# Patient Record
Sex: MALE | Race: BLACK OR AFRICAN AMERICAN | NOT HISPANIC OR LATINO | Employment: UNEMPLOYED | URBAN - NONMETROPOLITAN AREA
[De-identification: names, ages, dates, MRNs, and addresses within clinical notes are randomized per-mention and may not be internally consistent; named-entity substitution may affect disease eponyms.]

---

## 2018-03-29 ENCOUNTER — HISTORICAL (OUTPATIENT)
Dept: ADMINISTRATIVE | Facility: HOSPITAL | Age: 23
End: 2018-03-29

## 2018-05-07 ENCOUNTER — HISTORICAL (OUTPATIENT)
Dept: ADMINISTRATIVE | Facility: HOSPITAL | Age: 23
End: 2018-05-07

## 2019-01-14 ENCOUNTER — HISTORICAL (OUTPATIENT)
Dept: ADMINISTRATIVE | Facility: HOSPITAL | Age: 24
End: 2019-01-14

## 2019-12-03 ENCOUNTER — HISTORICAL (OUTPATIENT)
Dept: ADMINISTRATIVE | Facility: HOSPITAL | Age: 24
End: 2019-12-03

## 2020-08-15 ENCOUNTER — HISTORICAL (OUTPATIENT)
Dept: ADMINISTRATIVE | Facility: HOSPITAL | Age: 25
End: 2020-08-15

## 2021-12-26 ENCOUNTER — HISTORICAL (OUTPATIENT)
Dept: ADMINISTRATIVE | Facility: HOSPITAL | Age: 26
End: 2021-12-26

## 2022-03-04 ENCOUNTER — HISTORICAL (OUTPATIENT)
Dept: ADMINISTRATIVE | Facility: HOSPITAL | Age: 27
End: 2022-03-04

## 2023-01-29 ENCOUNTER — HOSPITAL ENCOUNTER (EMERGENCY)
Facility: HOSPITAL | Age: 28
Discharge: HOME | End: 2023-01-29
Payer: MEDICARE

## 2023-01-29 VITALS
DIASTOLIC BLOOD PRESSURE: 92 MMHG | WEIGHT: 302 LBS | TEMPERATURE: 97 F | HEIGHT: 73 IN | HEART RATE: 71 BPM | RESPIRATION RATE: 18 BRPM | OXYGEN SATURATION: 99 % | BODY MASS INDEX: 40.02 KG/M2 | SYSTOLIC BLOOD PRESSURE: 127 MMHG

## 2023-01-29 DIAGNOSIS — L60.0 INGROWN RIGHT GREATER TOENAIL: Primary | ICD-10-CM

## 2023-01-29 PROCEDURE — 99284 EMERGENCY DEPT VISIT MOD MDM: CPT

## 2023-01-29 RX ORDER — MUPIROCIN 20 MG/G
OINTMENT TOPICAL 3 TIMES DAILY
Qty: 25 G | Refills: 0 | Status: SHIPPED | OUTPATIENT
Start: 2023-01-29 | End: 2023-02-03

## 2023-01-29 RX ORDER — ACETAMINOPHEN AND CODEINE PHOSPHATE 300; 30 MG/1; MG/1
1 TABLET ORAL EVERY 8 HOURS PRN
Qty: 6 TABLET | Refills: 0 | Status: SHIPPED | OUTPATIENT
Start: 2023-01-29 | End: 2024-01-24

## 2023-01-29 RX ORDER — ACETAMINOPHEN AND CODEINE PHOSPHATE 300; 30 MG/1; MG/1
1 TABLET ORAL EVERY 8 HOURS PRN
Qty: 6 TABLET | Refills: 0 | Status: SHIPPED | OUTPATIENT
Start: 2023-01-29 | End: 2023-01-29 | Stop reason: SDUPTHER

## 2023-01-29 RX ORDER — MUPIROCIN 20 MG/G
OINTMENT TOPICAL 3 TIMES DAILY
Qty: 25 G | Refills: 0 | Status: SHIPPED | OUTPATIENT
Start: 2023-01-29 | End: 2023-01-29 | Stop reason: SDUPTHER

## 2023-01-29 NOTE — ED NOTES
Pt presents with pain to right great toe d/t ingrown toenail. States mother has been trying to remove ingrown toe nail but has not been successful. Redness and swelling noted to toe.

## 2023-01-29 NOTE — ED PROVIDER NOTES
Encounter Date: 1/29/2023       History     Chief Complaint   Patient presents with    Toe Pain     AMB TO ED WITH C/O PAIN TO R GREAT TOE DUE TO INGROWN NAIL     Rt great toe pain and redness x 3 days, the pt states he tried to remove an ingrown toenail2 days ago    Review of patient's allergies indicates:   Allergen Reactions    Codeine      Past Medical History:   Diagnosis Date    Diabetes mellitus      Past Surgical History:   Procedure Laterality Date    WRIST SURGERY       History reviewed. No pertinent family history.  Social History     Tobacco Use    Smoking status: Never    Smokeless tobacco: Never   Substance Use Topics    Alcohol use: Never    Drug use: Never     Review of Systems   Skin:         Rt great toenail redness and mild swelling   All other systems reviewed and are negative.    Physical Exam     Initial Vitals [01/29/23 1443]   BP Pulse Resp Temp SpO2   111/65 90 18 97.2 °F (36.2 °C) 98 %      MAP       --         Physical Exam    Nursing note and vitals reviewed.  Constitutional: He appears well-developed and well-nourished.   HENT:   Head: Normocephalic and atraumatic.   Mouth/Throat: Mucous membranes are normal.   Eyes: EOM are normal. Pupils are equal, round, and reactive to light.   Neck: Neck supple.   Normal range of motion.  Cardiovascular:  Normal rate, regular rhythm, normal heart sounds and intact distal pulses.           Pulmonary/Chest: Breath sounds normal.   Abdominal: Abdomen is soft. Bowel sounds are normal.   Musculoskeletal:         General: Normal range of motion.      Cervical back: Normal range of motion and neck supple.     Neurological: He is alert and oriented to person, place, and time. He has normal strength.   Skin: Skin is warm and dry. Capillary refill takes less than 2 seconds.   Rt great toenail lateral side erythema ,and mild swelling, no drainage   Psychiatric: He has a normal mood and affect. His behavior is normal. Judgment and thought content normal.        ED Course   Procedures  Labs Reviewed - No data to display       Imaging Results    None          Medications - No data to display                           Clinical Impression:   Final diagnoses:  [L60.0] Ingrown right greater toenail (Primary)        ED Disposition Condition    Discharge Stable          ED Prescriptions       Medication Sig Dispense Start Date End Date Auth. Provider    mupirocin (BACTROBAN) 2 % ointment  (Status: Discontinued) Apply topically 3 (three) times daily. for 5 days 25 g 1/29/2023 1/29/2023 STACEY Barrios    acetaminophen-codeine 300-30mg (TYLENOL #3) 300-30 mg Tab  (Status: Discontinued) Take 1 tablet by mouth every 8 (eight) hours as needed. 6 tablet 1/29/2023 1/29/2023 STACEY Barrios    acetaminophen-codeine 300-30mg (TYLENOL #3) 300-30 mg Tab Take 1 tablet by mouth every 8 (eight) hours as needed. 6 tablet 1/29/2023 -- STACEY Barrios    mupirocin (BACTROBAN) 2 % ointment Apply topically 3 (three) times daily. for 5 days 25 g 1/29/2023 2/3/2023 STACEY Barrios          Follow-up Information       Follow up With Specialties Details Why Contact Info    Vannesa Cooley NP Family Medicine Schedule an appointment as soon as possible for a visit   [address] [place] [id number] [phone]               STACEY Barrios  01/29/23 4313

## 2023-01-31 ENCOUNTER — NURSE TRIAGE (OUTPATIENT)
Dept: ADMINISTRATIVE | Facility: CLINIC | Age: 28
End: 2023-01-31
Payer: MEDICARE

## 2023-01-31 NOTE — TELEPHONE ENCOUNTER
[facility] incoming call - Pt c/o ingrown toenail,  no fever, not infected. Info only protocol followed and pt advised to soak in epsom salt solution daily then put Bactroban ointment as rx on and wrap in clean gauze to keep wound clean  and to control his pain with tylenol 3 rx and if he has any problems to call [facility]oc at 1 [phone]. Pt has no further questions at this time after education given on wound care and rx. Encounter routed to PCP/staff.   Reason for Disposition   Information only question and nurse able to answer    Protocols used: Information Only Call - No Triage-A-OH

## 2023-06-06 ENCOUNTER — LAB VISIT (OUTPATIENT)
Dept: LAB | Facility: HOSPITAL | Age: 28
End: 2023-06-06
Attending: NURSE PRACTITIONER
Payer: MEDICAID

## 2023-06-06 DIAGNOSIS — R79.89 OTHER SPECIFIED ABNORMAL FINDINGS OF BLOOD CHEMISTRY: ICD-10-CM

## 2023-06-06 DIAGNOSIS — R73.03 PREDIABETES: ICD-10-CM

## 2023-06-06 DIAGNOSIS — Z00.00 ROUTINE GENERAL MEDICAL EXAMINATION AT A HEALTH CARE FACILITY: Primary | ICD-10-CM

## 2023-06-06 LAB
ALBUMIN SERPL-MCNC: 4.7 G/DL (ref 3.4–5)
ALBUMIN/GLOB SERPL: 1.7 RATIO
ALP SERPL-CCNC: 65 UNIT/L (ref 50–144)
ALT SERPL-CCNC: 39 UNIT/L (ref 1–45)
ANION GAP SERPL CALC-SCNC: 9 MEQ/L (ref 2–13)
AST SERPL-CCNC: 36 UNIT/L (ref 17–59)
BASOPHILS # BLD AUTO: 0.03 X10(3)/MCL (ref 0.01–0.08)
BASOPHILS NFR BLD AUTO: 0.3 % (ref 0.1–1.2)
BILIRUBIN DIRECT+TOT PNL SERPL-MCNC: 0.9 MG/DL (ref 0–1)
BUN SERPL-MCNC: 10 MG/DL (ref 7–20)
CALCIUM SERPL-MCNC: 9.5 MG/DL (ref 8.4–10.2)
CHLORIDE SERPL-SCNC: 100 MMOL/L (ref 98–110)
CHOLEST SERPL-MCNC: 83 MG/DL (ref 0–200)
CO2 SERPL-SCNC: 28 MMOL/L (ref 21–32)
CREAT SERPL-MCNC: 0.54 MG/DL (ref 0.66–1.25)
CREAT UR-MCNC: 23.3 MG/DL (ref 63–166)
CREAT/UREA NIT SERPL: 19 (ref 12–20)
EOSINOPHIL # BLD AUTO: 0.05 X10(3)/MCL (ref 0.04–0.54)
EOSINOPHIL NFR BLD AUTO: 0.4 % (ref 0.7–7)
ERYTHROCYTE [DISTWIDTH] IN BLOOD BY AUTOMATED COUNT: 13.3 %
EST. AVERAGE GLUCOSE BLD GHB EST-MCNC: 231.7 MG/DL (ref 70–115)
GFR SERPLBLD CREATININE-BSD FMLA CKD-EPI: >90 MLS/MIN/1.73/M2
GLOBULIN SER-MCNC: 2.8 GM/DL (ref 2–3.9)
GLUCOSE SERPL-MCNC: 54 MG/DL (ref 70–115)
HBA1C MFR BLD: 9.7 % (ref 4–6)
HCT VFR BLD AUTO: 42.5 % (ref 36–52)
HDLC SERPL-MCNC: 27 MG/DL (ref 40–60)
HGB BLD-MCNC: 14.5 G/DL (ref 13–18)
IMM GRANULOCYTES # BLD AUTO: 0.03 X10(3)/MCL (ref 0–0.03)
IMM GRANULOCYTES NFR BLD AUTO: 0.3 % (ref 0–0.5)
LDLC SERPL DIRECT ASSAY-SCNC: 35 MG/DL (ref 30–100)
LYMPHOCYTES # BLD AUTO: 3.31 X10(3)/MCL (ref 1.32–3.57)
LYMPHOCYTES NFR BLD AUTO: 28 % (ref 20–55)
MCH RBC QN AUTO: 29.4 PG (ref 27–34)
MCHC RBC AUTO-ENTMCNC: 34.1 G/DL (ref 31–37)
MCV RBC AUTO: 86 FL (ref 79–99)
MICROALBUMIN UR-MCNC: <5 UG/ML
MICROALBUMIN/CREAT RATIO PNL UR: <21.5 MG/GM CR (ref 0–30)
MONOCYTES # BLD AUTO: 0.92 X10(3)/MCL (ref 0.3–0.82)
MONOCYTES NFR BLD AUTO: 7.8 % (ref 4.7–12.5)
NEUTROPHILS # BLD AUTO: 7.5 X10(3)/MCL (ref 1.78–5.38)
NEUTROPHILS NFR BLD AUTO: 63.2 % (ref 37–73)
NRBC BLD AUTO-RTO: 0 %
PLATELET # BLD AUTO: 301 X10(3)/MCL (ref 140–371)
PMV BLD AUTO: 8.4 FL (ref 9.4–12.4)
POTASSIUM SERPL-SCNC: 3.7 MMOL/L (ref 3.5–5.1)
PROT SERPL-MCNC: 7.5 GM/DL (ref 6.3–8.2)
RBC # BLD AUTO: 4.94 X10(6)/MCL (ref 4–6)
SODIUM SERPL-SCNC: 137 MMOL/L (ref 135–145)
TRIGL SERPL-MCNC: 171 MG/DL (ref 30–200)
TSH SERPL-ACNC: 0.77 UIU/ML (ref 0.36–3.74)
WBC # SPEC AUTO: 11.84 X10(3)/MCL (ref 4–11.5)

## 2023-06-06 PROCEDURE — 83036 HEMOGLOBIN GLYCOSYLATED A1C: CPT

## 2023-06-06 PROCEDURE — 85025 COMPLETE CBC W/AUTO DIFF WBC: CPT

## 2023-06-06 PROCEDURE — 82043 UR ALBUMIN QUANTITATIVE: CPT

## 2023-06-06 PROCEDURE — 84443 ASSAY THYROID STIM HORMONE: CPT

## 2023-06-06 PROCEDURE — 36415 COLL VENOUS BLD VENIPUNCTURE: CPT

## 2023-06-06 PROCEDURE — 80053 COMPREHEN METABOLIC PANEL: CPT

## 2023-06-06 PROCEDURE — 80061 LIPID PANEL: CPT

## 2023-10-21 ENCOUNTER — LAB VISIT (OUTPATIENT)
Dept: LAB | Facility: HOSPITAL | Age: 28
End: 2023-10-21
Attending: NURSE PRACTITIONER
Payer: MEDICARE

## 2023-10-21 DIAGNOSIS — Z00.00 UNSPECIFIED GENERAL MEDICAL EXAMINATION: ICD-10-CM

## 2023-10-21 DIAGNOSIS — I10 ESSENTIAL HYPERTENSION, MALIGNANT: ICD-10-CM

## 2023-10-21 DIAGNOSIS — Z00.00 ROUTINE GENERAL MEDICAL EXAMINATION AT A HEALTH CARE FACILITY: Primary | ICD-10-CM

## 2023-10-21 DIAGNOSIS — E83.10 DISORDER OF IRON METABOLISM, UNSPECIFIED: ICD-10-CM

## 2023-10-21 LAB
ALBUMIN SERPL-MCNC: 4.6 G/DL (ref 3.4–5)
ALBUMIN/GLOB SERPL: 1.5 RATIO
ALP SERPL-CCNC: 59 UNIT/L (ref 50–144)
ALT SERPL-CCNC: 36 UNIT/L (ref 1–45)
ANION GAP SERPL CALC-SCNC: 9 MEQ/L (ref 2–13)
AST SERPL-CCNC: 36 UNIT/L (ref 17–59)
BILIRUB SERPL-MCNC: 0.8 MG/DL (ref 0–1)
BUN SERPL-MCNC: 12 MG/DL (ref 7–20)
CALCIUM SERPL-MCNC: 9.9 MG/DL (ref 8.4–10.2)
CHLORIDE SERPL-SCNC: 99 MMOL/L (ref 98–110)
CHOLEST SERPL-MCNC: 81 MG/DL (ref 0–200)
CO2 SERPL-SCNC: 28 MMOL/L (ref 21–32)
CREAT SERPL-MCNC: 0.61 MG/DL (ref 0.66–1.25)
CREAT/UREA NIT SERPL: 20 (ref 12–20)
EST. AVERAGE GLUCOSE BLD GHB EST-MCNC: 145.6 MG/DL (ref 70–115)
GFR SERPLBLD CREATININE-BSD FMLA CKD-EPI: >90 MLS/MIN/1.73/M2
GLOBULIN SER-MCNC: 3.1 GM/DL (ref 2–3.9)
GLUCOSE SERPL-MCNC: 43 MG/DL (ref 70–115)
HBA1C MFR BLD: 6.7 % (ref 4–6)
HDLC SERPL-MCNC: 32 MG/DL (ref 40–60)
LDLC SERPL DIRECT ASSAY-SCNC: 32.5 MG/DL (ref 30–100)
MICROALBUMIN UR-MCNC: 20 UG/ML
POTASSIUM SERPL-SCNC: 4.3 MMOL/L (ref 3.5–5.1)
PROT SERPL-MCNC: 7.7 GM/DL (ref 6.3–8.2)
SODIUM SERPL-SCNC: 136 MMOL/L (ref 135–145)
TRIGL SERPL-MCNC: 64 MG/DL (ref 30–200)

## 2023-10-21 PROCEDURE — 83036 HEMOGLOBIN GLYCOSYLATED A1C: CPT

## 2023-10-21 PROCEDURE — 36415 COLL VENOUS BLD VENIPUNCTURE: CPT

## 2023-10-21 PROCEDURE — 80061 LIPID PANEL: CPT

## 2023-10-21 PROCEDURE — 80053 COMPREHEN METABOLIC PANEL: CPT

## 2023-10-21 PROCEDURE — 82043 UR ALBUMIN QUANTITATIVE: CPT

## 2023-12-19 ENCOUNTER — HOSPITAL ENCOUNTER (EMERGENCY)
Facility: HOSPITAL | Age: 28
Discharge: HOME | End: 2023-12-19
Payer: MEDICARE

## 2023-12-19 VITALS
SYSTOLIC BLOOD PRESSURE: 118 MMHG | OXYGEN SATURATION: 100 % | RESPIRATION RATE: 20 BRPM | BODY MASS INDEX: 38.43 KG/M2 | TEMPERATURE: 98 F | WEIGHT: 290 LBS | HEART RATE: 112 BPM | DIASTOLIC BLOOD PRESSURE: 88 MMHG | HEIGHT: 73 IN

## 2023-12-19 DIAGNOSIS — B34.9 VIRAL SYNDROME: Primary | ICD-10-CM

## 2023-12-19 LAB
INFLUENZA A (OHS): NEGATIVE
INFLUENZA B (OHS): NEGATIVE
RAPID GROUP A STREP (OHS): NEGATIVE

## 2023-12-19 PROCEDURE — 87651 STREP A DNA AMP PROBE: CPT | Performed by: NURSE PRACTITIONER

## 2023-12-19 PROCEDURE — 87400 INFLUENZA A/B EACH AG IA: CPT | Performed by: NURSE PRACTITIONER

## 2023-12-19 PROCEDURE — 99283 EMERGENCY DEPT VISIT LOW MDM: CPT

## 2023-12-20 NOTE — ED PROVIDER NOTES
Encounter Date: 12/19/2023       History     Chief Complaint   Patient presents with    Influenza     Onset yesterday pt cough cold congestion nasal congestion headache throat pain. Pt was exposed to flu       Onset yesterday pt cough cold congestion nasal congestion headache throat pain. Pt was exposed to flu          Review of patient's allergies indicates:   Allergen Reactions    Codeine      Past Medical History:   Diagnosis Date    Diabetes mellitus      Past Surgical History:   Procedure Laterality Date    WRIST SURGERY       History reviewed. No pertinent family history.  Social History     Tobacco Use    Smoking status: Never    Smokeless tobacco: Never   Substance Use Topics    Alcohol use: Never    Drug use: Never     Review of Systems   HENT:  Positive for congestion and sore throat.    Respiratory:  Positive for cough.    Neurological:  Positive for headaches.   All other systems reviewed and are negative.      Physical Exam     Initial Vitals [12/19/23 2013]   BP Pulse Resp Temp SpO2   118/88 (!) 112 20 98.2 °F (36.8 °C) 100 %      MAP       --         Physical Exam    Nursing note and vitals reviewed.  Constitutional: He appears well-developed and well-nourished.   HENT:   Head: Normocephalic and atraumatic.   Mouth/Throat: Mucous membranes are normal.   Eyes: Pupils are equal, round, and reactive to light.   Neck:   Normal range of motion.  Cardiovascular:  Normal rate, regular rhythm and normal heart sounds.           Pulmonary/Chest: Breath sounds normal.   Musculoskeletal:         General: Normal range of motion.      Cervical back: Normal range of motion.     Neurological: He is alert and oriented to person, place, and time.   Skin: Skin is warm and dry. Capillary refill takes less than 2 seconds.   Psychiatric: He has a normal mood and affect. His behavior is normal. Judgment and thought content normal.         ED Course   Procedures  Labs Reviewed   RAPID INFLUENZA A/B - Normal   THROAT SCREEN,  RAPID STREP - Normal          Imaging Results    None          Medications - No data to display  Medical Decision Making  Problems Addressed:  Viral syndrome: self-limited or minor problem                                      Clinical Impression:  Final diagnoses:  [B34.9] Viral syndrome (Primary)          ED Disposition Condition    Discharge Stable          ED Prescriptions    None       Follow-up Information       Follow up With Specialties Details Why Contact Info    Vannesa Cooley NP Family Medicine Call  As needed [address] [facility] [id number] [phone]               Christianne Gaspar, FNP  12/19/23 6138

## 2024-01-18 ENCOUNTER — HOSPITAL ENCOUNTER (EMERGENCY)
Facility: HOSPITAL | Age: 29
Discharge: HOME | End: 2024-01-18
Payer: MEDICARE

## 2024-01-18 VITALS
SYSTOLIC BLOOD PRESSURE: 140 MMHG | BODY MASS INDEX: 39.33 KG/M2 | HEIGHT: 72 IN | TEMPERATURE: 98 F | RESPIRATION RATE: 20 BRPM | DIASTOLIC BLOOD PRESSURE: 79 MMHG | HEART RATE: 105 BPM | OXYGEN SATURATION: 100 %

## 2024-01-18 DIAGNOSIS — U07.1 SARS-COV-2 POSITIVE: Primary | ICD-10-CM

## 2024-01-18 LAB
INFLUENZA A (OHS): NEGATIVE
INFLUENZA B (OHS): NEGATIVE
SARS-COV-2 RDRP RESP QL NAA+PROBE: POSITIVE

## 2024-01-18 PROCEDURE — 99283 EMERGENCY DEPT VISIT LOW MDM: CPT

## 2024-01-18 PROCEDURE — 87635 SARS-COV-2 COVID-19 AMP PRB: CPT | Performed by: NURSE PRACTITIONER

## 2024-01-18 PROCEDURE — 87400 INFLUENZA A/B EACH AG IA: CPT | Performed by: NURSE PRACTITIONER

## 2024-01-18 PROCEDURE — 25000003 PHARM REV CODE 250: Performed by: NURSE PRACTITIONER

## 2024-01-18 RX ORDER — GUAIFENESIN 100 MG/5ML
200 SOLUTION ORAL 3 TIMES DAILY PRN
Qty: 180 ML | Refills: 0 | Status: SHIPPED | OUTPATIENT
Start: 2024-01-18 | End: 2024-01-24

## 2024-01-18 RX ORDER — GUAIFENESIN 100 MG/5ML
200 SOLUTION ORAL EVERY 4 HOURS PRN
Status: DISCONTINUED | OUTPATIENT
Start: 2024-01-18 | End: 2024-01-18 | Stop reason: HOSPADM

## 2024-01-18 RX ORDER — IBUPROFEN 400 MG/1
800 TABLET ORAL
Status: COMPLETED | OUTPATIENT
Start: 2024-01-18 | End: 2024-01-18

## 2024-01-18 RX ADMIN — IBUPROFEN 800 MG: 400 TABLET, FILM COATED ORAL at 08:01

## 2024-01-18 RX ADMIN — GUAIFENESIN 200 MG: 200 SOLUTION ORAL at 08:01

## 2024-01-18 NOTE — Clinical Note
"Michael Pryorhoracio Malone was seen and treated in our emergency department on 1/18/2024.  He may return to work on 01/22/2024.       If you have any questions or concerns, please don't hesitate to call.      Christianne Gaspar, KARLAP"

## 2024-01-19 DIAGNOSIS — U07.1 COVID-19 VIRUS DETECTED: ICD-10-CM

## 2024-01-19 NOTE — ED PROVIDER NOTES
Encounter Date: 1/18/2024       History     Chief Complaint   Patient presents with    Headache     Pt reports onset of headache, generalized body aches, and cough this AM. Pt denies any N/V/D.     Generalized Body Aches    Cough       Pt reports onset of headache, generalized body aches, and cough this AM. Pt denies any N/V/D          Review of patient's allergies indicates:   Allergen Reactions    Codeine      Past Medical History:   Diagnosis Date    Diabetes mellitus      Past Surgical History:   Procedure Laterality Date    WRIST SURGERY       History reviewed. No pertinent family history.  Social History     Tobacco Use    Smoking status: Never    Smokeless tobacco: Never   Substance Use Topics    Alcohol use: Never    Drug use: Never     Review of Systems   Respiratory:  Positive for cough.    Musculoskeletal:  Positive for myalgias.   Neurological:  Positive for headaches.   All other systems reviewed and are negative.      Physical Exam     Initial Vitals [01/18/24 2021]   BP Pulse Resp Temp SpO2   (!) 140/79 105 20 98.1 °F (36.7 °C) 100 %      MAP       --         Physical Exam    Nursing note and vitals reviewed.  Constitutional: He appears well-developed and well-nourished.   HENT:   Head: Normocephalic and atraumatic.   Mouth/Throat: Mucous membranes are normal.   Eyes: Pupils are equal, round, and reactive to light.   Neck: Neck supple.   Normal range of motion.  Cardiovascular:  Normal rate, regular rhythm and normal heart sounds.           Pulmonary/Chest: Breath sounds normal.   Musculoskeletal:         General: Normal range of motion.      Cervical back: Normal range of motion and neck supple.     Neurological: He is alert and oriented to person, place, and time.   Skin: Skin is warm and dry. Capillary refill takes less than 2 seconds.   Psychiatric: He has a normal mood and affect. His behavior is normal. Judgment and thought content normal.         ED Course   Procedures  Labs Reviewed    SARS-COV-2 RNA AMPLIFICATION, QUAL - Abnormal; Notable for the following components:       Result Value    SARS COV-2 MOLECULAR Positive (*)     All other components within normal limits   RAPID INFLUENZA A/B - Normal          Imaging Results    None          Medications   guaiFENesin 100 mg/5 ml syrup 200 mg (200 mg Oral Given 1/18/24 2055)   ibuprofen tablet 800 mg (800 mg Oral Given 1/18/24 2055)     Medical Decision Making  Problems Addressed:  SARS-CoV-2 positive: acute illness or injury    Risk  OTC drugs.  Prescription drug management.                                      Clinical Impression:  Final diagnoses:  [U07.1] SARS-CoV-2 positive (Primary)          ED Disposition Condition    Discharge Stable          ED Prescriptions       Medication Sig Dispense Start Date End Date Auth. Provider    guaiFENesin 100 mg/5 ml (ROBITUSSIN) 100 mg/5 mL syrup Take 10 mLs (200 mg total) by mouth 3 (three) times daily as needed for Cough. 180 mL 1/18/2024 1/28/2024 Christianne Gaspar FNP          Follow-up Information       Follow up With Specialties Details Why Contact Info    Vannesa Cooley NP Family Medicine Call  As needed [address] [facility] [address] [phone]               Christianne Gaspar FNP  01/18/24 2111

## 2024-01-24 ENCOUNTER — OFFICE VISIT (OUTPATIENT)
Dept: FAMILY MEDICINE | Facility: CLINIC | Age: 29
End: 2024-01-24
Payer: MEDICARE

## 2024-01-24 VITALS
HEART RATE: 100 BPM | TEMPERATURE: 99 F | OXYGEN SATURATION: 98 % | HEIGHT: 72 IN | SYSTOLIC BLOOD PRESSURE: 110 MMHG | WEIGHT: 274 LBS | BODY MASS INDEX: 37.11 KG/M2 | DIASTOLIC BLOOD PRESSURE: 78 MMHG

## 2024-01-24 DIAGNOSIS — E78.5 HYPERLIPIDEMIA, UNSPECIFIED HYPERLIPIDEMIA TYPE: ICD-10-CM

## 2024-01-24 DIAGNOSIS — J45.20 MILD INTERMITTENT ASTHMA WITHOUT COMPLICATION: ICD-10-CM

## 2024-01-24 DIAGNOSIS — F84.0 AUTISM: ICD-10-CM

## 2024-01-24 DIAGNOSIS — Z79.4 TYPE 2 DIABETES MELLITUS WITHOUT COMPLICATION, WITH LONG-TERM CURRENT USE OF INSULIN: ICD-10-CM

## 2024-01-24 DIAGNOSIS — F33.0 MILD EPISODE OF RECURRENT MAJOR DEPRESSIVE DISORDER: ICD-10-CM

## 2024-01-24 DIAGNOSIS — Z79.899 OTHER LONG TERM (CURRENT) DRUG THERAPY: ICD-10-CM

## 2024-01-24 DIAGNOSIS — J30.2 SEASONAL ALLERGIES: ICD-10-CM

## 2024-01-24 DIAGNOSIS — Z76.89 ENCOUNTER TO ESTABLISH CARE: Primary | ICD-10-CM

## 2024-01-24 DIAGNOSIS — E11.9 TYPE 2 DIABETES MELLITUS WITHOUT COMPLICATION, WITH LONG-TERM CURRENT USE OF INSULIN: ICD-10-CM

## 2024-01-24 DIAGNOSIS — F41.9 ANXIETY: ICD-10-CM

## 2024-01-24 PROBLEM — F32.0 CURRENT MILD EPISODE OF MAJOR DEPRESSIVE DISORDER: Status: ACTIVE | Noted: 2024-01-24

## 2024-01-24 PROCEDURE — 3008F BODY MASS INDEX DOCD: CPT | Mod: ,,, | Performed by: NURSE PRACTITIONER

## 2024-01-24 PROCEDURE — 3078F DIAST BP <80 MM HG: CPT | Mod: ,,, | Performed by: NURSE PRACTITIONER

## 2024-01-24 PROCEDURE — 4010F ACE/ARB THERAPY RXD/TAKEN: CPT | Mod: ,,, | Performed by: NURSE PRACTITIONER

## 2024-01-24 PROCEDURE — 3074F SYST BP LT 130 MM HG: CPT | Mod: ,,, | Performed by: NURSE PRACTITIONER

## 2024-01-24 PROCEDURE — 1160F RVW MEDS BY RX/DR IN RCRD: CPT | Mod: ,,, | Performed by: NURSE PRACTITIONER

## 2024-01-24 PROCEDURE — 99203 OFFICE O/P NEW LOW 30 MIN: CPT | Mod: ,,, | Performed by: NURSE PRACTITIONER

## 2024-01-24 PROCEDURE — 1159F MED LIST DOCD IN RCRD: CPT | Mod: ,,, | Performed by: NURSE PRACTITIONER

## 2024-01-24 RX ORDER — DULAGLUTIDE 4.5 MG/.5ML
4.5 INJECTION, SOLUTION SUBCUTANEOUS
COMMUNITY
Start: 2023-11-09

## 2024-01-24 RX ORDER — ARIPIPRAZOLE 5 MG/1
5 TABLET ORAL DAILY
COMMUNITY
Start: 2023-12-29

## 2024-01-24 RX ORDER — PIOGLITAZONEHYDROCHLORIDE 30 MG/1
30 TABLET ORAL DAILY
COMMUNITY
Start: 2023-11-09

## 2024-01-24 RX ORDER — TRAZODONE HYDROCHLORIDE 150 MG/1
150-300 TABLET ORAL NIGHTLY PRN
COMMUNITY
Start: 2023-12-29

## 2024-01-24 RX ORDER — MONTELUKAST SODIUM 5 MG/1
5 TABLET, CHEWABLE ORAL DAILY
COMMUNITY
End: 2024-04-29 | Stop reason: SDUPTHER

## 2024-01-24 RX ORDER — SAXAGLIPTIN 5 MG/1
5 TABLET, FILM COATED ORAL DAILY
COMMUNITY
Start: 2023-11-09

## 2024-01-24 RX ORDER — INSULIN GLARGINE 300 U/ML
60 INJECTION, SOLUTION SUBCUTANEOUS DAILY
COMMUNITY
Start: 2023-11-09

## 2024-01-24 RX ORDER — CITALOPRAM 10 MG/1
10 TABLET ORAL DAILY
COMMUNITY
Start: 2023-12-29 | End: 2024-03-12 | Stop reason: SDUPTHER

## 2024-01-24 RX ORDER — LISINOPRIL 20 MG/1
20 TABLET ORAL DAILY
COMMUNITY
Start: 2023-11-09

## 2024-01-24 RX ORDER — METFORMIN HYDROCHLORIDE 1000 MG/1
1000 TABLET ORAL
COMMUNITY
Start: 2023-11-09

## 2024-01-24 RX ORDER — ICOSAPENT ETHYL 1000 MG/1
2 CAPSULE ORAL NIGHTLY
COMMUNITY
Start: 2023-09-14 | End: 2024-03-25 | Stop reason: SDUPTHER

## 2024-01-24 RX ORDER — CETIRIZINE HYDROCHLORIDE 10 MG/1
10 TABLET ORAL DAILY
COMMUNITY
End: 2024-06-18

## 2024-01-24 RX ORDER — DAPAGLIFLOZIN 10 MG/1
10 TABLET, FILM COATED ORAL DAILY
COMMUNITY
Start: 2023-11-09 | End: 2024-04-29 | Stop reason: SDUPTHER

## 2024-01-24 RX ORDER — DONEPEZIL HYDROCHLORIDE 10 MG/1
10 TABLET, FILM COATED ORAL DAILY
COMMUNITY

## 2024-01-24 RX ORDER — CLONAZEPAM 1 MG/1
1 TABLET ORAL DAILY
COMMUNITY
Start: 2023-12-29

## 2024-01-24 RX ORDER — ATORVASTATIN CALCIUM 10 MG/1
10 TABLET, FILM COATED ORAL DAILY
COMMUNITY
Start: 2023-11-09

## 2024-01-24 NOTE — PROGRESS NOTES
Patient ID: Michael Malone  : 1995    Chief Complaint: Diabetes    Allergies: Patient is allergic to codeine.     History of Present Illness:  The patient is a 28 y.o. Black or  male who presents to clinic for follow up on Diabetes.  Accompanied by mother.  He was diagnosed with type 2 diabetes at age 14.  Currently followed by Endocrinology (Darrell Witt).  Most recent hemoglobin A1c was 6.5 so Toujeo was decreased to 60 units daily.  He reports that majority of fasting blood sugars or less than or equal to 100.  Up-to-date with diabetic eye exam at the eye Clinic with appointment scheduled in May 2024.      Mother also reports that he sees Psychiatry on a routine basis for anxiety, depression, insomnia, and autism.  He has been stable on current medication regimen for several years.    He also has intermittent asthma and seasonal allergies but has not required medications since October.    Mother is requesting labs prior to next visit.    Social History:  reports that he has never smoked. He has never used smokeless tobacco. He reports that he does not drink alcohol and does not use drugs.    Past Medical History:  has a past medical history of Anxiety, Autism, Diabetes mellitus, Diabetes mellitus, type 2, and Hyperlipidemia.    Current Medications:  Current Outpatient Medications   Medication Instructions    ARIPiprazole (ABILIFY) 5 mg, Oral, Daily    atorvastatin (LIPITOR) 10 mg, Oral, Daily    cetirizine (ZYRTEC) 10 mg, Oral, Daily    citalopram (CELEXA) 10 mg, Oral, Daily    clonazePAM (KLONOPIN) 1 mg, Oral, Daily    donepeziL (ARICEPT) 10 mg, Oral, Daily    FARXIGA 10 mg, Oral, Daily    lisinopriL (PRINIVIL,ZESTRIL) 20 mg, Oral, Daily    metFORMIN (GLUCOPHAGE) 1,000 mg, Oral, With breakfast    montelukast (SINGULAIR) 5 mg, Oral, Daily    pioglitazone (ACTOS) 30 mg, Oral, Daily    SAXagliptin (ONGLYZA) 5 mg, Oral, Daily    TOUJEO SOLOSTAR U-300 INSULIN 60 Units, Subcutaneous,  Daily    traZODone (DESYREL) 150-300 mg, Oral, Nightly PRN    TRULICITY 4.5 mg, Subcutaneous, Every 7 days    VASCEPA 1 gram Cap 2 capsules, Oral, Nightly       Review of Systems   Constitutional:  Negative for activity change, appetite change, fatigue and unexpected weight change.   HENT:  Negative for ear pain and hearing loss.    Eyes:  Negative for redness, visual disturbance and eye dryness.   Respiratory:  Negative for apnea, cough, chest tightness, shortness of breath and wheezing.    Cardiovascular:  Negative for chest pain, palpitations, leg swelling and claudication.   Gastrointestinal:  Negative for abdominal pain, anal bleeding, blood in stool, change in bowel habit, nausea, vomiting and reflux.   Endocrine: Negative for cold intolerance, heat intolerance, polydipsia, polyphagia and polyuria.   Genitourinary:  Negative for dysuria, frequency, hematuria and urgency.   Musculoskeletal:  Negative for arthralgias, back pain and leg pain.   Integumentary:  Negative for rash, wound and mole/lesion.   Neurological:  Negative for dizziness, headaches and memory loss.   Hematological:  Negative for adenopathy. Does not bruise/bleed easily.   Psychiatric/Behavioral:  Negative for agitation, self-injury and sleep disturbance. The patient is not nervous/anxious.         Visit Vitals  /78 (BP Location: Left arm)   Pulse 100   Temp 98.6 °F (37 °C) (Temporal)   Ht 6' (1.829 m)   Wt 124.3 kg (274 lb)   SpO2 98%   BMI 37.16 kg/m²       Physical Exam  Vitals reviewed.   Constitutional:       General: He is not in acute distress.     Appearance: Normal appearance. He is obese.   HENT:      Right Ear: Tympanic membrane, ear canal and external ear normal.      Left Ear: Tympanic membrane, ear canal and external ear normal.      Nose: Nose normal.      Mouth/Throat:      Mouth: Mucous membranes are moist.   Eyes:      Extraocular Movements: Extraocular movements intact.      Conjunctiva/sclera: Conjunctivae normal.       Pupils: Pupils are equal, round, and reactive to light.   Cardiovascular:      Rate and Rhythm: Normal rate and regular rhythm.      Pulses: Normal pulses.      Heart sounds: Normal heart sounds.   Pulmonary:      Effort: Pulmonary effort is normal.      Breath sounds: Normal breath sounds.   Abdominal:      General: Bowel sounds are normal.      Palpations: Abdomen is soft.      Tenderness: There is no abdominal tenderness.   Musculoskeletal:         General: Normal range of motion.      Cervical back: Normal range of motion and neck supple.   Skin:     General: Skin is warm and dry.   Neurological:      Mental Status: He is alert and oriented to person, place, and time. Mental status is at baseline.   Psychiatric:         Mood and Affect: Mood normal.         Thought Content: Thought content normal.         Judgment: Judgment normal.          Labs Reviewed:  Chemistry:  Lab Results   Component Value Date     10/21/2023    K 4.3 10/21/2023    CHLORIDE 99 10/21/2023    BUN 12.0 10/21/2023    CREATININE 0.61 (L) 10/21/2023    EGFRNORACEVR >90 10/21/2023    GLUCOSE 43 (L) 10/21/2023    CALCIUM 9.9 10/21/2023    ALKPHOS 59 10/21/2023    LABPROT 7.7 10/21/2023    ALBUMIN 4.6 10/21/2023    AST 36 10/21/2023    ALT 36 10/21/2023    TSH 0.773 06/06/2023        Lab Results   Component Value Date    HGBA1C 6.7 (H) 10/21/2023        Hematology:  Lab Results   Component Value Date    WBC 11.84 (H) 06/06/2023    RBC 4.94 06/06/2023    HGB 14.5 06/06/2023    HCT 42.5 06/06/2023    MCV 86.0 06/06/2023    MCH 29.4 06/06/2023    MCHC 34.1 06/06/2023    RDW 13.3 06/06/2023     06/06/2023    MPV 8.4 (L) 06/06/2023       Lipid Panel:  Lab Results   Component Value Date    CHOL 81 10/21/2023    HDL 32 (L) 10/21/2023    DLDL 32.5 10/21/2023    TRIG 64 10/21/2023        Assessment & Plan:  1. Encounter to establish care    2. Type 2 diabetes mellitus without complication, with long-term current use of  insulin  Overview:  Diagnosed at age 14  Followed by endocrinology (Darrell Witt)  On Trulicity, Toujeo, pioglitazone, metformin, Farxiga, Onglyza    Orders:  -     CBC Auto Differential; Future; Expected date: 01/24/2024  -     Comprehensive Metabolic Panel; Future; Expected date: 01/24/2024  -     Hemoglobin A1C; Future; Expected date: 01/24/2024    3. Autism    4. Anxiety  Overview:  On clonazepam    Orders:  -     TSH; Future; Expected date: 01/24/2024  -     Vitamin B12; Future; Expected date: 01/24/2024  -     Vitamin D; Future; Expected date: 07/24/2024    5. Mild episode of recurrent major depressive disorder  Overview:  On aripiprazole, citalopram      6. Seasonal allergies  Overview:  On cetirizine      7. Mild intermittent asthma without complication  Overview:  On montelukast      8. Hyperlipidemia, unspecified hyperlipidemia type  Overview:  On Vascepa and atorvastatin    Orders:  -     Lipid Panel; Future; Expected date: 01/24/2024    9. Other long term (current) drug therapy  -     Vitamin B12; Future; Expected date: 01/24/2024    10. Body mass index (BMI) 37.0-37.9, adult  -     Vitamin D; Future; Expected date: 07/24/2024    Continue current medications   Fasting labs including CBC, CMP, FLP, A1c, TSH, vitamin-D, vitamin B12 prior to next visit  Obtain records from endocrinology    Future Appointments   Date Time Provider Department Center   1/26/2024 10:00 AM [facility]   [facility]21[facility]2024 10[facility]00 [facility]       Follow up in about 4 weeks (around 2/21/2024) for lab review. Call sooner if needed.    STACEY Duran

## 2024-02-20 ENCOUNTER — TELEPHONE (OUTPATIENT)
Dept: FAMILY MEDICINE | Facility: CLINIC | Age: 29
End: 2024-02-20
Payer: MEDICARE

## 2024-02-27 ENCOUNTER — HOSPITAL ENCOUNTER (EMERGENCY)
Facility: HOSPITAL | Age: 29
Discharge: HOME | End: 2024-02-27
Payer: MEDICARE

## 2024-02-27 VITALS
WEIGHT: 270 LBS | RESPIRATION RATE: 20 BRPM | TEMPERATURE: 99 F | BODY MASS INDEX: 35.78 KG/M2 | SYSTOLIC BLOOD PRESSURE: 148 MMHG | OXYGEN SATURATION: 98 % | DIASTOLIC BLOOD PRESSURE: 91 MMHG | HEIGHT: 73 IN | HEART RATE: 111 BPM

## 2024-02-27 DIAGNOSIS — B34.9 VIRAL ILLNESS: Primary | ICD-10-CM

## 2024-02-27 LAB
INFLUENZA A (OHS): NEGATIVE
INFLUENZA B (OHS): NEGATIVE
RAPID GROUP A STREP (OHS): NEGATIVE
SARS-COV-2 RDRP RESP QL NAA+PROBE: NEGATIVE

## 2024-02-27 PROCEDURE — 87400 INFLUENZA A/B EACH AG IA: CPT | Performed by: NURSE PRACTITIONER

## 2024-02-27 PROCEDURE — 87635 SARS-COV-2 COVID-19 AMP PRB: CPT | Performed by: NURSE PRACTITIONER

## 2024-02-27 PROCEDURE — 87651 STREP A DNA AMP PROBE: CPT | Performed by: NURSE PRACTITIONER

## 2024-02-27 PROCEDURE — 99282 EMERGENCY DEPT VISIT SF MDM: CPT

## 2024-02-27 NOTE — Clinical Note
"Michael Pryorhoracio Malone was seen and treated in our emergency department on 2/27/2024.  He may return to work on 02/28/2024.       If you have any questions or concerns, please don't hesitate to call.      Christianne Gaspar, KARLAP"

## 2024-02-28 ENCOUNTER — TELEPHONE (OUTPATIENT)
Dept: FAMILY MEDICINE | Facility: CLINIC | Age: 29
End: 2024-02-28
Payer: MEDICARE

## 2024-02-28 NOTE — ED PROVIDER NOTES
Encounter Date: 2/27/2024       History     Chief Complaint   Patient presents with    Cough     Cough, sneezing sore throat - onset yesterday     Cough, sneezing sore throat - onset yesterday      Review of patient's allergies indicates:   Allergen Reactions    Codeine      Past Medical History:   Diagnosis Date    Anxiety     Autism     Diabetes mellitus     Diabetes mellitus, type 2     Hyperlipidemia      Past Surgical History:   Procedure Laterality Date    ADENOIDECTOMY      TONSILLECTOMY      WRIST SURGERY       Family History   Problem Relation Age of Onset    Hypertension Mother     Hyperlipidemia Mother     Breast cancer Mother     Anxiety disorder Mother     Depression Mother     Hypertension Father     Kidney failure Father     Heart failure Father     Diabetes type II Father     Rheum arthritis Father     Hyperlipidemia Father      Social History     Tobacco Use    Smoking status: Never    Smokeless tobacco: Never   Substance Use Topics    Alcohol use: Not Currently    Drug use: Never     Review of Systems   HENT:  Positive for congestion and sore throat.    Respiratory:  Positive for cough.    All other systems reviewed and are negative.      Physical Exam     Initial Vitals [02/27/24 2012]   BP Pulse Resp Temp SpO2   124/84 (!) 114 16 98.5 °F (36.9 °C) 99 %      MAP       --         Physical Exam    Nursing note and vitals reviewed.  Constitutional: He appears well-developed and well-nourished.   HENT:   Head: Normocephalic and atraumatic.   Mouth/Throat: Mucous membranes are normal.   Eyes: Pupils are equal, round, and reactive to light.   Neck:   Normal range of motion.  Cardiovascular:  Normal rate, regular rhythm and normal heart sounds.           Pulmonary/Chest: Breath sounds normal.   Musculoskeletal:         General: Normal range of motion.      Cervical back: Normal range of motion.     Neurological: He is alert and oriented to person, place, and time.   Skin: Skin is warm and dry. Capillary  refill takes less than 2 seconds.   Psychiatric: He has a normal mood and affect. His behavior is normal. Judgment and thought content normal.         ED Course   Procedures  Labs Reviewed   THROAT SCREEN, RAPID STREP - Normal   RAPID INFLUENZA A/B - Normal   SARS-COV-2 RNA AMPLIFICATION, QUAL - Normal          Imaging Results    None          Medications - No data to display  Medical Decision Making  Problems Addressed:  Viral illness: self-limited or minor problem                                      Clinical Impression:  Final diagnoses:  [B34.9] Viral illness (Primary)          ED Disposition Condition    Discharge Stable          ED Prescriptions    None       Follow-up Information       Follow up With Specialties Details Why Contact Info    Maribell Witt FNP Family Medicine Call  As needed [address] [place] [id number]  [phone]               Christianne Gaspar FNP  02/27/24 3203

## 2024-02-28 NOTE — TELEPHONE ENCOUNTER
Please call pt and schedule a lab draw, he was a n/s on 2/26/24 and has appt with Maribell on 3/4/24

## 2024-02-29 ENCOUNTER — TELEPHONE (OUTPATIENT)
Dept: FAMILY MEDICINE | Facility: CLINIC | Age: 29
End: 2024-02-29
Payer: MEDICARE

## 2024-03-02 ENCOUNTER — LAB VISIT (OUTPATIENT)
Dept: LAB | Facility: HOSPITAL | Age: 29
End: 2024-03-02
Attending: NURSE PRACTITIONER
Payer: MEDICARE

## 2024-03-02 DIAGNOSIS — E78.5 HYPERLIPIDEMIA, UNSPECIFIED HYPERLIPIDEMIA TYPE: ICD-10-CM

## 2024-03-02 DIAGNOSIS — Z79.4 TYPE 2 DIABETES MELLITUS WITHOUT COMPLICATION, WITH LONG-TERM CURRENT USE OF INSULIN: ICD-10-CM

## 2024-03-02 DIAGNOSIS — E11.9 TYPE 2 DIABETES MELLITUS WITHOUT COMPLICATION, WITH LONG-TERM CURRENT USE OF INSULIN: ICD-10-CM

## 2024-03-02 DIAGNOSIS — Z79.899 OTHER LONG TERM (CURRENT) DRUG THERAPY: ICD-10-CM

## 2024-03-02 DIAGNOSIS — F41.9 ANXIETY: ICD-10-CM

## 2024-03-02 LAB
ALBUMIN SERPL-MCNC: 4.4 G/DL (ref 3.4–5)
ALBUMIN/GLOB SERPL: 1.4 RATIO
ALP SERPL-CCNC: 70 UNIT/L (ref 50–144)
ALT SERPL-CCNC: 40 UNIT/L (ref 1–45)
ANION GAP SERPL CALC-SCNC: 4 MEQ/L (ref 2–13)
AST SERPL-CCNC: 36 UNIT/L (ref 17–59)
BASOPHILS # BLD AUTO: 0.05 X10(3)/MCL (ref 0.01–0.08)
BASOPHILS NFR BLD AUTO: 0.5 % (ref 0.1–1.2)
BILIRUB SERPL-MCNC: 0.9 MG/DL (ref 0–1)
BUN SERPL-MCNC: 11 MG/DL (ref 7–20)
CALCIUM SERPL-MCNC: 9.8 MG/DL (ref 8.4–10.2)
CHLORIDE SERPL-SCNC: 102 MMOL/L (ref 98–110)
CHOLEST SERPL-MCNC: 99 MG/DL (ref 0–200)
CO2 SERPL-SCNC: 33 MMOL/L (ref 21–32)
CREAT SERPL-MCNC: 0.63 MG/DL (ref 0.66–1.25)
CREAT/UREA NIT SERPL: 17 (ref 12–20)
EOSINOPHIL # BLD AUTO: 0.4 X10(3)/MCL (ref 0.04–0.54)
EOSINOPHIL NFR BLD AUTO: 3.9 % (ref 0.7–7)
ERYTHROCYTE [DISTWIDTH] IN BLOOD BY AUTOMATED COUNT: 13.2 %
EST. AVERAGE GLUCOSE BLD GHB EST-MCNC: 231.7 MG/DL (ref 70–115)
GFR SERPLBLD CREATININE-BSD FMLA CKD-EPI: >90 MLS/MIN/1.73/M2
GLOBULIN SER-MCNC: 3.1 GM/DL (ref 2–3.9)
GLUCOSE SERPL-MCNC: 62 MG/DL (ref 70–115)
HBA1C MFR BLD: 9.7 % (ref 4–6)
HCT VFR BLD AUTO: 44 % (ref 36–52)
HDLC SERPL-MCNC: 32 MG/DL (ref 40–60)
HGB BLD-MCNC: 14.7 G/DL (ref 13–18)
IMM GRANULOCYTES # BLD AUTO: 0.04 X10(3)/MCL (ref 0–0.03)
IMM GRANULOCYTES NFR BLD AUTO: 0.4 % (ref 0–0.5)
LDLC SERPL DIRECT ASSAY-SCNC: 56.2 MG/DL (ref 30–100)
LYMPHOCYTES # BLD AUTO: 3.29 X10(3)/MCL (ref 1.32–3.57)
LYMPHOCYTES NFR BLD AUTO: 31.8 % (ref 20–55)
MCH RBC QN AUTO: 29.2 PG (ref 27–34)
MCHC RBC AUTO-ENTMCNC: 33.4 G/DL (ref 31–37)
MCV RBC AUTO: 87.3 FL (ref 79–99)
MONOCYTES # BLD AUTO: 0.91 X10(3)/MCL (ref 0.3–0.82)
MONOCYTES NFR BLD AUTO: 8.8 % (ref 4.7–12.5)
NEUTROPHILS # BLD AUTO: 5.64 X10(3)/MCL (ref 1.78–5.38)
NEUTROPHILS NFR BLD AUTO: 54.6 % (ref 37–73)
NRBC BLD AUTO-RTO: 0 %
PLATELET # BLD AUTO: 330 X10(3)/MCL (ref 140–371)
PMV BLD AUTO: 8.5 FL (ref 9.4–12.4)
POTASSIUM SERPL-SCNC: 3.9 MMOL/L (ref 3.5–5.1)
PROT SERPL-MCNC: 7.5 GM/DL (ref 6.3–8.2)
RBC # BLD AUTO: 5.04 X10(6)/MCL (ref 4–6)
SODIUM SERPL-SCNC: 139 MMOL/L (ref 135–145)
TRIGL SERPL-MCNC: 115 MG/DL (ref 30–200)
TSH SERPL-ACNC: 1.24 UIU/ML (ref 0.36–3.74)
VIT B12 SERPL-MCNC: 498 PG/ML (ref 211–946)
WBC # SPEC AUTO: 10.33 X10(3)/MCL (ref 4–11.5)

## 2024-03-02 PROCEDURE — 83036 HEMOGLOBIN GLYCOSYLATED A1C: CPT

## 2024-03-02 PROCEDURE — 82607 VITAMIN B-12: CPT

## 2024-03-02 PROCEDURE — 80061 LIPID PANEL: CPT

## 2024-03-02 PROCEDURE — 80053 COMPREHEN METABOLIC PANEL: CPT

## 2024-03-02 PROCEDURE — 84443 ASSAY THYROID STIM HORMONE: CPT

## 2024-03-02 PROCEDURE — 85025 COMPLETE CBC W/AUTO DIFF WBC: CPT

## 2024-03-02 PROCEDURE — 36415 COLL VENOUS BLD VENIPUNCTURE: CPT

## 2024-03-12 ENCOUNTER — TELEPHONE (OUTPATIENT)
Dept: FAMILY MEDICINE | Facility: CLINIC | Age: 29
End: 2024-03-12
Payer: MEDICARE

## 2024-03-12 DIAGNOSIS — F32.A DEPRESSION, UNSPECIFIED DEPRESSION TYPE: Primary | ICD-10-CM

## 2024-03-12 RX ORDER — CITALOPRAM 10 MG/1
10 TABLET ORAL DAILY
Qty: 90 TABLET | Refills: 3 | Status: SHIPPED | OUTPATIENT
Start: 2024-03-12

## 2024-03-13 ENCOUNTER — OFFICE VISIT (OUTPATIENT)
Dept: FAMILY MEDICINE | Facility: CLINIC | Age: 29
End: 2024-03-13
Payer: MEDICARE

## 2024-03-13 VITALS
WEIGHT: 278 LBS | SYSTOLIC BLOOD PRESSURE: 116 MMHG | BODY MASS INDEX: 36.84 KG/M2 | HEIGHT: 73 IN | TEMPERATURE: 98 F | OXYGEN SATURATION: 98 % | DIASTOLIC BLOOD PRESSURE: 72 MMHG | HEART RATE: 91 BPM

## 2024-03-13 DIAGNOSIS — E66.01 MORBID (SEVERE) OBESITY DUE TO EXCESS CALORIES: ICD-10-CM

## 2024-03-13 DIAGNOSIS — Z79.4 TYPE 2 DIABETES MELLITUS WITH HYPERGLYCEMIA, WITH LONG-TERM CURRENT USE OF INSULIN: ICD-10-CM

## 2024-03-13 DIAGNOSIS — E78.2 MIXED HYPERLIPIDEMIA: ICD-10-CM

## 2024-03-13 DIAGNOSIS — E11.65 TYPE 2 DIABETES MELLITUS WITH HYPERGLYCEMIA, WITH LONG-TERM CURRENT USE OF INSULIN: ICD-10-CM

## 2024-03-13 DIAGNOSIS — J01.90 ACUTE NON-RECURRENT SINUSITIS, UNSPECIFIED LOCATION: Primary | ICD-10-CM

## 2024-03-13 DIAGNOSIS — F84.0 AUTISM: ICD-10-CM

## 2024-03-13 PROBLEM — B34.9 VIRAL ILLNESS: Status: RESOLVED | Noted: 2023-12-19 | Resolved: 2024-03-13

## 2024-03-13 PROBLEM — U07.1 SARS-COV-2 POSITIVE: Status: RESOLVED | Noted: 2024-01-18 | Resolved: 2024-03-13

## 2024-03-13 PROBLEM — F31.9 BIPOLAR DISORDER, UNSPECIFIED: Status: ACTIVE | Noted: 2024-03-13

## 2024-03-13 PROBLEM — E11.9 TYPE 2 DIABETES MELLITUS WITHOUT COMPLICATIONS: Status: RESOLVED | Noted: 2024-01-24 | Resolved: 2024-03-13

## 2024-03-13 PROCEDURE — 1159F MED LIST DOCD IN RCRD: CPT | Mod: ,,, | Performed by: NURSE PRACTITIONER

## 2024-03-13 PROCEDURE — 3074F SYST BP LT 130 MM HG: CPT | Mod: ,,, | Performed by: NURSE PRACTITIONER

## 2024-03-13 PROCEDURE — 3008F BODY MASS INDEX DOCD: CPT | Mod: ,,, | Performed by: NURSE PRACTITIONER

## 2024-03-13 PROCEDURE — 3046F HEMOGLOBIN A1C LEVEL >9.0%: CPT | Mod: ,,, | Performed by: NURSE PRACTITIONER

## 2024-03-13 PROCEDURE — 1160F RVW MEDS BY RX/DR IN RCRD: CPT | Mod: ,,, | Performed by: NURSE PRACTITIONER

## 2024-03-13 PROCEDURE — 3078F DIAST BP <80 MM HG: CPT | Mod: ,,, | Performed by: NURSE PRACTITIONER

## 2024-03-13 PROCEDURE — 4010F ACE/ARB THERAPY RXD/TAKEN: CPT | Mod: ,,, | Performed by: NURSE PRACTITIONER

## 2024-03-13 PROCEDURE — 99214 OFFICE O/P EST MOD 30 MIN: CPT | Mod: ,,, | Performed by: NURSE PRACTITIONER

## 2024-03-13 RX ORDER — FLUTICASONE PROPIONATE 50 MCG
2 SPRAY, SUSPENSION (ML) NASAL DAILY
Qty: 15.8 ML | Refills: 0 | Status: SHIPPED | OUTPATIENT
Start: 2024-03-13

## 2024-03-13 RX ORDER — AMOXICILLIN AND CLAVULANATE POTASSIUM 875; 125 MG/1; MG/1
1 TABLET, FILM COATED ORAL 2 TIMES DAILY
Qty: 20 TABLET | Refills: 0 | Status: SHIPPED | OUTPATIENT
Start: 2024-03-13 | End: 2024-03-23

## 2024-03-13 NOTE — PROGRESS NOTES
Patient ID: Michael Malone  : 1995    Chief Complaint: Results (Lab weeks )    Allergies: Patient is allergic to codeine.     History of Present Illness:  The patient is a 28 y.o. Black or  male who presents to clinic for follow up on Results (Lab weeks ).  Reports diet very high in sugar-eating candy and drinking sprite on a daily basis.  He also remains very sedentary.      He complains of sneezing, runny nose, productive cough, nasal congestion, sore throat, and fatigue.  Onset of symptoms 2 weeks with worsening.  Denies fever or chills.  No shortness of breath or wheezing.  Taking cetirizine daily without improvement.    Mother is requesting a referral to Neurology.  Diagnosed with autism at age 10 and currently followed by Julia Solorzano for psychiatry.     Social History:  reports that he has never smoked. He has never used smokeless tobacco. He reports that he does not currently use alcohol. He reports that he does not use drugs.    Past Medical History:  has a past medical history of Anxiety, Autism, Diabetes mellitus, Diabetes mellitus, type 2, and Hyperlipidemia.    Current Medications:  Current Outpatient Medications   Medication Instructions    amoxicillin-clavulanate 875-125mg (AUGMENTIN) 875-125 mg per tablet 1 tablet, Oral, 2 times daily    ARIPiprazole (ABILIFY) 5 mg, Oral, Daily    atorvastatin (LIPITOR) 10 mg, Oral, Daily    cetirizine (ZYRTEC) 10 mg, Oral, Daily    citalopram (CELEXA) 10 mg, Oral, Daily    clonazePAM (KLONOPIN) 1 mg, Oral, Daily    donepeziL (ARICEPT) 10 mg, Oral, Daily    FARXIGA 10 mg, Oral, Daily    fluticasone propionate (FLONASE) 100 mcg, Each Nostril, Daily    lisinopriL (PRINIVIL,ZESTRIL) 20 mg, Oral, Daily    metFORMIN (GLUCOPHAGE) 1,000 mg, Oral, With breakfast    montelukast (SINGULAIR) 5 mg, Oral, Daily    pioglitazone (ACTOS) 30 mg, Oral, Daily    SAXagliptin (ONGLYZA) 5 mg, Oral, Daily    TOUJEO SOLOSTAR U-300 INSULIN 60 Units, Subcutaneous,  "Daily    traZODone (DESYREL) 150-300 mg, Oral, Nightly PRN    TRULICITY 4.5 mg, Subcutaneous, Every 7 days    VASCEPA 1 gram Cap 2 capsules, Oral, Nightly       ROS: See HPI    Visit Vitals  /72 (BP Location: Right arm)   Pulse 91   Temp 97.9 °F (36.6 °C) (Temporal)   Ht 6' 1" (1.854 m)   Wt 126.1 kg (278 lb)   SpO2 98%   BMI 36.68 kg/m²       Physical Exam  Vitals reviewed.   Constitutional:       Appearance: Normal appearance. He is obese.   HENT:      Ears:      Comments: Cloudy effusions bilaterally     Nose: Congestion and rhinorrhea present.      Mouth/Throat:      Pharynx: Posterior oropharyngeal erythema present.   Cardiovascular:      Rate and Rhythm: Normal rate and regular rhythm.      Heart sounds: Normal heart sounds.   Pulmonary:      Effort: Pulmonary effort is normal.      Breath sounds: Normal breath sounds.   Musculoskeletal:      Cervical back: Normal range of motion and neck supple. No tenderness.   Lymphadenopathy:      Cervical: No cervical adenopathy.   Skin:     General: Skin is warm and dry.   Neurological:      Mental Status: He is alert and oriented to person, place, and time. Mental status is at baseline.          Labs Reviewed:  Chemistry:  Lab Results   Component Value Date     03/02/2024    K 3.9 03/02/2024    CHLORIDE 102 03/02/2024    BUN 11.0 03/02/2024    CREATININE 0.63 (L) 03/02/2024    EGFRNORACEVR >90 03/02/2024    GLUCOSE 62 (L) 03/02/2024    CALCIUM 9.8 03/02/2024    ALKPHOS 70 03/02/2024    LABPROT 7.5 03/02/2024    ALBUMIN 4.4 03/02/2024    AST 36 03/02/2024    ALT 40 03/02/2024    TSH 1.240 03/02/2024        Lab Results   Component Value Date    HGBA1C 9.7 (H) 03/02/2024        Hematology:  Lab Results   Component Value Date    WBC 10.33 03/02/2024    RBC 5.04 03/02/2024    HGB 14.7 03/02/2024    HCT 44.0 03/02/2024    MCV 87.3 03/02/2024    MCH 29.2 03/02/2024    MCHC 33.4 03/02/2024    RDW 13.2 03/02/2024     03/02/2024    MPV 8.5 (L) 03/02/2024 "       Lipid Panel:  Lab Results   Component Value Date    CHOL 99 03/02/2024    HDL 32 (L) 03/02/2024    DLDL 56.2 03/02/2024    TRIG 115 03/02/2024        Assessment & Plan:  1. Acute non-recurrent sinusitis, unspecified location  Assessment & Plan:  Begin Augmentin for 10 days and fluticasone daily  Call office if symptoms worsen or persist after completion of antibiotics    Orders:  -     fluticasone propionate (FLONASE) 50 mcg/actuation nasal spray; 2 sprays (100 mcg total) by Each Nostril route once daily.  Dispense: 15.8 mL; Refill: 0  -     amoxicillin-clavulanate 875-125mg (AUGMENTIN) 875-125 mg per tablet; Take 1 tablet by mouth 2 (two) times daily. for 10 days  Dispense: 20 tablet; Refill: 0    2. Mixed hyperlipidemia  Overview:  On Vascepa and atorvastatin    Assessment & Plan:  Lipid Panel:  Lab Results   Component Value Date    CHOL 99 03/02/2024    HDL 32 (L) 03/02/2024    DLDL 56.2 03/02/2024    TRIG 115 03/02/2024     LDL at goal of less than 70   Continue Vascepa and atorvastatin      3. Type 2 diabetes mellitus with hyperglycemia, with long-term current use of insulin  Overview:  Diabetes labs:   Lab Results   Component Value Date    HGBA1C 9.7 (H) 03/02/2024       On ACE and Statin according to guidelines.  A1c remains elevated.  Counseled on diabetic diet and effects of poorly controlled diabetes. Avoid soda, simple sweets, and limit rice/pasta/breads/starches.  Maintain healthy weight with goal BMI <30.  Exercise 5 times per week for 30 minutes per day.  Stressed importance of daily foot exams.  Educated on importance of annual dilated eye exam.  Continue follow-up with Endocrinology      4. Morbid (severe) obesity due to excess calories  Overview:  Body mass index is 36.68 kg/m².  Goal BMI <30.  Exercise 5 times a week for 30 minutes per day.  Avoid soda, simple sugars, excessive rice, potatoes or bread. Limit fast foods and fried foods.  Choose complex carbs in moderation (example: green  vegetables, beans, oatmeal). Eat plenty of fresh fruits and vegetables with lean meats daily.  Eat a balanced portion size.  Consider permanent healthy life style changes.        5. Autism  Assessment & Plan:  Refer to neurology for further evaluation    Orders:  -     Ambulatory referral/consult to Neurology; Future; Expected date: 03/20/2024         Future Appointments   Date Time Provider Department Center   9/6/2024  8:50 AM LAB, [facility] LABORATORY DRAW STATION [facility]   9/13/2024 10:00 AM Maribell Witt FNP [facility]       Follow up in about 6 months (around 9/13/2024) for Wellness, With Fasting Labs Prior. Call sooner if needed.    STACEY Duran    Lab Frequency Next Occurrence   Vitamin D Once 07/24/2024

## 2024-03-13 NOTE — ASSESSMENT & PLAN NOTE
Begin Augmentin for 10 days and fluticasone daily  Call office if symptoms worsen or persist after completion of antibiotics

## 2024-03-13 NOTE — ASSESSMENT & PLAN NOTE
Lipid Panel:  Lab Results   Component Value Date    CHOL 99 03/02/2024    HDL 32 (L) 03/02/2024    DLDL 56.2 03/02/2024    TRIG 115 03/02/2024     LDL at goal of less than 70   Continue Vascepa and atorvastatin

## 2024-03-25 ENCOUNTER — TELEPHONE (OUTPATIENT)
Dept: FAMILY MEDICINE | Facility: CLINIC | Age: 29
End: 2024-03-25
Payer: MEDICARE

## 2024-03-25 DIAGNOSIS — E78.2 MIXED HYPERLIPIDEMIA: Primary | ICD-10-CM

## 2024-03-25 RX ORDER — ICOSAPENT ETHYL 1000 MG/1
2 CAPSULE ORAL NIGHTLY
Qty: 60 CAPSULE | Refills: 5 | Status: SHIPPED | OUTPATIENT
Start: 2024-03-25

## 2024-04-29 ENCOUNTER — TELEPHONE (OUTPATIENT)
Dept: FAMILY MEDICINE | Facility: CLINIC | Age: 29
End: 2024-04-29
Payer: MEDICARE

## 2024-04-29 DIAGNOSIS — E11.65 TYPE 2 DIABETES MELLITUS WITH HYPERGLYCEMIA, WITH LONG-TERM CURRENT USE OF INSULIN: Primary | ICD-10-CM

## 2024-04-29 DIAGNOSIS — J30.2 SEASONAL ALLERGIES: ICD-10-CM

## 2024-04-29 DIAGNOSIS — Z79.4 TYPE 2 DIABETES MELLITUS WITH HYPERGLYCEMIA, WITH LONG-TERM CURRENT USE OF INSULIN: Primary | ICD-10-CM

## 2024-04-29 RX ORDER — MONTELUKAST SODIUM 5 MG/1
5 TABLET, CHEWABLE ORAL DAILY
Qty: 90 TABLET | Refills: 3 | Status: SHIPPED | OUTPATIENT
Start: 2024-04-29

## 2024-04-29 RX ORDER — DAPAGLIFLOZIN 10 MG/1
10 TABLET, FILM COATED ORAL DAILY
Qty: 90 TABLET | Refills: 3 | Status: SHIPPED | OUTPATIENT
Start: 2024-04-29

## 2024-06-17 PROBLEM — J01.90 ACUTE NON-RECURRENT SINUSITIS: Status: RESOLVED | Noted: 2024-03-13 | Resolved: 2024-06-17

## 2024-06-18 ENCOUNTER — HOSPITAL ENCOUNTER (EMERGENCY)
Facility: HOSPITAL | Age: 29
Discharge: HOME | End: 2024-06-18
Attending: SURGERY
Payer: MEDICARE

## 2024-06-18 VITALS
HEIGHT: 73 IN | OXYGEN SATURATION: 100 % | RESPIRATION RATE: 18 BRPM | WEIGHT: 267.88 LBS | DIASTOLIC BLOOD PRESSURE: 95 MMHG | SYSTOLIC BLOOD PRESSURE: 155 MMHG | BODY MASS INDEX: 35.5 KG/M2 | HEART RATE: 92 BPM | TEMPERATURE: 98 F

## 2024-06-18 DIAGNOSIS — J30.2 SEASONAL ALLERGIC RHINITIS, UNSPECIFIED TRIGGER: Primary | ICD-10-CM

## 2024-06-18 LAB — RAPID GROUP A STREP (OHS): NEGATIVE

## 2024-06-18 PROCEDURE — 87651 STREP A DNA AMP PROBE: CPT | Performed by: SURGERY

## 2024-06-18 PROCEDURE — 99283 EMERGENCY DEPT VISIT LOW MDM: CPT

## 2024-06-18 PROCEDURE — 25000003 PHARM REV CODE 250: Performed by: SURGERY

## 2024-06-18 RX ORDER — HYDROXYZINE PAMOATE 25 MG/1
25 CAPSULE ORAL 4 TIMES DAILY
Qty: 20 CAPSULE | Refills: 0 | Status: SHIPPED | OUTPATIENT
Start: 2024-06-18 | End: 2024-06-23

## 2024-06-18 RX ORDER — HYDROXYZINE PAMOATE 25 MG/1
25 CAPSULE ORAL ONCE
Status: COMPLETED | OUTPATIENT
Start: 2024-06-18 | End: 2024-06-18

## 2024-06-18 RX ADMIN — HYDROXYZINE PAMOATE 25 MG: 25 CAPSULE ORAL at 09:06

## 2024-06-18 NOTE — ED PROVIDER NOTES
Encounter Date: 6/18/2024       History     Chief Complaint   Patient presents with    Cough     Complains of sore throat and cough onset yesterday.     29 YO OBESE MALE W/ ODYNOPHAGIA & NONPRODUCTIVE COUGH X 3 DAYS.  NO FC.  NO TOB.  NO OTALGIA.  NO HEMOPTYSIS.  NO SOB.  NO OTHER C/O.        Review of patient's allergies indicates:   Allergen Reactions    Codeine      Past Medical History:   Diagnosis Date    Anxiety     Autism     Diabetes mellitus     Diabetes mellitus, type 2     Hyperlipidemia      Past Surgical History:   Procedure Laterality Date    ADENOIDECTOMY      TONSILLECTOMY      WRIST SURGERY       Family History   Problem Relation Name Age of Onset    Hypertension Mother      Hyperlipidemia Mother      Breast cancer Mother      Anxiety disorder Mother      Depression Mother      Hypertension Father      Kidney failure Father      Heart failure Father      Diabetes type II Father      Rheum arthritis Father      Hyperlipidemia Father       Social History     Tobacco Use    Smoking status: Never    Smokeless tobacco: Never   Substance Use Topics    Alcohol use: Not Currently    Drug use: Never     Review of Systems   All other systems reviewed and are negative.      Physical Exam     Initial Vitals [06/18/24 0904]   BP Pulse Resp Temp SpO2   (!) 155/95 92 18 97.8 °F (36.6 °C) 100 %      MAP       --         Physical Exam    Nursing note and vitals reviewed.  Constitutional: He appears well-developed and well-nourished.   HENT:   Head: Normocephalic and atraumatic.   Right Ear: External ear normal.   Left Ear: External ear normal.   Nose: Nose normal.   Mouth/Throat: Oropharynx is clear and moist. No oropharyngeal exudate.   Eyes: Conjunctivae and EOM are normal. Pupils are equal, round, and reactive to light. Right eye exhibits no discharge. Left eye exhibits no discharge.   Neck: Neck supple. No thyromegaly present. No tracheal deviation present. No JVD present.   Normal range of  motion.  Cardiovascular:  Normal rate, regular rhythm, normal heart sounds and intact distal pulses.     Exam reveals no gallop.       No murmur heard.  Pulmonary/Chest: Breath sounds normal. No stridor. No respiratory distress. He has no wheezes. He has no rhonchi. He has no rales.   Abdominal: Abdomen is soft. Bowel sounds are normal. He exhibits no distension and no mass. There is no abdominal tenderness. There is no rebound and no guarding.   Musculoskeletal:         General: Normal range of motion.      Cervical back: Normal range of motion and neck supple.     Lymphadenopathy:     He has no cervical adenopathy.   Neurological: He is alert and oriented to person, place, and time. He has normal strength. No cranial nerve deficit or sensory deficit. GCS score is 15. GCS eye subscore is 4. GCS verbal subscore is 5. GCS motor subscore is 6.   Skin: Skin is warm. Capillary refill takes less than 2 seconds.   Psychiatric: He has a normal mood and affect. His behavior is normal. Judgment and thought content normal.         ED Course   Procedures  Labs Reviewed   THROAT SCREEN, RAPID STREP - Normal          Imaging Results    None          Medications   hydrOXYzine pamoate capsule 25 mg (has no administration in time range)     Medical Decision Making                                    Clinical Impression:  Final diagnoses:  [J30.2] Seasonal allergic rhinitis, unspecified trigger (Primary)          ED Disposition Condition    Discharge Stable          ED Prescriptions       Medication Sig Dispense Start Date End Date Auth. Provider    hydrOXYzine pamoate (VISTARIL) 25 MG Cap Take 1 capsule (25 mg total) by mouth 4 (four) times daily. for 5 days 20 capsule 6/18/2024 6/23/2024 Preston Crews MD          Follow-up Information    None          Preston Crews MD  06/18/24 3593

## 2024-06-18 NOTE — Clinical Note
"Michael Malone (Colby) was seen and treated in our emergency department on 6/18/2024.  He may return to work on 06/19/2024.       If you have any questions or concerns, please don't hesitate to call.       RN    "

## 2024-07-02 ENCOUNTER — ANESTHESIA EVENT (OUTPATIENT)
Dept: EMERGENCY MEDICINE | Facility: HOSPITAL | Age: 29
End: 2024-07-02
Payer: MEDICARE

## 2024-07-02 ENCOUNTER — ANESTHESIA (OUTPATIENT)
Dept: EMERGENCY MEDICINE | Facility: HOSPITAL | Age: 29
End: 2024-07-02
Payer: MEDICARE

## 2024-07-02 ENCOUNTER — HOSPITAL ENCOUNTER (EMERGENCY)
Facility: HOSPITAL | Age: 29
Discharge: ANOTHER HOSPITAL | End: 2024-07-03
Attending: INTERNAL MEDICINE
Payer: MEDICARE

## 2024-07-02 DIAGNOSIS — R05.9 COUGH: ICD-10-CM

## 2024-07-02 DIAGNOSIS — D72.829 LEUKOCYTOSIS, UNSPECIFIED TYPE: ICD-10-CM

## 2024-07-02 DIAGNOSIS — R51.9 NONINTRACTABLE HEADACHE, UNSPECIFIED CHRONICITY PATTERN, UNSPECIFIED HEADACHE TYPE: Primary | ICD-10-CM

## 2024-07-02 LAB
ABS NEUT CALC (OHS): 18.22 X10(3)/MCL (ref 2.1–9.2)
ALBUMIN SERPL-MCNC: 4.6 G/DL (ref 3.4–5)
ALBUMIN/GLOB SERPL: 1.3 RATIO
ALP SERPL-CCNC: 88 UNIT/L (ref 50–144)
ALT SERPL-CCNC: 29 UNIT/L (ref 1–45)
ANION GAP SERPL CALC-SCNC: 11 MEQ/L (ref 2–13)
AST SERPL-CCNC: 49 UNIT/L (ref 17–59)
BILIRUB SERPL-MCNC: 1 MG/DL (ref 0–1)
BILIRUB UR QL STRIP.AUTO: NEGATIVE
BUN SERPL-MCNC: 11 MG/DL (ref 7–20)
CALCIUM SERPL-MCNC: 9.7 MG/DL (ref 8.4–10.2)
CHLORIDE SERPL-SCNC: 96 MMOL/L (ref 98–110)
CK SERPL-CCNC: 78 U/L (ref 55–170)
CLARITY UR: CLEAR
CO2 SERPL-SCNC: 26 MMOL/L (ref 21–32)
COLOR UR AUTO: YELLOW
CREAT SERPL-MCNC: 0.6 MG/DL (ref 0.66–1.25)
CREAT/UREA NIT SERPL: 18 (ref 12–20)
ERYTHROCYTE [DISTWIDTH] IN BLOOD BY AUTOMATED COUNT: 13 %
GFR SERPLBLD CREATININE-BSD FMLA CKD-EPI: >90 ML/MIN/1.73/M2
GLOBULIN SER-MCNC: 3.5 GM/DL (ref 2–3.9)
GLUCOSE SERPL-MCNC: 250 MG/DL (ref 70–115)
GLUCOSE UR QL STRIP: >=1000
HCT VFR BLD AUTO: 40.9 % (ref 36–52)
HGB BLD-MCNC: 14.3 G/DL (ref 13–18)
HGB UR QL STRIP: NEGATIVE
INFLUENZA A (OHS): NEGATIVE
INFLUENZA B (OHS): NEGATIVE
KETONES UR QL STRIP: NEGATIVE
LACTATE SERPL-SCNC: 1.1 MMOL/L (ref 0.4–2)
LEUKOCYTE ESTERASE UR QL STRIP: NEGATIVE
LIPASE SERPL-CCNC: 126 U/L (ref 23–300)
LYMPHOCYTES NFR BLD MANUAL: 2.08 X10(3)/MCL
LYMPHOCYTES NFR BLD MANUAL: 9 % (ref 20–55)
MCH RBC QN AUTO: 29.6 PG (ref 27–34)
MCHC RBC AUTO-ENTMCNC: 35 G/DL (ref 31–37)
MCV RBC AUTO: 84.7 FL (ref 79–99)
MONOCYTES NFR BLD MANUAL: 12 % (ref 0–10)
MONOCYTES NFR BLD MANUAL: 2.77 X10(3)/MCL (ref 0.1–1.3)
NEUTROPHILS NFR BLD MANUAL: 79 % (ref 37–73)
NITRITE UR QL STRIP: NEGATIVE
NRBC BLD AUTO-RTO: 0 %
PH UR STRIP: 6 [PH]
PLATELET # BLD AUTO: 293 X10(3)/MCL (ref 140–371)
PMV BLD AUTO: 8.7 FL (ref 9.4–12.4)
POTASSIUM SERPL-SCNC: 4.6 MMOL/L (ref 3.5–5.1)
PROT SERPL-MCNC: 8.1 GM/DL (ref 6.3–8.2)
PROT UR QL STRIP: NEGATIVE
RAPID GROUP A STREP (OHS): NEGATIVE
RBC # BLD AUTO: 4.83 X10(6)/MCL (ref 4–6)
SARS-COV-2 RDRP RESP QL NAA+PROBE: NEGATIVE
SODIUM SERPL-SCNC: 133 MMOL/L (ref 136–145)
SP GR UR STRIP.AUTO: <=1.005 (ref 1–1.03)
UROBILINOGEN UR STRIP-ACNC: 0.2
WBC # BLD AUTO: 23.06 X10(3)/MCL (ref 4–11.5)

## 2024-07-02 PROCEDURE — 87651 STREP A DNA AMP PROBE: CPT | Performed by: INTERNAL MEDICINE

## 2024-07-02 PROCEDURE — 99285 EMERGENCY DEPT VISIT HI MDM: CPT | Mod: 25

## 2024-07-02 PROCEDURE — 96374 THER/PROPH/DIAG INJ IV PUSH: CPT

## 2024-07-02 PROCEDURE — 80053 COMPREHEN METABOLIC PANEL: CPT | Performed by: INTERNAL MEDICINE

## 2024-07-02 PROCEDURE — 63600175 PHARM REV CODE 636 W HCPCS: Performed by: INTERNAL MEDICINE

## 2024-07-02 PROCEDURE — 96375 TX/PRO/DX INJ NEW DRUG ADDON: CPT

## 2024-07-02 PROCEDURE — 85025 COMPLETE CBC W/AUTO DIFF WBC: CPT | Performed by: INTERNAL MEDICINE

## 2024-07-02 PROCEDURE — 87040 BLOOD CULTURE FOR BACTERIA: CPT | Performed by: FAMILY MEDICINE

## 2024-07-02 PROCEDURE — 25000003 PHARM REV CODE 250: Performed by: INTERNAL MEDICINE

## 2024-07-02 PROCEDURE — 25000003 PHARM REV CODE 250: Performed by: NURSE ANESTHETIST, CERTIFIED REGISTERED

## 2024-07-02 PROCEDURE — 82550 ASSAY OF CK (CPK): CPT | Performed by: INTERNAL MEDICINE

## 2024-07-02 PROCEDURE — 83690 ASSAY OF LIPASE: CPT | Performed by: INTERNAL MEDICINE

## 2024-07-02 PROCEDURE — 36415 COLL VENOUS BLD VENIPUNCTURE: CPT | Performed by: INTERNAL MEDICINE

## 2024-07-02 PROCEDURE — 83605 ASSAY OF LACTIC ACID: CPT | Performed by: FAMILY MEDICINE

## 2024-07-02 PROCEDURE — 87400 INFLUENZA A/B EACH AG IA: CPT | Performed by: INTERNAL MEDICINE

## 2024-07-02 PROCEDURE — U0002 COVID-19 LAB TEST NON-CDC: HCPCS | Performed by: INTERNAL MEDICINE

## 2024-07-02 PROCEDURE — 62270 DX LMBR SPI PNXR: CPT

## 2024-07-02 PROCEDURE — 37000009 HC ANESTHESIA EA ADD 15 MINS: Performed by: NURSE ANESTHETIST, CERTIFIED REGISTERED

## 2024-07-02 PROCEDURE — 37000008 HC ANESTHESIA 1ST 15 MINUTES: Performed by: NURSE ANESTHETIST, CERTIFIED REGISTERED

## 2024-07-02 PROCEDURE — 63600175 PHARM REV CODE 636 W HCPCS: Performed by: FAMILY MEDICINE

## 2024-07-02 PROCEDURE — 25000003 PHARM REV CODE 250: Performed by: FAMILY MEDICINE

## 2024-07-02 PROCEDURE — 81003 URINALYSIS AUTO W/O SCOPE: CPT | Performed by: FAMILY MEDICINE

## 2024-07-02 RX ORDER — MORPHINE SULFATE 2 MG/ML
2 INJECTION, SOLUTION INTRAMUSCULAR; INTRAVENOUS
Status: COMPLETED | OUTPATIENT
Start: 2024-07-02 | End: 2024-07-02

## 2024-07-02 RX ORDER — KETOROLAC TROMETHAMINE 30 MG/ML
30 INJECTION, SOLUTION INTRAMUSCULAR; INTRAVENOUS
Status: COMPLETED | OUTPATIENT
Start: 2024-07-02 | End: 2024-07-02

## 2024-07-02 RX ORDER — ONDANSETRON HYDROCHLORIDE 2 MG/ML
4 INJECTION, SOLUTION INTRAVENOUS
Status: COMPLETED | OUTPATIENT
Start: 2024-07-02 | End: 2024-07-02

## 2024-07-02 RX ORDER — HYDROMORPHONE HYDROCHLORIDE 1 MG/ML
1 INJECTION, SOLUTION INTRAMUSCULAR; INTRAVENOUS; SUBCUTANEOUS
Status: COMPLETED | OUTPATIENT
Start: 2024-07-02 | End: 2024-07-02

## 2024-07-02 RX ADMIN — ONDANSETRON 4 MG: 2 INJECTION INTRAMUSCULAR; INTRAVENOUS at 05:07

## 2024-07-02 RX ADMIN — DEXTROSE MONOHYDRATE 2 G: 5 INJECTION INTRAVENOUS at 07:07

## 2024-07-02 RX ADMIN — SODIUM CHLORIDE 1000 ML: 9 INJECTION, SOLUTION INTRAVENOUS at 08:07

## 2024-07-02 RX ADMIN — SODIUM CHLORIDE: 9 INJECTION, SOLUTION INTRAVENOUS at 10:07

## 2024-07-02 RX ADMIN — MORPHINE SULFATE 2 MG: 2 INJECTION, SOLUTION INTRAMUSCULAR; INTRAVENOUS at 07:07

## 2024-07-02 RX ADMIN — VANCOMYCIN HYDROCHLORIDE 1000 MG: 1 INJECTION, POWDER, LYOPHILIZED, FOR SOLUTION INTRAVENOUS at 08:07

## 2024-07-02 RX ADMIN — HYDROMORPHONE HYDROCHLORIDE 1 MG: 1 INJECTION, SOLUTION INTRAMUSCULAR; INTRAVENOUS; SUBCUTANEOUS at 09:07

## 2024-07-02 RX ADMIN — SODIUM CHLORIDE 1000 ML: 9 INJECTION, SOLUTION INTRAVENOUS at 05:07

## 2024-07-02 RX ADMIN — KETOROLAC TROMETHAMINE 30 MG: 30 INJECTION, SOLUTION INTRAMUSCULAR at 08:07

## 2024-07-02 RX ADMIN — KETOROLAC TROMETHAMINE 30 MG: 30 INJECTION, SOLUTION INTRAMUSCULAR at 05:07

## 2024-07-02 NOTE — ED PROVIDER NOTES
Encounter Date: 7/2/2024       History     Chief Complaint   Patient presents with    Cough    Headache    Heat Exposure     Pt presented to ED per POV with c/o generalized weakness, cough and headache onset yesterday. 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      This is a 28-year-old male patient came into the emergency room with history of having fatigue, cough, headache, congestion.  Patient also reports having nausea and vomiting.  Patient states that he was exposed to heat yesterday.  Patient also reports having sore throat.  Reports body pains.  No recent sick contacts.  Denies any abdominal pain.  No chest pain shortness of breath or palpitations.  No discomfort in urination.  No flank pain.    Patient seen on assumption of care from Dr. Angeles patient is stable the time of transfer of care.        Review of patient's allergies indicates:   Allergen Reactions    Codeine      Past Medical History:   Diagnosis Date    Anxiety     Autism     Diabetes mellitus     Diabetes mellitus, type 2     Hyperlipidemia      Past Surgical History:   Procedure Laterality Date    ADENOIDECTOMY      TONSILLECTOMY      WRIST SURGERY       Family History   Problem Relation Name Age of Onset    Hypertension Mother      Hyperlipidemia Mother      Breast cancer Mother      Anxiety disorder Mother      Depression Mother      Hypertension Father      Kidney failure Father      Heart failure Father      Diabetes type II Father      Rheum arthritis Father      Hyperlipidemia Father       Social History     Tobacco Use    Smoking status: Never    Smokeless tobacco: Never   Substance Use Topics    Alcohol use: Not Currently    Drug use: Never     Review of Systems   Constitutional:  Positive for fatigue.   HENT:  Positive for sore throat. Negative for congestion.    Eyes: Negative.    Respiratory:  Negative for cough and shortness of breath.    Cardiovascular:  Negative for chest pain, palpitations  and leg swelling.   Gastrointestinal:  Positive for nausea and vomiting.   Endocrine: Negative.    Genitourinary:  Negative for dysuria and hematuria.   Musculoskeletal:  Positive for myalgias. Negative for neck stiffness.   Skin:  Negative for rash.   Neurological:  Positive for headaches. Negative for syncope and numbness.   All other systems reviewed and are negative.      Physical Exam     Initial Vitals [07/02/24 1533]   BP Pulse Resp Temp SpO2   123/77 (!) 117 20 97.8 °F (36.6 °C) 99 %      MAP       --         Physical Exam    Nursing note and vitals reviewed.  Constitutional: He appears well-developed and well-nourished.   HENT:   Head: Normocephalic and atraumatic.   Eyes: EOM are normal. Pupils are equal, round, and reactive to light.   Neck: Neck supple.   Normal range of motion.  Cardiovascular:  Normal rate, regular rhythm, normal heart sounds and intact distal pulses.           Pulmonary/Chest: Breath sounds normal.   Abdominal: Abdomen is soft. Bowel sounds are normal.   Musculoskeletal:         General: Normal range of motion.      Cervical back: Normal range of motion and neck supple.     Neurological: He is alert and oriented to person, place, and time. GCS score is 15. GCS eye subscore is 4. GCS verbal subscore is 5. GCS motor subscore is 6.   Skin: Skin is warm. Capillary refill takes less than 2 seconds.   Psychiatric: He has a normal mood and affect.         ED Course   Procedures  Labs Reviewed   COMPREHENSIVE METABOLIC PANEL - Abnormal; Notable for the following components:       Result Value    Sodium 133 (*)     Chloride 96 (*)     Glucose 250 (*)     Creatinine 0.60 (*)     All other components within normal limits   CBC WITH DIFFERENTIAL - Abnormal; Notable for the following components:    WBC 23.06 (*)     MPV 8.7 (*)     All other components within normal limits   MANUAL DIFFERENTIAL - Abnormal; Notable for the following components:    Neutrophils % 79 (*)     Lymphs % 9 (*)      Monocytes % 12 (*)     Neutrophils Abs Calc 18.2174 (*)     Monocytes Abs 2.7672 (*)     All other components within normal limits   URINALYSIS, REFLEX TO URINE CULTURE - Abnormal; Notable for the following components:    Glucose, UA >=1000 (*)     All other components within normal limits    Narrative:      URINE STABILITY IS 2 HOURS AT ROOM TEMP OR    SIX HOURS REFRIGERATED. PERFORMING TESTING ON    SPECIMENS GREATER THAN THIS AGE MAY AFFECT THE    FOLLOWING TESTS:    PH          SPECIFIC GRAVITY           BLOOD    CLARITY     BILIRUBIN               UROBILINOGEN   RAPID INFLUENZA A/B - Normal   THROAT SCREEN, RAPID STREP - Normal   CK - Normal   SARS-COV-2 RNA AMPLIFICATION, QUAL - Normal    Narrative:     The IDNOW COVID-19 assay is a rapid molecular in vitro diagnostic test utilizing an isothermal nucleic acid amplification technology intended for the qualitative detection of nucleic acid from the SARS-CoV-2 viral RNA in direct nasal, nasopharyngeal or throat swabs from individuals who are suspected of COVID-19 by their healthcare provider.   LIPASE - Normal   LACTIC ACID, PLASMA - Normal   BLOOD CULTURE OLG   BLOOD CULTURE OLG   CEREBROSPINAL FLUID CULTURE OLG   CEREBROSPINAL FLUID CULTURE OLG   CRYPTOCOCCAL ANTIGEN, CSF   CBC W/ AUTO DIFFERENTIAL    Narrative:     The following orders were created for panel order CBC auto differential.  Procedure                               Abnormality         Status                     ---------                               -----------         ------                     CBC with Differential[[id number]]       Abnormal            Final result               Manual Differential[[id number]]         Abnormal            Final result                 Please view results for these tests on the individual orders.   ACE, CSF   PROTEIN, CSF   DILLON VIRUS DNA BY PCR, CSF   VDRL, CSF   GLUCOSE, CSF   CELL COUNT, CSF   FREEZE AND HOLD -    EBV, CSF, QUALITATIVE, BY RAPID PCR    ENTEROVIRUS RNA BY PCR   HERPES SIMPLEX (HSV) BY RAPID PCR, CSF   LYME DISEASE SEROLOGY, CSF   M. TUBERCULOSIS DNA BY PCR   TOXOPLASMA GONDII/PCR, NON-BLOOD   VARICELLA ZOSTER BY RAPID PCR   [place] VIRUS, PCR, CSF          Imaging Results              X-Ray Chest 1 View (Final result)  Result time 07/02/24 17:50:52      Final result by Randall Harper MD (07/02/24 17:50:52)                   Impression:      No acute abnormality.      Electronically signed by: Randall Harper  Date:    07/02/2024  Time:    17:50               Narrative:    EXAMINATION:  XR CHEST 1 VIEW    CLINICAL HISTORY:  Cough, unspecified    TECHNIQUE:  Single frontal view of the chest was performed.    COMPARISON:  December 26, 2021    FINDINGS:  The lungs are clear, with normal appearance of pulmonary vasculature and no pleural effusion or pneumothorax.    The cardiac silhouette is normal in size. The hilar and mediastinal contours are unremarkable.    Bones are intact.                                       Medications   0.9%  NaCl infusion (has no administration in time range)   ketorolac injection 30 mg (30 mg Intravenous Given 7/2/24 1737)   ondansetron injection 4 mg (4 mg Intravenous Given 7/2/24 1736)   sodium chloride 0.9% bolus 1,000 mL 1,000 mL (1,000 mLs Intravenous New Bag 7/2/24 1742)   cefTRIAXone (ROCEPHIN) 2 g in D5W 100 mL IVPB (MB+) (2 g Intravenous New Bag 7/2/24 1936)   vancomycin (VANCOCIN) 1,000 mg in D5W 250 mL IVPB (Vial-Mate) (1,000 mg Intravenous New Bag 7/2/24 2020)   morphine injection 2 mg (2 mg Intravenous Given 7/2/24 1929)   sodium chloride 0.9% bolus 1,000 mL 1,000 mL (1,000 mLs Intravenous New Bag 7/2/24 2051)   ketorolac injection 30 mg (30 mg Intravenous Given 7/2/24 2051)   HYDROmorphone injection 1 mg (1 mg Intravenous Given 7/2/24 2112)     Medical Decision Making  This is a 28-year-old male patient came into the emergency room with history of having fatigue, cough, headache, congestion.   Patient also reports having nausea and vomiting.  Patient states that he was exposed to heat yesterday.  Patient also reports having sore throat.  Reports body pains.  No recent sick contacts.  Denies any abdominal pain.  No chest pain shortness of breath or palpitations.  No discomfort in urination.  No flank pain.    Differential diagnosis: 1. Influenza 2. Strep pharyngitis 3. COVID 4. Heat exertion 5. Rhabdomyolysis    Rule out meningitis.    Amount and/or Complexity of Data Reviewed  Labs: ordered.  Radiology: ordered.    Risk  Prescription drug management.                                Lumbar tap procedure.  Patient placed in lateral recumbent on left side.  L3 area and lumbar spine area cleansed with chlorhexidine wipes.  5 mL of lidocaine 1% used for local anesthesia.  Spinal tap attempted with 1 attempt.  No fluid obtained.  No signs of traumatic tap or significant bleeding.  Patient tolerated procedure without difficulty.    Patient accepted for transfer by Dr. Arellano at [facility]    Clinical Impression:  Final diagnoses:  [R05.9] Cough  [R51.9] Nonintractable headache, unspecified chronicity pattern, unspecified headache type (Primary)  [D72.829] Leukocytosis, unspecified type          ED Disposition Condition    Transfer to Another Facility Stable                Evaristo Hair MD  07/02/24 1912       Evaristo Hair MD  07/03/24 0025       Evaristo Hair MD  07/03/24 0028       Evaristo Hair MD  07/03/24 0029

## 2024-07-03 ENCOUNTER — HOSPITAL ENCOUNTER (INPATIENT)
Facility: HOSPITAL | Age: 29
LOS: 2 days | Discharge: HOME | DRG: 153 | End: 2024-07-05
Attending: INTERNAL MEDICINE | Admitting: INTERNAL MEDICINE
Payer: MEDICARE

## 2024-07-03 VITALS
HEART RATE: 102 BPM | TEMPERATURE: 98 F | HEIGHT: 73 IN | RESPIRATION RATE: 16 BRPM | WEIGHT: 270 LBS | BODY MASS INDEX: 35.78 KG/M2 | OXYGEN SATURATION: 100 % | DIASTOLIC BLOOD PRESSURE: 74 MMHG | SYSTOLIC BLOOD PRESSURE: 111 MMHG

## 2024-07-03 DIAGNOSIS — R07.9 CHEST PAIN: ICD-10-CM

## 2024-07-03 DIAGNOSIS — E11.65 TYPE 2 DIABETES MELLITUS WITH HYPERGLYCEMIA, WITH LONG-TERM CURRENT USE OF INSULIN: ICD-10-CM

## 2024-07-03 DIAGNOSIS — J06.9 UPPER RESPIRATORY TRACT INFECTION, UNSPECIFIED TYPE: Primary | ICD-10-CM

## 2024-07-03 DIAGNOSIS — D72.829 LEUKOCYTOSIS: ICD-10-CM

## 2024-07-03 DIAGNOSIS — Z79.4 TYPE 2 DIABETES MELLITUS WITH HYPERGLYCEMIA, WITH LONG-TERM CURRENT USE OF INSULIN: ICD-10-CM

## 2024-07-03 DIAGNOSIS — R09.81 SINUS CONGESTION: ICD-10-CM

## 2024-07-03 DIAGNOSIS — E66.01 MORBID (SEVERE) OBESITY DUE TO EXCESS CALORIES: ICD-10-CM

## 2024-07-03 DIAGNOSIS — F31.9 BIPOLAR AFFECTIVE DISORDER, REMISSION STATUS UNSPECIFIED: ICD-10-CM

## 2024-07-03 LAB
ALBUMIN SERPL-MCNC: 3.1 G/DL (ref 3.5–5)
ALBUMIN/GLOB SERPL: 1 RATIO (ref 1.1–2)
ALP SERPL-CCNC: 88 UNIT/L (ref 40–150)
ALT SERPL-CCNC: 16 UNIT/L (ref 0–55)
ANION GAP SERPL CALC-SCNC: 9 MEQ/L
APPEARANCE CSF: CLEAR
APTT PPP: 27.6 SECONDS (ref 23.2–33.7)
AST SERPL-CCNC: 16 UNIT/L (ref 5–34)
B PERT.PT PRMT NPH QL NAA+NON-PROBE: NOT DETECTED
BASOPHILS # BLD AUTO: 0.06 X10(3)/MCL
BASOPHILS NFR BLD AUTO: 0.3 %
BILIRUB SERPL-MCNC: 0.7 MG/DL
BUN SERPL-MCNC: 12.6 MG/DL (ref 8.9–20.6)
C GATTII+NEOFOR DNA CSF QL NAA+NON-PROBE: NOT DETECTED
C PNEUM DNA NPH QL NAA+NON-PROBE: NOT DETECTED
CALCIUM SERPL-MCNC: 8.4 MG/DL (ref 8.4–10.2)
CHLORIDE SERPL-SCNC: 100 MMOL/L (ref 98–107)
CMV DNA CSF QL NAA+NON-PROBE: NOT DETECTED
CO2 SERPL-SCNC: 22 MMOL/L (ref 22–29)
COLOR CSF: COLORLESS
CREAT SERPL-MCNC: 0.96 MG/DL (ref 0.73–1.18)
CREAT/UREA NIT SERPL: 13
E COLI K1 DNA CSF QL NAA+NON-PROBE: NOT DETECTED
EOSINOPHIL # BLD AUTO: 0.1 X10(3)/MCL (ref 0–0.9)
EOSINOPHIL NFR BLD AUTO: 0.5 %
ERYTHROCYTE [DISTWIDTH] IN BLOOD BY AUTOMATED COUNT: 13.3 % (ref 11.5–17)
EST. AVERAGE GLUCOSE BLD GHB EST-MCNC: 243.2 MG/DL
EV RNA CSF QL NAA+NON-PROBE: NOT DETECTED
GFR SERPLBLD CREATININE-BSD FMLA CKD-EPI: >60 ML/MIN/1.73/M2
GLOBULIN SER-MCNC: 3 GM/DL (ref 2.4–3.5)
GLUCOSE CSF-MCNC: 140 MG/DL (ref 40–70)
GLUCOSE SERPL-MCNC: 376 MG/DL (ref 74–100)
GP B STREP DNA CSF QL NAA+NON-PROBE: NOT DETECTED
HADV DNA NPH QL NAA+NON-PROBE: NOT DETECTED
HAEM INFLU DNA CSF QL NAA+NON-PROBE: NOT DETECTED
HAV IGM SERPL QL IA: NONREACTIVE
HBA1C MFR BLD: 10.1 %
HBV CORE IGM SERPL QL IA: NONREACTIVE
HBV SURFACE AG SERPL QL IA: NONREACTIVE
HCOV 229E RNA NPH QL NAA+NON-PROBE: NOT DETECTED
HCOV HKU1 RNA NPH QL NAA+NON-PROBE: NOT DETECTED
HCOV NL63 RNA NPH QL NAA+NON-PROBE: NOT DETECTED
HCOV OC43 RNA NPH QL NAA+NON-PROBE: NOT DETECTED
HCT VFR BLD AUTO: 36.1 % (ref 42–52)
HCV AB SERPL QL IA: NONREACTIVE
HGB BLD-MCNC: 12.3 G/DL (ref 14–18)
HHV6 DNA CSF QL NAA+NON-PROBE: NOT DETECTED
HIV 1+2 AB+HIV1 P24 AG SERPL QL IA: NONREACTIVE
HMPV RNA NPH QL NAA+NON-PROBE: NOT DETECTED
HPIV1 RNA NPH QL NAA+NON-PROBE: NOT DETECTED
HPIV2 RNA NPH QL NAA+NON-PROBE: NOT DETECTED
HPIV3 RNA NPH QL NAA+NON-PROBE: NOT DETECTED
HPIV4 RNA NPH QL NAA+NON-PROBE: NOT DETECTED
HSV1 DNA CSF QL NAA+NON-PROBE: NOT DETECTED
HSV2 DNA CSF QL NAA+NON-PROBE: NOT DETECTED
IMM GRANULOCYTES # BLD AUTO: 0.08 X10(3)/MCL (ref 0–0.04)
IMM GRANULOCYTES NFR BLD AUTO: 0.4 %
INR PPP: 1.1
L MONOCYTOG DNA CSF QL NAA+NON-PROBE: NOT DETECTED
LYMPHOCYTES # BLD AUTO: 2.21 X10(3)/MCL (ref 0.6–4.6)
LYMPHOCYTES NFR BLD AUTO: 11.4 %
M PNEUMO DNA NPH QL NAA+NON-PROBE: NOT DETECTED
MAGNESIUM SERPL-MCNC: 1.4 MG/DL (ref 1.6–2.6)
MCH RBC QN AUTO: 29.6 PG (ref 27–31)
MCHC RBC AUTO-ENTMCNC: 34.1 G/DL (ref 33–36)
MCV RBC AUTO: 87 FL (ref 80–94)
MONOCYTES # BLD AUTO: 1.77 X10(3)/MCL (ref 0.1–1.3)
MONOCYTES NFR BLD AUTO: 9.2 %
N MEN DNA CSF QL NAA+NON-PROBE: NOT DETECTED
NEUTROPHILS # BLD AUTO: 15.09 X10(3)/MCL (ref 2.1–9.2)
NEUTROPHILS NFR BLD AUTO: 78.2 %
NRBC BLD AUTO-RTO: 0 %
PARECHOVIRUS A RNA CSF QL NAA+NON-PROBE: NOT DETECTED
PHOSPHATE SERPL-MCNC: 2.2 MG/DL (ref 2.3–4.7)
PLATELET # BLD AUTO: 269 X10(3)/MCL (ref 130–400)
PMV BLD AUTO: 9.2 FL (ref 7.4–10.4)
POCT GLUCOSE: 144 MG/DL (ref 70–110)
POCT GLUCOSE: 202 MG/DL (ref 70–110)
POCT GLUCOSE: 302 MG/DL (ref 70–110)
POTASSIUM SERPL-SCNC: 4.3 MMOL/L (ref 3.5–5.1)
PROT CSF-MCNC: 42.2 MG/DL (ref 15–45)
PROT SERPL-MCNC: 6.1 GM/DL (ref 6.4–8.3)
PROTHROMBIN TIME: 14.3 SECONDS (ref 12.5–14.5)
RBC # BLD AUTO: 4.15 X10(6)/MCL (ref 4.7–6.1)
RBC # CSF MANUAL: 5 /UL
RSV RNA NPH QL NAA+NON-PROBE: NOT DETECTED
RV+EV RNA NPH QL NAA+NON-PROBE: NOT DETECTED
S PNEUM DNA CSF QL NAA+NON-PROBE: NOT DETECTED
SODIUM SERPL-SCNC: 131 MMOL/L (ref 136–145)
STREP A PCR (OHS): NOT DETECTED
T PALLIDUM AB SER QL: NONREACTIVE
VANCOMYCIN TROUGH SERPL-MCNC: 4.9 UG/ML (ref 15–20)
VZV DNA CSF QL NAA+NON-PROBE: NOT DETECTED
WBC # BLD AUTO: 19.31 X10(3)/MCL (ref 4.5–11.5)
WBC # CSF MANUAL: 0 /UL (ref 0–5)

## 2024-07-03 PROCEDURE — 36415 COLL VENOUS BLD VENIPUNCTURE: CPT | Performed by: INTERNAL MEDICINE

## 2024-07-03 PROCEDURE — 84100 ASSAY OF PHOSPHORUS: CPT | Performed by: INTERNAL MEDICINE

## 2024-07-03 PROCEDURE — 89051 BODY FLUID CELL COUNT: CPT | Performed by: INTERNAL MEDICINE

## 2024-07-03 PROCEDURE — 82945 GLUCOSE OTHER FLUID: CPT | Performed by: INTERNAL MEDICINE

## 2024-07-03 PROCEDURE — 85025 COMPLETE CBC W/AUTO DIFF WBC: CPT | Performed by: INTERNAL MEDICINE

## 2024-07-03 PROCEDURE — 87486 CHLMYD PNEUM DNA AMP PROBE: CPT | Performed by: INTERNAL MEDICINE

## 2024-07-03 PROCEDURE — 80053 COMPREHEN METABOLIC PANEL: CPT | Performed by: INTERNAL MEDICINE

## 2024-07-03 PROCEDURE — 63600175 PHARM REV CODE 636 W HCPCS: Performed by: INTERNAL MEDICINE

## 2024-07-03 PROCEDURE — 80074 ACUTE HEPATITIS PANEL: CPT | Performed by: INTERNAL MEDICINE

## 2024-07-03 PROCEDURE — 85610 PROTHROMBIN TIME: CPT | Performed by: INTERNAL MEDICINE

## 2024-07-03 PROCEDURE — 87798 DETECT AGENT NOS DNA AMP: CPT | Performed by: INTERNAL MEDICINE

## 2024-07-03 PROCEDURE — 87581 M.PNEUMON DNA AMP PROBE: CPT | Performed by: INTERNAL MEDICINE

## 2024-07-03 PROCEDURE — 009U3ZX DRAINAGE OF SPINAL CANAL, PERCUTANEOUS APPROACH, DIAGNOSTIC: ICD-10-PCS | Performed by: RADIOLOGY

## 2024-07-03 PROCEDURE — 87389 HIV-1 AG W/HIV-1&-2 AB AG IA: CPT | Performed by: INTERNAL MEDICINE

## 2024-07-03 PROCEDURE — 25000003 PHARM REV CODE 250: Performed by: INTERNAL MEDICINE

## 2024-07-03 PROCEDURE — 87899 AGENT NOS ASSAY W/OPTIC: CPT | Performed by: INTERNAL MEDICINE

## 2024-07-03 PROCEDURE — 85730 THROMBOPLASTIN TIME PARTIAL: CPT | Performed by: INTERNAL MEDICINE

## 2024-07-03 PROCEDURE — 84157 ASSAY OF PROTEIN OTHER: CPT | Performed by: INTERNAL MEDICINE

## 2024-07-03 PROCEDURE — 83735 ASSAY OF MAGNESIUM: CPT | Performed by: INTERNAL MEDICINE

## 2024-07-03 PROCEDURE — 80202 ASSAY OF VANCOMYCIN: CPT | Performed by: INTERNAL MEDICINE

## 2024-07-03 PROCEDURE — 86592 SYPHILIS TEST NON-TREP QUAL: CPT | Performed by: INTERNAL MEDICINE

## 2024-07-03 PROCEDURE — 86780 TREPONEMA PALLIDUM: CPT | Performed by: INTERNAL MEDICINE

## 2024-07-03 PROCEDURE — 87651 STREP A DNA AMP PROBE: CPT | Performed by: INTERNAL MEDICINE

## 2024-07-03 PROCEDURE — 11000001 HC ACUTE MED/SURG PRIVATE ROOM

## 2024-07-03 PROCEDURE — 87483 CNS DNA AMP PROBE TYPE 12-25: CPT | Performed by: INTERNAL MEDICINE

## 2024-07-03 PROCEDURE — 83036 HEMOGLOBIN GLYCOSYLATED A1C: CPT | Performed by: INTERNAL MEDICINE

## 2024-07-03 PROCEDURE — 25000003 PHARM REV CODE 250: Performed by: FAMILY MEDICINE

## 2024-07-03 PROCEDURE — 87070 CULTURE OTHR SPECIMN AEROBIC: CPT | Performed by: INTERNAL MEDICINE

## 2024-07-03 RX ORDER — ACETAMINOPHEN 325 MG/1
650 TABLET ORAL EVERY 4 HOURS PRN
Status: DISCONTINUED | OUTPATIENT
Start: 2024-07-03 | End: 2024-07-05 | Stop reason: HOSPADM

## 2024-07-03 RX ORDER — BUTALBITAL, ACETAMINOPHEN AND CAFFEINE 50; 325; 40 MG/1; MG/1; MG/1
1 TABLET ORAL EVERY 4 HOURS PRN
Status: DISCONTINUED | OUTPATIENT
Start: 2024-07-03 | End: 2024-07-05 | Stop reason: HOSPADM

## 2024-07-03 RX ORDER — ALUMINUM HYDROXIDE, MAGNESIUM HYDROXIDE, AND SIMETHICONE 1200; 120; 1200 MG/30ML; MG/30ML; MG/30ML
30 SUSPENSION ORAL 4 TIMES DAILY PRN
Status: DISCONTINUED | OUTPATIENT
Start: 2024-07-03 | End: 2024-07-05 | Stop reason: HOSPADM

## 2024-07-03 RX ORDER — LISINOPRIL 20 MG/1
20 TABLET ORAL DAILY
Status: DISCONTINUED | OUTPATIENT
Start: 2024-07-03 | End: 2024-07-05 | Stop reason: HOSPADM

## 2024-07-03 RX ORDER — ENOXAPARIN SODIUM 100 MG/ML
40 INJECTION SUBCUTANEOUS EVERY 24 HOURS
Status: DISCONTINUED | OUTPATIENT
Start: 2024-07-03 | End: 2024-07-05 | Stop reason: HOSPADM

## 2024-07-03 RX ORDER — IBUPROFEN 200 MG
16 TABLET ORAL
Status: DISCONTINUED | OUTPATIENT
Start: 2024-07-03 | End: 2024-07-05 | Stop reason: HOSPADM

## 2024-07-03 RX ORDER — IBUPROFEN 200 MG
24 TABLET ORAL
Status: DISCONTINUED | OUTPATIENT
Start: 2024-07-03 | End: 2024-07-05 | Stop reason: HOSPADM

## 2024-07-03 RX ORDER — GUAIFENESIN 600 MG/1
600 TABLET, EXTENDED RELEASE ORAL 2 TIMES DAILY
Status: DISCONTINUED | OUTPATIENT
Start: 2024-07-03 | End: 2024-07-05

## 2024-07-03 RX ORDER — VALACYCLOVIR HYDROCHLORIDE 500 MG/1
1000 TABLET, FILM COATED ORAL 3 TIMES DAILY
Status: DISCONTINUED | OUTPATIENT
Start: 2024-07-03 | End: 2024-07-05

## 2024-07-03 RX ORDER — INSULIN ASPART 100 [IU]/ML
8 INJECTION, SOLUTION INTRAVENOUS; SUBCUTANEOUS
Status: DISCONTINUED | OUTPATIENT
Start: 2024-07-03 | End: 2024-07-05 | Stop reason: HOSPADM

## 2024-07-03 RX ORDER — ONDANSETRON HYDROCHLORIDE 2 MG/ML
4 INJECTION, SOLUTION INTRAVENOUS EVERY 4 HOURS PRN
Status: DISCONTINUED | OUTPATIENT
Start: 2024-07-03 | End: 2024-07-05 | Stop reason: HOSPADM

## 2024-07-03 RX ORDER — MUPIROCIN 20 MG/G
OINTMENT TOPICAL 2 TIMES DAILY
Status: DISCONTINUED | OUTPATIENT
Start: 2024-07-03 | End: 2024-07-05 | Stop reason: HOSPADM

## 2024-07-03 RX ORDER — ATORVASTATIN CALCIUM 10 MG/1
10 TABLET, FILM COATED ORAL DAILY
Status: DISCONTINUED | OUTPATIENT
Start: 2024-07-03 | End: 2024-07-03

## 2024-07-03 RX ORDER — DAPAGLIFLOZIN 10 MG/1
10 TABLET, FILM COATED ORAL DAILY
Status: DISCONTINUED | OUTPATIENT
Start: 2024-07-03 | End: 2024-07-05 | Stop reason: HOSPADM

## 2024-07-03 RX ORDER — SODIUM CHLORIDE 9 MG/ML
1000 INJECTION, SOLUTION INTRAVENOUS
Status: COMPLETED | OUTPATIENT
Start: 2024-07-03 | End: 2024-07-03

## 2024-07-03 RX ORDER — INSULIN GLARGINE 100 [IU]/ML
20 INJECTION, SOLUTION SUBCUTANEOUS NIGHTLY
Status: DISCONTINUED | OUTPATIENT
Start: 2024-07-03 | End: 2024-07-05 | Stop reason: HOSPADM

## 2024-07-03 RX ORDER — INSULIN ASPART 100 [IU]/ML
1-10 INJECTION, SOLUTION INTRAVENOUS; SUBCUTANEOUS
Status: DISCONTINUED | OUTPATIENT
Start: 2024-07-03 | End: 2024-07-05 | Stop reason: HOSPADM

## 2024-07-03 RX ORDER — METFORMIN HYDROCHLORIDE 500 MG/1
1000 TABLET ORAL
Status: DISCONTINUED | OUTPATIENT
Start: 2024-07-04 | End: 2024-07-05 | Stop reason: HOSPADM

## 2024-07-03 RX ORDER — AMOXICILLIN 250 MG
2 CAPSULE ORAL 2 TIMES DAILY PRN
Status: DISCONTINUED | OUTPATIENT
Start: 2024-07-03 | End: 2024-07-05 | Stop reason: HOSPADM

## 2024-07-03 RX ORDER — GLUCAGON 1 MG
1 KIT INJECTION
Status: DISCONTINUED | OUTPATIENT
Start: 2024-07-03 | End: 2024-07-05 | Stop reason: HOSPADM

## 2024-07-03 RX ORDER — SODIUM CHLORIDE 0.9 % (FLUSH) 0.9 %
10 SYRINGE (ML) INJECTION
Status: DISCONTINUED | OUTPATIENT
Start: 2024-07-03 | End: 2024-07-05 | Stop reason: HOSPADM

## 2024-07-03 RX ORDER — SODIUM CHLORIDE, SODIUM LACTATE, POTASSIUM CHLORIDE, CALCIUM CHLORIDE 600; 310; 30; 20 MG/100ML; MG/100ML; MG/100ML; MG/100ML
INJECTION, SOLUTION INTRAVENOUS CONTINUOUS
Status: DISCONTINUED | OUTPATIENT
Start: 2024-07-03 | End: 2024-07-05 | Stop reason: HOSPADM

## 2024-07-03 RX ORDER — ATORVASTATIN CALCIUM 10 MG/1
10 TABLET, FILM COATED ORAL NIGHTLY
Status: DISCONTINUED | OUTPATIENT
Start: 2024-07-03 | End: 2024-07-05 | Stop reason: HOSPADM

## 2024-07-03 RX ORDER — PROCHLORPERAZINE EDISYLATE 5 MG/ML
5 INJECTION INTRAMUSCULAR; INTRAVENOUS EVERY 6 HOURS PRN
Status: DISCONTINUED | OUTPATIENT
Start: 2024-07-03 | End: 2024-07-05 | Stop reason: HOSPADM

## 2024-07-03 RX ORDER — CITALOPRAM 10 MG/1
10 TABLET ORAL DAILY
Status: DISCONTINUED | OUTPATIENT
Start: 2024-07-03 | End: 2024-07-05 | Stop reason: HOSPADM

## 2024-07-03 RX ORDER — TALC
6 POWDER (GRAM) TOPICAL NIGHTLY PRN
Status: DISCONTINUED | OUTPATIENT
Start: 2024-07-03 | End: 2024-07-05 | Stop reason: HOSPADM

## 2024-07-03 RX ORDER — CLONAZEPAM 1 MG/1
1 TABLET ORAL DAILY
Status: DISCONTINUED | OUTPATIENT
Start: 2024-07-03 | End: 2024-07-05 | Stop reason: HOSPADM

## 2024-07-03 RX ORDER — ACETAMINOPHEN 10 MG/ML
1000 INJECTION, SOLUTION INTRAVENOUS ONCE
Status: COMPLETED | OUTPATIENT
Start: 2024-07-03 | End: 2024-07-03

## 2024-07-03 RX ORDER — POLYETHYLENE GLYCOL 3350 17 G/17G
17 POWDER, FOR SOLUTION ORAL 2 TIMES DAILY PRN
Status: DISCONTINUED | OUTPATIENT
Start: 2024-07-03 | End: 2024-07-05 | Stop reason: HOSPADM

## 2024-07-03 RX ORDER — MONTELUKAST SODIUM 5 MG/1
5 TABLET, CHEWABLE ORAL DAILY
Status: DISCONTINUED | OUTPATIENT
Start: 2024-07-03 | End: 2024-07-05 | Stop reason: HOSPADM

## 2024-07-03 RX ADMIN — INSULIN ASPART 8 UNITS: 100 INJECTION, SOLUTION INTRAVENOUS; SUBCUTANEOUS at 11:07

## 2024-07-03 RX ADMIN — INSULIN ASPART 8 UNITS: 100 INJECTION, SOLUTION INTRAVENOUS; SUBCUTANEOUS at 05:07

## 2024-07-03 RX ADMIN — SODIUM CHLORIDE 300 ML: 9 INJECTION, SOLUTION INTRAVENOUS at 12:07

## 2024-07-03 RX ADMIN — ENOXAPARIN SODIUM 40 MG: 40 INJECTION SUBCUTANEOUS at 04:07

## 2024-07-03 RX ADMIN — ATORVASTATIN CALCIUM 10 MG: 10 TABLET, FILM COATED ORAL at 09:07

## 2024-07-03 RX ADMIN — ACETAMINOPHEN 1000 MG: 10 INJECTION, SOLUTION INTRAVENOUS at 03:07

## 2024-07-03 RX ADMIN — DAPAGLIFLOZIN 10 MG: 10 TABLET, FILM COATED ORAL at 04:07

## 2024-07-03 RX ADMIN — SODIUM CHLORIDE, POTASSIUM CHLORIDE, SODIUM LACTATE AND CALCIUM CHLORIDE: 600; 310; 30; 20 INJECTION, SOLUTION INTRAVENOUS at 08:07

## 2024-07-03 RX ADMIN — CEFTRIAXONE SODIUM 2 G: 2 INJECTION, POWDER, FOR SOLUTION INTRAMUSCULAR; INTRAVENOUS at 08:07

## 2024-07-03 RX ADMIN — GUAIFENESIN 600 MG: 600 TABLET, EXTENDED RELEASE ORAL at 04:07

## 2024-07-03 RX ADMIN — MUPIROCIN: 20 OINTMENT TOPICAL at 08:07

## 2024-07-03 RX ADMIN — LISINOPRIL 20 MG: 20 TABLET ORAL at 01:07

## 2024-07-03 RX ADMIN — INSULIN GLARGINE 20 UNITS: 100 INJECTION, SOLUTION SUBCUTANEOUS at 09:07

## 2024-07-03 RX ADMIN — BUTALBITAL, ACETAMINOPHEN, AND CAFFEINE 1 TABLET: 325; 50; 40 TABLET ORAL at 10:07

## 2024-07-03 RX ADMIN — VALACYCLOVIR 1000 MG: 500 TABLET, FILM COATED ORAL at 04:07

## 2024-07-03 RX ADMIN — SODIUM CHLORIDE, POTASSIUM CHLORIDE, SODIUM LACTATE AND CALCIUM CHLORIDE 1000 ML: 600; 310; 30; 20 INJECTION, SOLUTION INTRAVENOUS at 03:07

## 2024-07-03 RX ADMIN — INSULIN ASPART 10 UNITS: 100 INJECTION, SOLUTION INTRAVENOUS; SUBCUTANEOUS at 06:07

## 2024-07-03 RX ADMIN — SODIUM CHLORIDE, POTASSIUM CHLORIDE, SODIUM LACTATE AND CALCIUM CHLORIDE: 600; 310; 30; 20 INJECTION, SOLUTION INTRAVENOUS at 03:07

## 2024-07-03 RX ADMIN — CLONAZEPAM 1 MG: 1 TABLET ORAL at 01:07

## 2024-07-03 RX ADMIN — CITALOPRAM HYDROBROMIDE 10 MG: 10 TABLET ORAL at 01:07

## 2024-07-03 RX ADMIN — VALACYCLOVIR 1000 MG: 500 TABLET, FILM COATED ORAL at 08:07

## 2024-07-03 RX ADMIN — VALACYCLOVIR 1000 MG: 500 TABLET, FILM COATED ORAL at 09:07

## 2024-07-03 RX ADMIN — GUAIFENESIN 600 MG: 600 TABLET, EXTENDED RELEASE ORAL at 09:07

## 2024-07-03 RX ADMIN — VANCOMYCIN HYDROCHLORIDE 2000 MG: 500 INJECTION, POWDER, LYOPHILIZED, FOR SOLUTION INTRAVENOUS at 10:07

## 2024-07-03 RX ADMIN — VANCOMYCIN HYDROCHLORIDE 2000 MG: 500 INJECTION, POWDER, LYOPHILIZED, FOR SOLUTION INTRAVENOUS at 04:07

## 2024-07-03 RX ADMIN — MONTELUKAST SODIUM 5 MG: 5 TABLET, CHEWABLE ORAL at 01:07

## 2024-07-03 NOTE — ED NOTES
Attempted to do a lumbar puncture at 21:00, the doctor was able to get fluid so he spoke with anesthesiologist and he attempted to do another lumbar puncture at 22:15 but it was unsuccessful.

## 2024-07-03 NOTE — PROGRESS NOTES
"Pharmacokinetic Initial Assessment: IV Vancomycin    Assessment/Plan:    First dose was started at [facility] but only 1gm appears have been given.  Initiate intravenous vancomycin with loading dose of 2000 mg once followed by a maintenance dose of vancomycin 2000 mg IV every 12 hours  Desired empiric serum trough concentration is 15 to 20 mcg/mL  Draw vancomycin trough level 60 min prior to fourth dose on 07/04 at approximately 0400  Pharmacy will continue to follow and monitor vancomycin.      Please contact pharmacy at extension [phone] with any questions regarding this assessment.     Thank you for the consult,   Hayden Hoffman       Patient brief summary:  Michael Malone is a 28 y.o. male initiated on antimicrobial therapy with IV Vancomycin for treatment of suspected meningitis    Drug Allergies:   Review of patient's allergies indicates:   Allergen Reactions    Codeine        Actual Body Weight:   122.5kg    Renal Function:   Estimated Creatinine Clearance: 251.2 mL/min (A) (based on SCr of 0.6 mg/dL (L)).,     Dialysis Method (if applicable):  N/A    CBC (last 72 hours):  Recent Labs   Lab Result Units 07/02/24  1744   WBC x10(3)/mcL 23.06*   Hgb g/dL 14.3   Hct % 40.9   Platelet x10(3)/mcL 293   Monocytes % % 12*       Metabolic Panel (last 72 hours):  Recent Labs   Lab Result Units 07/02/24  1855 07/02/24  1911   Sodium mmol/L 133*  --    Potassium mmol/L 4.6  --    Chloride mmol/L 96*  --    CO2 mmol/L 26  --    Glucose mg/dL 250*  --    Glucose, UA   --  >=1000*   Blood Urea Nitrogen mg/dL 11  --    Creatinine mg/dL 0.60*  --    Albumin g/dL 4.6  --    Bilirubin Total mg/dL 1.0  --    ALP unit/L 88  --    AST unit/L 49  --    ALT unit/L 29  --        Drug levels (last 3 results):  No results for input(s): "VANCOMYCINRA", "VANCORANDOM", "VANCOMYCINPE", "VANCOPEAK", "VANCOMYCINTR", "VANCOTROUGH" in the last 72 hours.    Microbiologic Results:  Microbiology Results (last 7 days)       Procedure " Component Value Units Date/Time    Cerebrospinal Fluid Culture [[id number]]     Order Status: Sent Specimen: CSF (Spinal Fluid) from Cerebrospinal Fluid     Cryptococcal antigen, CSF [[id number]]     Order Status: Sent Specimen: CSF (Spinal Fluid)

## 2024-07-03 NOTE — PLAN OF CARE
07/03/24 1537   Discharge Assessment   Assessment Type Discharge Planning Assessment   Confirmed/corrected address, phone number and insurance Yes   Confirmed Demographics Correct on Facesheet   Source of Information patient;family   Communicated ISAMAR with patient/caregiver Date not available/Unable to determine   Reason For Admission leukocytosis   People in Home parent(s)   Do you expect to return to your current living situation? Yes   Do you have help at home or someone to help you manage your care at home? Yes   Who are your caregiver(s) and their phone number(s)? mother Breanna Malone [phone]   Prior to hospitilization cognitive status: Alert/Oriented   Current cognitive status: Alert/Oriented   Walking or Climbing Stairs Difficulty no   Dressing/Bathing Difficulty no   Equipment Currently Used at Home glucometer   Patient currently being followed by outpatient case management? No   Do you currently have service(s) that help you manage your care at home? No   Do you take prescription medications? Yes   Do you have prescription coverage? Yes   Coverage aetna medicare   Do you have any problems affording any of your prescribed medications? No   Is the patient taking medications as prescribed? yes   Who is going to help you get home at discharge? mother   How do you get to doctors appointments? family or friend will provide   Are you on dialysis? No   Do you take coumadin? No   Discharge Plan A Home with family   Discharge Plan B Home with family   Discharge Plan discussed with: Parent(s);Patient   Transition of Care Barriers None   OTHER   Name(s) of People in Home mother Breanna

## 2024-07-03 NOTE — NURSING
Nurses Note -- 4 Eyes      7/3/2024   3:37 AM      Skin assessed during: Admit      [x] No Altered Skin Integrity Present    [x]Prevention Measures Documented      [] Yes- Altered Skin Integrity Present or Discovered   [] LDA Added if Not in Epic (Describe Wound)   [] New Altered Skin Integrity was Present on Admit and Documented in LDA   [] Wound Image Taken    Wound Care Consulted? No    Attending Nurse:  Kena Barone RN/Staff Member:   Nirali Portillo    Note: patient has puncture wounds from prior Lps today at another facility.

## 2024-07-03 NOTE — ANESTHESIA PREPROCEDURE EVALUATION
07/02/2024  Michael Malone is a 28 y.o., male.      Pre-op Assessment    I have reviewed the Patient Summary Reports.     I have reviewed the Nursing Notes. I have reviewed the NPO Status.   I have reviewed the Medications.     Review of Systems  Anesthesia Hx:  No problems with previous Anesthesia             Denies Family Hx of Anesthesia complications.    Denies Personal Hx of Anesthesia complications.                    Hematology/Oncology:  Hematology Normal   Oncology Normal                                   EENT/Dental:  EENT/Dental Normal           Cardiovascular:  Cardiovascular Normal Exercise tolerance: good                                           Pulmonary:  Pulmonary Normal          Asthma:               Renal/:  Renal/ Normal                 Hepatic/GI:  Hepatic/GI Normal                 Musculoskeletal:  Musculoskeletal Normal                Neurological:  Neurology Normal                                      Endocrine:  Endocrine Normal     Diabetes, Type 2 Diabetes                      Dermatological:  Skin Normal    Psych:  Psychiatric History anxiety                 Physical Exam  General: Well nourished, Cooperative, Alert and Oriented    Airway:  Mallampati: II / II  Mouth Opening: Normal  TM Distance: Normal  Tongue: Normal  Neck ROM: Normal ROM    Dental:  Intact        Anesthesia Plan  Type of Anesthesia, risks & benefits discussed:    Anesthesia Type: Spinal  Intra-op Monitoring Plan: Standard ASA Monitors  Post Op Pain Control Plan: multimodal analgesia  Induction:  IV  Airway Plan: Direct  Informed Consent: Informed consent signed with the Patient and all parties understand the risks and agree with anesthesia plan.  All questions answered. Patient consented to blood products? Yes  ASA Score: 3 Emergent  Day of Surgery Review of History & Physical: H&P Update referred to the  surgeon/provider.I have interviewed and examined the patient. I have reviewed the patient's H&P dated: There are no significant changes.     Ready For Surgery From Anesthesia Perspective.     .

## 2024-07-03 NOTE — PROGRESS NOTES
"Pharmacokinetic Assessment Follow Up: IV Vancomycin    Vancomycin serum concentration assessment(s):    Serum creatinine changed from 0.60 mg/dL to 0.96 mg/dL in 12 hours.        Vancomycin Regimen Plan:    Discontinue the scheduled vancomycin regimen and re-dose when the random level is less than 20 mcg/mL, next level to be drawn at 07/03 on 1500.    Drug levels (last 3 results):  No results for input(s): "VANCOMYCINRA", "VANCORANDOM", "VANCOMYCINPE", "VANCOPEAK", "VANCOMYCINTR", "VANCOTROUGH" in the last 72 hours.    Pharmacy will continue to follow and monitor vancomycin.    Please contact pharmacy at extension [phone] for questions regarding this assessment.    Thank you for the consult,   Mj Lott       Patient brief summary:  Michael Malone is a 28 y.o. male initiated on antimicrobial therapy with IV Vancomycin for treatment of meningitis    The patient's current regimen is pulse dose.    Drug Allergies:   Review of patient's allergies indicates:   Allergen Reactions    Codeine        Actual Body Weight:   122.5kg    Renal Function:   Estimated Creatinine Clearance: 157 mL/min (based on SCr of 0.96 mg/dL).,     Dialysis Method (if applicable):  N/A    CBC (last 72 hours):  Recent Labs   Lab Result Units 07/02/24  1744 07/03/24  0449   WBC x10(3)/mcL 23.06* 19.31*   Hgb g/dL 14.3 12.3*   Hemoglobin A1c %  --  10.1*   Hct % 40.9 36.1*   Platelet x10(3)/mcL 293 269   Mono % %  --  9.2   Monocytes % % 12*  --    Eos % %  --  0.5   Basophil % %  --  0.3       Metabolic Panel (last 72 hours):  Recent Labs   Lab Result Units 07/02/24  1855 07/02/24  1911 07/03/24  0450   Sodium mmol/L 133*  --  131*   Potassium mmol/L 4.6  --  4.3   Chloride mmol/L 96*  --  100   CO2 mmol/L 26  --  22   Glucose mg/dL 250*  --  376*   Glucose, UA   --  >=1000*  --    Blood Urea Nitrogen mg/dL 11  --  12.6   Creatinine mg/dL 0.60*  --  0.96   Albumin g/dL 4.6  --  3.1*   Bilirubin Total mg/dL 1.0  --  0.7   ALP unit/L 88  --  88 "   AST unit/L 49  --  16   ALT unit/L 29  --  16   Magnesium Level mg/dL  --   --  1.40*   Phosphorus Level mg/dL  --   --  2.2*       Vancomycin Administrations:  vancomycin given in the last 96 hours                     vancomycin 2 g in dextrose 5 % 500 mL IVPB (mg) 2,000 mg New Bag 07/03/24 0446    vancomycin (VANCOCIN) 1,000 mg in D5W 250 mL IVPB (Vial-Mate) (mg) 1,000 mg New Bag 07/02/24 2020                    Microbiologic Results:  Microbiology Results (last 7 days)       Procedure Component Value Units Date/Time    Cerebrospinal Fluid Culture [[id number]]     Order Status: Sent Specimen: CSF (Spinal Fluid) from Cerebrospinal Fluid     Cryptococcal antigen, CSF [[id number]]     Order Status: Sent Specimen: CSF (Spinal Fluid)

## 2024-07-03 NOTE — H&P
[facility]  Hospital Medicine History & Physical Examination       Patient Name: Michael Malone  MRN: [id number]  Patient Class: IP- Inpatient   Admission Date: 7/3/2024   Admitting Physician: Wally Arellano MD   Length of Stay: 0  Attending Physician: Justo Concepcion MD  Primary Care Provider: Maribell Witt FNP  Face-to-Face encounter date: 07/03/2024  Code Status: Full Code   Chief Complaint: Fever, headache      Patient information was obtained from patient, patient's family, past medical records and ER records.     HISTORY OF PRESENT ILLNESS:   Michael Malone is a 28 y.o. Black or  male with a past medical history of hypertension, hyperlipidemia, diabetes mellitus type 2, anxiety/depression, autism. The patient presented to [facility] on 7/3/2024 as a transfer from [facility].  Patient presented to outside facility on 07/02/2024 with fever, headache, fatigue, body aches, cough, sore throat, nasal congestion, nausea and vomiting x1.  Workup revealed WBC of 23.06, sodium 133, chloride 96, glucose 250.  Influenza a/B, strep, SARS-COV-2 PCR negative.  UA negative for infection.  Chest x-ray without acute abnormality.  Lumbar puncture was attempted by ED physician but no fluid was obtained.  Anesthesia was therefore consulted for lumbar puncture which was also unsuccessful.  Patient was received IV fluid hydration, Zofran, morphine, Toradol, Dilaudid and Rocephin and vancomycin prior to transfer. Upon presentation [facility] temperature 100.6° F, heart rate 123, blood pressure 109/73 and SpO2 97% on room air.  On exam patient reports having subjective fevers and a headache located to the top of his head since 07/01/2024.  He continues to experienced sore throat and nasal congestion.  He denies complaints of abdominal pain, nausea, diarrhea, chest pain, shortness of breath, recent dorm stay/custodial time and history of meningitis.  Mother at bedside reports patient has  been having forgetfulness progressively worsening since 2023.  Patient is admitted to hospital medicine services for further medical management.    PAST MEDICAL HISTORY:     Past Medical History:   Diagnosis Date    Anxiety     Autism     Diabetes mellitus     Diabetes mellitus, type 2     Hyperlipidemia        PAST SURGICAL HISTORY:     Past Surgical History:   Procedure Laterality Date    ADENOIDECTOMY      TONSILLECTOMY      WRIST SURGERY         ALLERGIES:   Codeine    FAMILY HISTORY:   Mother: Breast cancer   Father: , cardiovascular disease and diabetes mellitus     SOCIAL HISTORY:   Drinks wine occasionally   Denies tobacco and illicit drug use    Screening for Social Drivers for health:  Patient screened for food insecurity, housing instability, transportation needs, utility difficulties, and interpersonal safety (select all that apply as identified as concern)  []Housing or Food  []Transportation Needs  []Utility Difficulties  []Interpersonal safety  [x]None    HOME MEDICATIONS:     Prior to Admission medications    Medication Sig Start Date End Date Taking? Authorizing Provider   atorvastatin (LIPITOR) 10 MG tablet Take 10 mg by mouth once daily. 23  Yes Provider, Historical   citalopram (CELEXA) 10 MG tablet Take 1 tablet (10 mg total) by mouth once daily. 3/12/24  Yes Maribell Witt FNP   clonazePAM (KLONOPIN) 1 MG tablet Take 1 mg by mouth once daily. 23  Yes Provider, Historical   donepeziL (ARICEPT) 10 MG tablet Take 10 mg by mouth once daily.   Yes Provider, Historical   FARXIGA 10 mg tablet Take 1 tablet (10 mg total) by mouth Daily. 24  Yes Maribell Witt FNP   lisinopriL (PRINIVIL,ZESTRIL) 20 MG tablet Take 20 mg by mouth once daily. 23  Yes Provider, Historical   metFORMIN (GLUCOPHAGE) 1000 MG tablet Take 1,000 mg by mouth daily with breakfast. 23  Yes Provider, Historical   montelukast (SINGULAIR) 5 MG chewable tablet Take 1 tablet (5 mg total) by  mouth once daily. 4/29/24  Yes Maribell Witt FNP   SAXagliptin (ONGLYZA) 5 mg Tab tablet Take 5 mg by mouth once daily. 11/9/23  Yes Provider, Historical   traZODone (DESYREL) 150 MG tablet Take 75 mg by mouth nightly as needed for Insomnia or Depression. 12/29/23  Yes Provider, Historical   VASCEPA 1 gram Cap Take 2 capsules (2,000 mg total) by mouth every evening. 3/25/24  Yes Maribell Witt FNP   fluticasone propionate (FLONASE) 50 mcg/actuation nasal spray 2 sprays (100 mcg total) by Each Nostril route once daily. 3/13/24   Maribell Witt FNP   pioglitazone (ACTOS) 30 MG tablet Take 30 mg by mouth once daily. 11/9/23   Provider, Historical   TOUJEO SOLOSTAR U-300 INSULIN 300 unit/mL (1.5 mL) InPn pen Inject 60 Units into the skin once daily. 11/9/23   Provider, Historical   TRULICITY 4.5 mg/0.5 mL pen injector Inject 4.5 mg into the skin every 7 days. 11/9/23   Provider, Historical   ARIPiprazole (ABILIFY) 5 MG Tab Take 5 mg by mouth once daily. 12/29/23 7/3/24  Provider, Historical       REVIEW OF SYSTEMS:   Except as documented, all other systems reviewed and negative     PHYSICAL EXAM:     VITAL SIGNS: 24 HRS MIN & MAX LAST   Temp  Min: 97.8 °F (36.6 °C)  Max: 100.6 °F (38.1 °C) 99 °F (37.2 °C)   BP  Min: 84/54  Max: 143/78 115/81   Pulse  Min: 56  Max: 123  87   Resp  Min: 16  Max: 20 20   SpO2  Min: 96 %  Max: 100 % 98 %       General appearance: Well-developed, well-nourished male in no apparent distress.  Mother at bedside.  HEENT: Atraumatic head. Moist mucous membranes of oral cavity.  Nasal congestion.  No facial rigidity.  Lungs: Clear to auscultation to left lung fields.  Wheezing to right lung fields.  On room air..   Heart: Regular rate and rhythm.   Abdomen: Soft, non-distended.  Extremities: No cyanosis, clubbing. No deformities.  Skin: No Rash. Warm and dry.  Neuro: Awake, alert and oriented. Motor and sensory exams grossly intact.  Patient lifts bilateral lower extremity off of bed  but reports decreased strength due to lumbar back pain.  4/5 strength to all extremities.  Psych/mental status: Appropriate mood and affect. Cooperative. Responds appropriately to questions.       LABS AND IMAGING:     Recent Labs   Lab 07/02/24  1744 07/03/24  0449   WBC 23.06* 19.31*   RBC 4.83 4.15*   HGB 14.3 12.3*   HCT 40.9 36.1*   MCV 84.7 87.0   MCH 29.6 29.6   MCHC 35.0 34.1   RDW 13.0 13.3    269   MPV 8.7* 9.2       Recent Labs   Lab 07/02/24  1855 07/03/24  0450   * 131*   K 4.6 4.3   CL 96* 100   CO2 26 22   BUN 11 12.6   CREATININE 0.60* 0.96   CALCIUM 9.7 8.4   MG  --  1.40*   ALBUMIN 4.6 3.1*   ALKPHOS 88 88   ALT 29 16   AST 49 16   BILITOT 1.0 0.7       Microbiology Results (last 7 days)       Procedure Component Value Units Date/Time    Cerebrospinal Fluid Culture [[id number]]     Order Status: Sent Specimen: CSF (Spinal Fluid) from Cerebrospinal Fluid     Cryptococcal antigen, CSF [[id number]]     Order Status: Sent Specimen: CSF (Spinal Fluid)              X-Ray Chest 1 View  Narrative: EXAMINATION:  XR CHEST 1 VIEW    CLINICAL HISTORY:  Cough, unspecified    TECHNIQUE:  Single frontal view of the chest was performed.    COMPARISON:  December 26, 2021    FINDINGS:  The lungs are clear, with normal appearance of pulmonary vasculature and no pleural effusion or pneumothorax.    The cardiac silhouette is normal in size. The hilar and mediastinal contours are unremarkable.    Bones are intact.  Impression: No acute abnormality.    Electronically signed by: Randall Harper  Date:    07/02/2024  Time:    17:50        ASSESSMENT & PLAN:   Assessment:  Sepsis, etiology from tonsillitis r/o encephalitis   Hyponatremia/hypochloremia   Diabetes mellitus type 2 with hyperglycemia, uncontrolled as A1c 10  History of hypertension, hyperlipidemia, diabetes mellitus type 2, depression/anxiety and autism    Plan:  - Continue with Rocephin and vancomycin.  Will add on valacyclovir for concerns of  meningitis  - Follow results of blood cultures   - Lumbar puncture ordered.  Follow results  - Consider Neurology consult  - Tylenol as needed for fever   - Respiratory panel negative   - Hepatitis panel and HIV ordered.  Follow results  - Accu-Cheks and sliding scale  - Hemoglobin A1c 10.1  - Resume appropriate home medications when deemed necessary   - Labs in AM      VTE Prophylaxis: will be placed on SCD for DVT prophylaxis and will be advised to be as mobile as possible and sit in a chair as tolerated      __________________________________________________________________________  INPATIENT LIST OF MEDICATIONS     Current Facility-Administered Medications:     acetaminophen tablet 650 mg, 650 mg, Oral, Q4H PRN, Wally Arellano MD    aluminum-magnesium hydroxide-simethicone 200-200-20 mg/5 mL suspension 30 mL, 30 mL, Oral, QID PRN, Wally Arellano MD    butalbital-acetaminophen-caffeine -40 mg per tablet 1 tablet, 1 tablet, Oral, Q4H PRN, Wally Arellano MD    cefTRIAXone (ROCEPHIN) 2 g in D5W 100 mL IVPB (MB+), 2 g, Intravenous, Q12H, Wally Arellano MD, Last Rate: 200 mL/hr at 07/03/24 0829, 2 g at 07/03/24 0829    dextrose 10% bolus 125 mL 125 mL, 12.5 g, Intravenous, PRN, Wally Arellano MD    dextrose 10% bolus 250 mL 250 mL, 25 g, Intravenous, PRN, Wally Arellano MD    glucagon (human recombinant) injection 1 mg, 1 mg, Intramuscular, PRN, Wally Arellano MD    glucose chewable tablet 16 g, 16 g, Oral, PRN, Wally Arellano MD    glucose chewable tablet 24 g, 24 g, Oral, PRN, Wally Arellano MD    guaiFENesin 12 hr tablet 600 mg, 600 mg, Oral, BID, Wally Arellano MD, 600 mg at 07/03/24 0446    insulin aspart U-100 injection 1-10 Units, 1-10 Units, Subcutaneous, QID (AC + HS) PRN, Wally Arellano MD, 10 Units at 07/03/24 0646    lactated ringers infusion, , Intravenous, Continuous, Wally Arellano MD, Last Rate: 75 mL/hr at 07/03/24 0830, New Bag at 07/03/24 0830    melatonin tablet 6 mg, 6 mg, Oral, Nightly PRN, Adan, Ali  MD YADIEL    mupirocin 2 % ointment, , Nasal, BID, Wally Arellano MD, Given at 07/03/24 0824    ondansetron injection 4 mg, 4 mg, Intravenous, Q4H PRN, Wally Arellano MD    polyethylene glycol packet 17 g, 17 g, Oral, BID PRN, Wally Arellano MD    prochlorperazine injection Soln 5 mg, 5 mg, Intravenous, Q6H PRN, Wally Arellano MD    senna-docusate 8.6-50 mg per tablet 2 tablet, 2 tablet, Oral, BID PRN, Wally Arellano MD    sodium chloride 0.9% flush 10 mL, 10 mL, Intravenous, PRN, Wally Arellano MD    valACYclovir tablet 1,000 mg, 1,000 mg, Oral, TID, Wally Arellano MD, 1,000 mg at 07/03/24 0824    Pharmacy to dose Vancomycin consult, , , Once **AND** vancomycin - pharmacy to dose, , Intravenous, pharmacy to manage frequency, Wally Arellano MD      Scheduled Meds:   cefTRIAXone (Rocephin) IV (PEDS and ADULTS)  2 g Intravenous Q12H    guaiFENesin  600 mg Oral BID    mupirocin   Nasal BID    valACYclovir  1,000 mg Oral TID     Continuous Infusions:   lactated ringers   Intravenous Continuous 75 mL/hr at 07/03/24 0830 New Bag at 07/03/24 0830     PRN Meds:.  Current Facility-Administered Medications:     acetaminophen, 650 mg, Oral, Q4H PRN    aluminum-magnesium hydroxide-simethicone, 30 mL, Oral, QID PRN    butalbital-acetaminophen-caffeine -40 mg, 1 tablet, Oral, Q4H PRN    dextrose 10%, 12.5 g, Intravenous, PRN    dextrose 10%, 25 g, Intravenous, PRN    glucagon (human recombinant), 1 mg, Intramuscular, PRN    glucose, 16 g, Oral, PRN    glucose, 24 g, Oral, PRN    insulin aspart U-100, 1-10 Units, Subcutaneous, QID (AC + HS) PRN    melatonin, 6 mg, Oral, Nightly PRN    ondansetron, 4 mg, Intravenous, Q4H PRN    polyethylene glycol, 17 g, Oral, BID PRN    prochlorperazine, 5 mg, Intravenous, Q6H PRN    senna-docusate 8.6-50 mg, 2 tablet, Oral, BID PRN    sodium chloride 0.9%, 10 mL, Intravenous, PRN    Pharmacy to dose Vancomycin consult, , , Once **AND** vancomycin - pharmacy to dose, , Intravenous, pharmacy to manage  frequency      Discharge Planning and Disposition: Anticipated discharge to be determined.    I, DEUCE Garner, have reviewed and discussed the case with Dr. Justo Concepcion MD    Please see the following addendum for further assessment and plan from there attending MD.      Portion of this note is dictated using EMR integrated voice recognition software and may be subjected to voice recognition errors not corrected with proofreading. Please  for clarification if needed.       Miriam Tamez PA-C  07/03/2024      I Dr AURA Concepcion assumed care of this patient today at 11:30 am     For this patient encounter I performed the substantive portion of the visit. I reviewed the NP/PA/Resident documentation, treatment plan and medical decision making; and I had face-face time with this patient   A. History  Patient came in with c/o sore throat, headache, nasal congestion, body aches past 3 days   His mother has been sick with bronchitis   He denies any SOB, cough, chest pain  No photophobia, neck stiffness   He is oriented x 3 and following all verbal commands     B.Physical exam   Heart RRR  Lungs clear   Abdomen soft and non tender   Neuro: No FND    No neck stiffness   + mario submandibular tenderness on palpation. Oral cavity + mario swollen tonsils  Obese     C. Medical decision making    Patients labs and vitals reviewed  Clinical signs/symptoms consisted with upper respiratory infection/Tonsillitis   Will continue iv rocephin   Check throat swab for strep  In the ED he was suspected to have meningitis. He has no meningeal signs.   LP to be done today. Will f/u on CSF analysis   Continue supportive care     Blood sugars elevated, will add lantus 20 units and aspart 8 units TID   Accu check s q ac and hs     Home meds reviewed and started     Labs in am     Lovenox for DVT prophylaxis

## 2024-07-03 NOTE — PLAN OF CARE
07/03/24 1456   Medicare Message   Important Message from Medicare regarding Discharge Appeal Rights Given to patient/caregiver;Explained to patient/caregiver

## 2024-07-03 NOTE — ED NOTES
Patient stated was coughing, having chest pain and his body was aching all over. He stated this started since last night so he came tot he ER today.

## 2024-07-03 NOTE — ANESTHESIA PROCEDURE NOTES
Spinal    Diagnosis: mennigitis  Patient location during procedure: OR  Start time: 7/2/2024 10:00 PM  Timeout: 7/2/2024 10:00 PM  End time: 7/2/2024 10:40 PM    Staffing  Authorizing Provider: Efra Khalil CRNA  Performing Provider: Efra Khalil CRNA    Staffing  Performed by: Efra Khalil CRNA  Authorized by: Efra Khalil CRNA    Preanesthetic Checklist  Completed: patient identified, IV checked, site marked, risks and benefits discussed, surgical consent, monitors and equipment checked, pre-op evaluation and timeout performed  Spinal Block  Patient position: sitting  Prep: ChloraPrep  Patient monitoring: cardiac monitor, continuous pulse ox, continuous capnometry and frequent blood pressure checks  Approach: midline  Location: L3-4  Injection technique: single shot  CSF Fluid: clear free-flowing CSF  Needle  Needle type: pencil-tip   Needle gauge: 22 G  Additional Documentation: incremental injection  Needle localization: anatomical landmarks  Assessment  Sensory level: T4   Dermatomal levels determined by alcohol wipe

## 2024-07-03 NOTE — ANESTHESIA POSTPROCEDURE EVALUATION
Anesthesia Post Evaluation    Patient: Michael Malone    Procedure(s) Performed: * No procedures listed *    Final Anesthesia Type: spinal      Patient location during evaluation: ED  Patient participation: Yes- Able to Participate  Level of consciousness: awake and alert, awake and oriented  Post-procedure vital signs: reviewed and stable  Pain management: adequate  Airway patency: patent  NORAH mitigation strategies: Preoperative initiation of continuous positive airway pressure (CPAP) or non-invasive positive pressure ventilation (NIPPV), Multimodal analgesia and Use of major conduction anesthesia (spinal/epidural) or peripheral nerve block  PONV status at discharge: No PONV  Anesthetic complications: no      Cardiovascular status: blood pressure returned to baseline  Respiratory status: unassisted, room air and spontaneous ventilation  Hydration status: euvolemic  Follow-up not needed.              Vitals Value Taken Time   BP 98/78 07/02/24 2232   Temp 36.8 °C (98.2 °F) 07/02/24 1845   Pulse 107 07/02/24 2236   Resp 18 07/02/24 2112   SpO2 98 % 07/02/24 2236   Vitals shown include unfiled device data.      No case tracking events are documented in the log.      Pain/Amandeep Score: Pain Rating Prior to Med Admin: 5 (7/2/2024  9:12 PM)  Pain Rating Post Med Admin: 5 (7/2/2024  6:06 PM)

## 2024-07-03 NOTE — PROGRESS NOTES
Pharmacokinetic Assessment Follow Up: IV Vancomycin    Vancomycin Regimen Plan:    Restart 2 grams q12h.  Check trough at 0700 on 7/5/24.    Drug levels (last 3 results):  Recent Labs   Lab Result Units 07/03/24  1731   Vancomycin Trough ug/ml 4.9*         CBC (last 72 hours):  Recent Labs   Lab Result Units 07/02/24  1744 07/03/24  0449   WBC x10(3)/mcL 23.06* 19.31*   Hgb g/dL 14.3 12.3*   Hemoglobin A1c %  --  10.1*   Hct % 40.9 36.1*   Platelet x10(3)/mcL 293 269   Mono % %  --  9.2   Monocytes % % 12*  --    Eos % %  --  0.5   Basophil % %  --  0.3       Metabolic Panel (last 72 hours):  Recent Labs   Lab Result Units 07/02/24  1855 07/02/24  1911 07/03/24  0450 07/03/24  1530   Sodium mmol/L 133*  --  131*  --    Potassium mmol/L 4.6  --  4.3  --    Chloride mmol/L 96*  --  100  --    CO2 mmol/L 26  --  22  --    Glucose mg/dL 250*  --  376*  --    Glucose CSF  mg/dL  --   --   --  140*   Glucose, UA   --  >=1000*  --   --    Blood Urea Nitrogen mg/dL 11  --  12.6  --    Creatinine mg/dL 0.60*  --  0.96  --    Albumin g/dL 4.6  --  3.1*  --    Bilirubin Total mg/dL 1.0  --  0.7  --    ALP unit/L 88  --  88  --    AST unit/L 49  --  16  --    ALT unit/L 29  --  16  --    Magnesium Level mg/dL  --   --  1.40*  --    Phosphorus Level mg/dL  --   --  2.2*  --        Vancomycin Administrations:  vancomycin given in the last 96 hours                     vancomycin 2 g in dextrose 5 % 500 mL IVPB (mg) 2,000 mg New Bag 07/03/24 0446    vancomycin (VANCOCIN) 1,000 mg in D5W 250 mL IVPB (Vial-Mate) (mg) 1,000 mg New Bag 07/02/24 2020                    Microbiologic Results:  Microbiology Results (last 7 days)       Procedure Component Value Units Date/Time    Cryptococcal antigen, CSF [[id number]] Collected: 07/03/24 1530    Order Status: Sent Specimen: CSF (Spinal Fluid) Updated: 07/03/24 1733    Cerebrospinal Fluid Culture [[id number]] Collected: 07/03/24 1530    Order Status: Sent Specimen: CSF (Spinal Fluid) from  Cerebrospinal Fluid Updated: 07/03/24 1542

## 2024-07-04 LAB
ALBUMIN SERPL-MCNC: 3.4 G/DL (ref 3.5–5)
ALBUMIN/GLOB SERPL: 1 RATIO (ref 1.1–2)
ALP SERPL-CCNC: 105 UNIT/L (ref 40–150)
ALT SERPL-CCNC: 26 UNIT/L (ref 0–55)
ANION GAP SERPL CALC-SCNC: 9 MEQ/L
AST SERPL-CCNC: 29 UNIT/L (ref 5–34)
BASOPHILS # BLD AUTO: 0.05 X10(3)/MCL
BASOPHILS NFR BLD AUTO: 0.4 %
BILIRUB SERPL-MCNC: 0.4 MG/DL
BUN SERPL-MCNC: 8 MG/DL (ref 8.9–20.6)
CALCIUM SERPL-MCNC: 9.1 MG/DL (ref 8.4–10.2)
CHLORIDE SERPL-SCNC: 101 MMOL/L (ref 98–107)
CO2 SERPL-SCNC: 26 MMOL/L (ref 22–29)
CREAT SERPL-MCNC: 0.72 MG/DL (ref 0.73–1.18)
CREAT/UREA NIT SERPL: 11
CRYPTOC AG CSF QL IA.RAPID: NEGATIVE
EOSINOPHIL # BLD AUTO: 0.42 X10(3)/MCL (ref 0–0.9)
EOSINOPHIL NFR BLD AUTO: 3.3 %
ERYTHROCYTE [DISTWIDTH] IN BLOOD BY AUTOMATED COUNT: 13.2 % (ref 11.5–17)
GFR SERPLBLD CREATININE-BSD FMLA CKD-EPI: >60 ML/MIN/1.73/M2
GLOBULIN SER-MCNC: 3.4 GM/DL (ref 2.4–3.5)
GLUCOSE SERPL-MCNC: 247 MG/DL (ref 74–100)
HCT VFR BLD AUTO: 40.1 % (ref 42–52)
HGB BLD-MCNC: 13.7 G/DL (ref 14–18)
IMM GRANULOCYTES # BLD AUTO: 0.06 X10(3)/MCL (ref 0–0.04)
IMM GRANULOCYTES NFR BLD AUTO: 0.5 %
LYMPHOCYTES # BLD AUTO: 2.8 X10(3)/MCL (ref 0.6–4.6)
LYMPHOCYTES NFR BLD AUTO: 21.8 %
MCH RBC QN AUTO: 29.1 PG (ref 27–31)
MCHC RBC AUTO-ENTMCNC: 34.2 G/DL (ref 33–36)
MCV RBC AUTO: 85.1 FL (ref 80–94)
MONOCYTES # BLD AUTO: 1.38 X10(3)/MCL (ref 0.1–1.3)
MONOCYTES NFR BLD AUTO: 10.7 %
NEUTROPHILS # BLD AUTO: 8.13 X10(3)/MCL (ref 2.1–9.2)
NEUTROPHILS NFR BLD AUTO: 63.3 %
NRBC BLD AUTO-RTO: 0 %
PATH REV: NORMAL
PLATELET # BLD AUTO: 292 X10(3)/MCL (ref 130–400)
PMV BLD AUTO: 9.3 FL (ref 7.4–10.4)
POCT GLUCOSE: 213 MG/DL (ref 70–110)
POCT GLUCOSE: 372 MG/DL (ref 70–110)
POCT GLUCOSE: 398 MG/DL (ref 70–110)
POTASSIUM SERPL-SCNC: 4.2 MMOL/L (ref 3.5–5.1)
PROT SERPL-MCNC: 6.8 GM/DL (ref 6.4–8.3)
RBC # BLD AUTO: 4.71 X10(6)/MCL (ref 4.7–6.1)
SODIUM SERPL-SCNC: 136 MMOL/L (ref 136–145)
WBC # BLD AUTO: 12.84 X10(3)/MCL (ref 4.5–11.5)

## 2024-07-04 PROCEDURE — 85025 COMPLETE CBC W/AUTO DIFF WBC: CPT | Performed by: INTERNAL MEDICINE

## 2024-07-04 PROCEDURE — 80053 COMPREHEN METABOLIC PANEL: CPT

## 2024-07-04 PROCEDURE — 11000001 HC ACUTE MED/SURG PRIVATE ROOM

## 2024-07-04 PROCEDURE — 25000003 PHARM REV CODE 250: Performed by: INTERNAL MEDICINE

## 2024-07-04 PROCEDURE — 36415 COLL VENOUS BLD VENIPUNCTURE: CPT | Performed by: INTERNAL MEDICINE

## 2024-07-04 PROCEDURE — 63600175 PHARM REV CODE 636 W HCPCS: Performed by: INTERNAL MEDICINE

## 2024-07-04 RX ADMIN — LISINOPRIL 20 MG: 20 TABLET ORAL at 08:07

## 2024-07-04 RX ADMIN — MUPIROCIN: 20 OINTMENT TOPICAL at 08:07

## 2024-07-04 RX ADMIN — MONTELUKAST SODIUM 5 MG: 5 TABLET, CHEWABLE ORAL at 08:07

## 2024-07-04 RX ADMIN — ENOXAPARIN SODIUM 40 MG: 40 INJECTION SUBCUTANEOUS at 04:07

## 2024-07-04 RX ADMIN — GUAIFENESIN 600 MG: 600 TABLET, EXTENDED RELEASE ORAL at 08:07

## 2024-07-04 RX ADMIN — VANCOMYCIN HYDROCHLORIDE 2000 MG: 500 INJECTION, POWDER, LYOPHILIZED, FOR SOLUTION INTRAVENOUS at 08:07

## 2024-07-04 RX ADMIN — METFORMIN HYDROCHLORIDE 1000 MG: 500 TABLET, FILM COATED ORAL at 07:07

## 2024-07-04 RX ADMIN — INSULIN GLARGINE 20 UNITS: 100 INJECTION, SOLUTION SUBCUTANEOUS at 08:07

## 2024-07-04 RX ADMIN — VALACYCLOVIR 1000 MG: 500 TABLET, FILM COATED ORAL at 08:07

## 2024-07-04 RX ADMIN — ATORVASTATIN CALCIUM 10 MG: 10 TABLET, FILM COATED ORAL at 08:07

## 2024-07-04 RX ADMIN — CLONAZEPAM 1 MG: 1 TABLET ORAL at 08:07

## 2024-07-04 RX ADMIN — TRAZODONE HYDROCHLORIDE 75 MG: 50 TABLET ORAL at 02:07

## 2024-07-04 RX ADMIN — SODIUM CHLORIDE, POTASSIUM CHLORIDE, SODIUM LACTATE AND CALCIUM CHLORIDE: 600; 310; 30; 20 INJECTION, SOLUTION INTRAVENOUS at 04:07

## 2024-07-04 RX ADMIN — INSULIN ASPART 8 UNITS: 100 INJECTION, SOLUTION INTRAVENOUS; SUBCUTANEOUS at 04:07

## 2024-07-04 RX ADMIN — TRAZODONE HYDROCHLORIDE 75 MG: 50 TABLET ORAL at 10:07

## 2024-07-04 RX ADMIN — DAPAGLIFLOZIN 10 MG: 10 TABLET, FILM COATED ORAL at 04:07

## 2024-07-04 RX ADMIN — INSULIN ASPART 8 UNITS: 100 INJECTION, SOLUTION INTRAVENOUS; SUBCUTANEOUS at 07:07

## 2024-07-04 RX ADMIN — CITALOPRAM HYDROBROMIDE 10 MG: 10 TABLET ORAL at 08:07

## 2024-07-04 RX ADMIN — CEFTRIAXONE SODIUM 2 G: 2 INJECTION, POWDER, FOR SOLUTION INTRAMUSCULAR; INTRAVENOUS at 12:07

## 2024-07-04 RX ADMIN — MUPIROCIN: 20 OINTMENT TOPICAL at 09:07

## 2024-07-04 RX ADMIN — CEFTRIAXONE SODIUM 2 G: 2 INJECTION, POWDER, FOR SOLUTION INTRAMUSCULAR; INTRAVENOUS at 03:07

## 2024-07-04 RX ADMIN — VALACYCLOVIR 1000 MG: 500 TABLET, FILM COATED ORAL at 03:07

## 2024-07-04 RX ADMIN — INSULIN ASPART 8 UNITS: 100 INJECTION, SOLUTION INTRAVENOUS; SUBCUTANEOUS at 11:07

## 2024-07-04 RX ADMIN — VANCOMYCIN HYDROCHLORIDE 2000 MG: 500 INJECTION, POWDER, LYOPHILIZED, FOR SOLUTION INTRAVENOUS at 09:07

## 2024-07-04 NOTE — PROGRESS NOTES
[facility]  Hospital Medicine Progress Note        Chief Complaint: Inpatient Follow-up for fever, headache     HPI per admitting team: Michael Malone is a 28 y.o. Black or  male with a past medical history of hypertension, hyperlipidemia, diabetes mellitus type 2, anxiety/depression, autism. The patient presented to [facility] on 7/3/2024 as a transfer from [facility].  Patient presented to outside facility on 07/02/2024 with fever, headache, fatigue, body aches, cough, sore throat, nasal congestion, nausea and vomiting x1.  Workup revealed WBC of 23.06, sodium 133, chloride 96, glucose 250.  Influenza a/B, strep, SARS-COV-2 PCR negative.  UA negative for infection.  Chest x-ray without acute abnormality.  Lumbar puncture was attempted by ED physician but no fluid was obtained.  Anesthesia was therefore consulted for lumbar puncture which was also unsuccessful.  Patient was received IV fluid hydration, Zofran, morphine, Toradol, Dilaudid and Rocephin and vancomycin prior to transfer. Upon presentation [facility] temperature 100.6° F, heart rate 123, blood pressure 109/73 and SpO2 97% on room air.  On exam patient reports having subjective fevers and a headache located to the top of his head since 07/01/2024.  He continues to experienced sore throat and nasal congestion.  He denies complaints of abdominal pain, nausea, diarrhea, chest pain, shortness of breath, recent dorm stay/alf time and history of meningitis.  Mother at bedside reports patient has been having forgetfulness progressively worsening since November 2023.  Patient is admitted to hospital medicine services for further medical management.     Interval Hx:   Patient is sitting in bed, complains of cough and yellow sputum production.  Reports he feels congested.  Other than that patient denies headache, visual disturbance, chest pain, palpitation    ioMother is at bedside.  I answered all their questions to  the best of my knowledge in their satis  Case was discussed with patient's nurse on the floor.    Objective/physical exam:  General: In no acute distress, afebrile  HEENT: No neck stiffness + mario submandibular tenderness on palpation.   Oral cavity + mario swollen tonsils  Chest: Clear to auscultation bilaterally to the bases, no wheezing  Heart:  near tachycardic rate and rhythm, +S1, S2, no appreciable murmur  Abdomen: Soft, nontender, BS +  MSK: Warm, no lower extremity edema, no clubbing or cyanosis  Neurologic:  Awake, alert and oriented. Motor and sensory exams grossly intact.   Able to walk in the room, took shower by himself earlier today    VITAL SIGNS: 24 HRS MIN & MAX LAST   Temp  Min: 97.6 °F (36.4 °C)  Max: 99 °F (37.2 °C) 97.7 °F (36.5 °C)   BP  Min: 100/62  Max: 135/80 100/62   Pulse  Min: 85  Max: 109  89   Resp  Min: 20  Max: 20 20   SpO2  Min: 96 %  Max: 98 % 98 %     I reviewed the labs below:  Recent Labs   Lab 07/02/24  1744 07/03/24  0449 07/04/24  0347   WBC 23.06* 19.31* 12.84*   RBC 4.83 4.15* 4.71   HGB 14.3 12.3* 13.7*   HCT 40.9 36.1* 40.1*   MCV 84.7 87.0 85.1   MCH 29.6 29.6 29.1   MCHC 35.0 34.1 34.2   RDW 13.0 13.3 13.2    269 292   MPV 8.7* 9.2 9.3     Recent Labs   Lab 07/02/24  1855 07/03/24  0450 07/04/24  0347   * 131* 136   K 4.6 4.3 4.2   CL 96* 100 101   CO2 26 22 26   BUN 11 12.6 8.0*   CREATININE 0.60* 0.96 0.72*   CALCIUM 9.7 8.4 9.1   MG  --  1.40*  --    ALBUMIN 4.6 3.1* 3.4*   ALKPHOS 88 88 105   ALT 29 16 26   AST 49 16 29   BILITOT 1.0 0.7 0.4     Microbiology Results (last 7 days)       Procedure Component Value Units Date/Time    Cerebrospinal Fluid Culture [[id number]] Collected: 07/03/24 1530    Order Status: Completed Specimen: CSF (Spinal Fluid) from Cerebrospinal Fluid Updated: 07/04/24 0700     CULTURE, CSF No Growth At 24 Hours     GRAM STAIN No WBCs, No bacteria seen    Cryptococcal antigen, CSF [[id number]] Collected: 07/03/24 1530    Order  Status: Sent Specimen: CSF (Spinal Fluid) Updated: 07/03/24 5378           See below for Radiology    Intake and Output:    Intake/Output Summary (Last 24 hours) at 7/4/2024 0759  Last data filed at 7/3/2024 1747  Gross per 24 hour   Intake 1140 ml   Output 200 ml   Net 940 ml       Assessment/Plan:  Sepsis, etiology from tonsillitis r/o encephalitis   Hyponatremia/hypochloremia   Diabetes mellitus type 2 with hyperglycemia, uncontrolled as A1c 10  History of hypertension, hyperlipidemia, depression/anxiety and autism    morbid obesity    7/3- Underwent lumbar puncture  CSF Gram stain and culture shows no WBC, no bacteria, no growth  at 24 hours   CSF cryptococcal antigen-negative  CSF PCR-negative  Influenza a, B negative, strep group A - negative  Continue Rocephin, vancomycin and valacyclovir  1000 mg t.I.d. -day 2.     Leukocytosis trending down 23 K-->  12 K  Blood cultures x2-negative at 24 hours  Complains of sinus congestion, sputum productive of yellow color and cough  Start Robitussin DM 10 mL q.4 scheduled  Tylenol as needed for fever   Respiratory panel negative   Hepatitis panel and HIV ordered.  Follow results  Accu-Cheks and sliding scale  Hemoglobin A1c 10.1,  uncontrolled, diet changed to diabetic cardiac  Resume appropriate home medications when deemed necessary    Morning CBC,  Mag ordered    VTE prophylaxis:   SCDs                      Patient condition:  Stable    Anticipated discharge and Disposition:    probably home tomorrow      All diagnosis and differential diagnosis have been reviewed; assessment and plan has been documented; I have personally reviewed the labs and test results that are presently available; I have reviewed the patients medication list; I have reviewed the consulting providers response and recommendations. I have reviewed or attempted to review medical records based upon their availability    All of the patient's questions have been  addressed and answered. Patient's is  agreeable to the above stated plan. I will continue to monitor closely and make adjustments to medical management as needed.    Portions of this note dictated using EMR integrated voice recognition software, and may be subject to voice recognition errors not corrected at proofreading. Please contact writer for clarification if needed.   _____________________________________________________________________    Nutrition Status:  No RD notes     A minimum of two characteristics is recommended for diagnosis of either severe or non-severe malnutrition.     Current Med:   atorvastatin  10 mg Oral QHS    cefTRIAXone (Rocephin) IV (PEDS and ADULTS)  2 g Intravenous Q12H    citalopram  10 mg Oral Daily    clonazePAM  1 mg Oral Daily    dapagliflozin propanediol  10 mg Oral Daily    enoxparin  40 mg Subcutaneous Q24H (prophylaxis, 1700)    guaiFENesin  600 mg Oral BID    insulin aspart U-100  8 Units Subcutaneous TIDWM    insulin glargine U-100  20 Units Subcutaneous QHS    lisinopriL  20 mg Oral Daily    metFORMIN  1,000 mg Oral Daily with breakfast    montelukast  5 mg Oral Daily    mupirocin   Nasal BID    valACYclovir  1,000 mg Oral TID    vancomycin (VANCOCIN) IV (PEDS and ADULTS)  2,000 mg Intravenous Q12H      PRN meds:  Current Facility-Administered Medications:     acetaminophen, 650 mg, Oral, Q4H PRN    aluminum-magnesium hydroxide-simethicone, 30 mL, Oral, QID PRN    butalbital-acetaminophen-caffeine -40 mg, 1 tablet, Oral, Q4H PRN    dextrose 10%, 12.5 g, Intravenous, PRN    dextrose 10%, 25 g, Intravenous, PRN    glucagon (human recombinant), 1 mg, Intramuscular, PRN    glucose, 16 g, Oral, PRN    glucose, 24 g, Oral, PRN    insulin aspart U-100, 1-10 Units, Subcutaneous, QID (AC + HS) PRN    melatonin, 6 mg, Oral, Nightly PRN    ondansetron, 4 mg, Intravenous, Q4H PRN    polyethylene glycol, 17 g, Oral, BID PRN    prochlorperazine, 5 mg, Intravenous, Q6H PRN    senna-docusate 8.6-50 mg, 2 tablet, Oral, BID  PRN    sodium chloride 0.9%, 10 mL, Intravenous, PRN    traZODone, 75 mg, Oral, Nightly PRN    Pharmacy to dose Vancomycin consult, , , Once **AND** vancomycin - pharmacy to dose, , Intravenous, pharmacy to manage frequency    Continuous infusion:   lactated ringers   Intravenous Continuous 75 mL/hr at 07/04/24 0424 New Bag at 07/04/24 0424        Radiology:   I have personally reviewed the images and agree with radiologist report  FL LUMBAR PUNCTURE DIAGNOSTIC WITH IMAGING  Narrative: EXAMINATION:  FL LUMBAR PUNCTURE DIAGNOSTIC WITH IMAGING    CLINICAL HISTORY:  Meningitis;    TECHNIQUE:  Procedure explained to the patient and his mother and informed consent obtained. Suitable skin entry site chosen under fluoroscopy. Lower back prepped and draped in sterile fashion. Lidocaine used for local anesthesia.    COMPARISON:  No relevant comparison studies available at the time of dictation.    FINDINGS:  Under intermittent fluoroscopic guidance, a 22-gauge spinal needle was advanced into the thecal sac with position confirmed by flow of CSF.  Opening pressure 17 cm CSF. Approximately 13 mL of CSF collected and sent to the lab for further evaluation.  Closing pressure less than 13 cm CSF.  The needle was removed and hemostasis achieved at the skin puncture site. No immediate complication. Estimated blood loss negligible.    Absorbed dose is 2.75 mGy.  Impression: Successful fluoroscopic guided lumbar puncture.    Electronically signed by: Carlos Fong  Date:    07/03/2024  Time:    17:02        rEic Roman MD  Department of Hospital Medicine   [facility]   07/04/2024

## 2024-07-05 VITALS
HEIGHT: 73 IN | BODY MASS INDEX: 35.78 KG/M2 | SYSTOLIC BLOOD PRESSURE: 118 MMHG | HEART RATE: 80 BPM | TEMPERATURE: 98 F | OXYGEN SATURATION: 97 % | WEIGHT: 270 LBS | DIASTOLIC BLOOD PRESSURE: 79 MMHG | RESPIRATION RATE: 18 BRPM

## 2024-07-05 PROBLEM — R09.81 SINUS CONGESTION: Status: ACTIVE | Noted: 2024-07-05

## 2024-07-05 PROBLEM — J06.9 UPPER RESPIRATORY INFECTION: Status: ACTIVE | Noted: 2024-07-05

## 2024-07-05 LAB
ABS NEUT (OLG): 5.21 X10(3)/MCL (ref 2.1–9.2)
ALBUMIN SERPL-MCNC: 3.4 G/DL (ref 3.5–5)
ALBUMIN/GLOB SERPL: 1 RATIO (ref 1.1–2)
ALP SERPL-CCNC: 113 UNIT/L (ref 40–150)
ALT SERPL-CCNC: 47 UNIT/L (ref 0–55)
ANION GAP SERPL CALC-SCNC: 10 MEQ/L
AST SERPL-CCNC: 53 UNIT/L (ref 5–34)
BASOPHILS NFR BLD MANUAL: 0.09 X10(3)/MCL (ref 0–0.2)
BASOPHILS NFR BLD MANUAL: 1 %
BILIRUB SERPL-MCNC: 0.4 MG/DL
BUN SERPL-MCNC: 8.4 MG/DL (ref 8.9–20.6)
CALCIUM SERPL-MCNC: 9.4 MG/DL (ref 8.4–10.2)
CHLORIDE SERPL-SCNC: 100 MMOL/L (ref 98–107)
CO2 SERPL-SCNC: 24 MMOL/L (ref 22–29)
CREAT SERPL-MCNC: 0.75 MG/DL (ref 0.73–1.18)
CREAT/UREA NIT SERPL: 11
EOSINOPHIL NFR BLD MANUAL: 0.99 X10(3)/MCL (ref 0–0.9)
EOSINOPHIL NFR BLD MANUAL: 11 %
ERYTHROCYTE [DISTWIDTH] IN BLOOD BY AUTOMATED COUNT: 13 % (ref 11.5–17)
GFR SERPLBLD CREATININE-BSD FMLA CKD-EPI: >60 ML/MIN/1.73/M2
GLOBULIN SER-MCNC: 3.4 GM/DL (ref 2.4–3.5)
GLUCOSE SERPL-MCNC: 319 MG/DL (ref 74–100)
HCT VFR BLD AUTO: 38 % (ref 42–52)
HGB BLD-MCNC: 13.5 G/DL (ref 14–18)
INSTRUMENT WBC (OLG): 8.98 X10(3)/MCL
LYMPHOCYTES NFR BLD MANUAL: 1.89 X10(3)/MCL
LYMPHOCYTES NFR BLD MANUAL: 21 %
MAGNESIUM SERPL-MCNC: 1.6 MG/DL (ref 1.6–2.6)
MCH RBC QN AUTO: 29.3 PG (ref 27–31)
MCHC RBC AUTO-ENTMCNC: 35.5 G/DL (ref 33–36)
MCV RBC AUTO: 82.6 FL (ref 80–94)
MONOCYTES NFR BLD MANUAL: 0.9 X10(3)/MCL (ref 0.1–1.3)
MONOCYTES NFR BLD MANUAL: 10 %
NEUTROPHILS NFR BLD MANUAL: 58 %
NRBC BLD AUTO-RTO: 0 %
PLATELET # BLD AUTO: 322 X10(3)/MCL (ref 130–400)
PLATELET # BLD EST: NORMAL 10*3/UL
PMV BLD AUTO: 8.9 FL (ref 7.4–10.4)
POCT GLUCOSE: 247 MG/DL (ref 70–110)
POCT GLUCOSE: 326 MG/DL (ref 70–110)
POIKILOCYTOSIS BLD QL SMEAR: ABNORMAL
POTASSIUM SERPL-SCNC: 4.5 MMOL/L (ref 3.5–5.1)
PROT SERPL-MCNC: 6.8 GM/DL (ref 6.4–8.3)
RBC # BLD AUTO: 4.6 X10(6)/MCL (ref 4.7–6.1)
RBC MORPH BLD: ABNORMAL
SODIUM SERPL-SCNC: 134 MMOL/L (ref 136–145)
TEAR DROP CELL (OLG): ABNORMAL
WBC # BLD AUTO: 8.98 X10(3)/MCL (ref 4.5–11.5)

## 2024-07-05 PROCEDURE — 25000003 PHARM REV CODE 250: Performed by: INTERNAL MEDICINE

## 2024-07-05 PROCEDURE — 83735 ASSAY OF MAGNESIUM: CPT | Performed by: INTERNAL MEDICINE

## 2024-07-05 PROCEDURE — 80053 COMPREHEN METABOLIC PANEL: CPT | Performed by: INTERNAL MEDICINE

## 2024-07-05 PROCEDURE — 36415 COLL VENOUS BLD VENIPUNCTURE: CPT | Performed by: INTERNAL MEDICINE

## 2024-07-05 PROCEDURE — 63600175 PHARM REV CODE 636 W HCPCS: Performed by: INTERNAL MEDICINE

## 2024-07-05 PROCEDURE — 85025 COMPLETE CBC W/AUTO DIFF WBC: CPT | Performed by: INTERNAL MEDICINE

## 2024-07-05 RX ORDER — GUAIFENESIN AND DEXTROMETHORPHAN HYDROBROMIDE 10; 100 MG/5ML; MG/5ML
10 SYRUP ORAL EVERY 4 HOURS
Status: DISCONTINUED | OUTPATIENT
Start: 2024-07-05 | End: 2024-07-05 | Stop reason: HOSPADM

## 2024-07-05 RX ORDER — AMOXICILLIN AND CLAVULANATE POTASSIUM 500; 125 MG/1; MG/1
1 TABLET, FILM COATED ORAL 2 TIMES DAILY
Qty: 6 TABLET | Refills: 0 | Status: SHIPPED | OUTPATIENT
Start: 2024-07-05 | End: 2024-07-08

## 2024-07-05 RX ORDER — GUAIFENESIN AND DEXTROMETHORPHAN HYDROBROMIDE 10; 100 MG/5ML; MG/5ML
10 SYRUP ORAL EVERY 4 HOURS
Qty: 354 ML | Refills: 0 | Status: SHIPPED | OUTPATIENT
Start: 2024-07-05 | End: 2024-07-15

## 2024-07-05 RX ORDER — AMOXICILLIN AND CLAVULANATE POTASSIUM 500; 125 MG/1; MG/1
1 TABLET, FILM COATED ORAL 2 TIMES DAILY
Status: DISCONTINUED | OUTPATIENT
Start: 2024-07-05 | End: 2024-07-05 | Stop reason: HOSPADM

## 2024-07-05 RX ADMIN — SODIUM CHLORIDE, POTASSIUM CHLORIDE, SODIUM LACTATE AND CALCIUM CHLORIDE: 600; 310; 30; 20 INJECTION, SOLUTION INTRAVENOUS at 02:07

## 2024-07-05 RX ADMIN — LISINOPRIL 20 MG: 20 TABLET ORAL at 08:07

## 2024-07-05 RX ADMIN — INSULIN ASPART 8 UNITS: 100 INJECTION, SOLUTION INTRAVENOUS; SUBCUTANEOUS at 07:07

## 2024-07-05 RX ADMIN — CEFTRIAXONE SODIUM 2 G: 2 INJECTION, POWDER, FOR SOLUTION INTRAMUSCULAR; INTRAVENOUS at 12:07

## 2024-07-05 RX ADMIN — VANCOMYCIN HYDROCHLORIDE 2000 MG: 500 INJECTION, POWDER, LYOPHILIZED, FOR SOLUTION INTRAVENOUS at 08:07

## 2024-07-05 RX ADMIN — VALACYCLOVIR 1000 MG: 500 TABLET, FILM COATED ORAL at 08:07

## 2024-07-05 RX ADMIN — AMOXICILLIN AND CLAVULANATE POTASSIUM 500 MG: 500; 125 TABLET, FILM COATED ORAL at 12:07

## 2024-07-05 RX ADMIN — MUPIROCIN: 20 OINTMENT TOPICAL at 08:07

## 2024-07-05 RX ADMIN — MONTELUKAST SODIUM 5 MG: 5 TABLET, CHEWABLE ORAL at 08:07

## 2024-07-05 RX ADMIN — CITALOPRAM HYDROBROMIDE 10 MG: 10 TABLET ORAL at 08:07

## 2024-07-05 RX ADMIN — GUAIFENESIN AND DEXTROMETHORPHAN 10 ML: 100; 10 SYRUP ORAL at 12:07

## 2024-07-05 RX ADMIN — METFORMIN HYDROCHLORIDE 1000 MG: 500 TABLET, FILM COATED ORAL at 07:07

## 2024-07-05 RX ADMIN — BUTALBITAL, ACETAMINOPHEN, AND CAFFEINE 1 TABLET: 325; 50; 40 TABLET ORAL at 08:07

## 2024-07-05 RX ADMIN — CLONAZEPAM 1 MG: 1 TABLET ORAL at 08:07

## 2024-07-05 RX ADMIN — GUAIFENESIN 600 MG: 600 TABLET, EXTENDED RELEASE ORAL at 08:07

## 2024-07-05 RX ADMIN — INSULIN ASPART 8 UNITS: 100 INJECTION, SOLUTION INTRAVENOUS; SUBCUTANEOUS at 11:07

## 2024-07-05 NOTE — DISCHARGE SUMMARY
[facility] Hospital Medicine Discharge Summary    Admit Date: 7/3/2024  Discharge Date and Time: 7/5/202412:00 PM  Admitting Physician:  Team  Discharging Physician: Eric Roman MD.  Primary Care Physician: Maribell Witt FNP  Consults: None    Discharge Diagnoses:  Sepsis, etiology from tonsillitis r/o encephalitis   Hyponatremia/hypochloremia   Diabetes mellitus type 2 with hyperglycemia, uncontrolled as A1c 10  History of hypertension, hyperlipidemia, depression/anxiety and autism  Obesity    Hospital Course:   Michael Malone is a 28 y.o. Black or  male with a past medical history of hypertension, hyperlipidemia, diabetes mellitus type 2, anxiety/depression, autism. The patient presented to [facility] on 7/3/2024 as a transfer from [facility].  Patient presented to outside facility on 07/02/2024 with fever, headache, fatigue, body aches, cough, sore throat, nasal congestion, nausea and vomiting x1.  Workup revealed WBC of 23.06, sodium 133, chloride 96, glucose 250.  Influenza a/B, strep, SARS-COV-2 PCR negative.  UA negative for infection.  Chest x-ray without acute abnormality.  Lumbar puncture was attempted by ED physician but no fluid was obtained.  Anesthesia was therefore consulted for lumbar puncture which was also unsuccessful.  Patient was received IV fluid hydration, Zofran, morphine, Toradol, Dilaudid and Rocephin and vancomycin prior to transfer. Upon presentation [facility] temperature 100.6° F, heart rate 123, blood pressure 109/73 and SpO2 97% on room air.  On exam patient reports having subjective fevers and a headache located to the top of his head since 07/01/2024.  He continues to experienced sore throat and nasal congestion.  He denies complaints of abdominal pain, nausea, diarrhea, chest pain, shortness of breath, recent dorm stay/care home time and history of meningitis.  Mother at bedside reports patient has been having forgetfulness  progressively worsening since November 2023.  Patient is admitted to hospital medicine services for further medical management.   7/3- Underwent lumbar puncture to r/o meningitis   CSF Gram stain and culture shows no WBC, no bacteria, no growth  at 48 hours   CSF cryptococcal antigen-negative  CSF PCR-negative  Influenza a, B negative, strep group A - negative  Discontinue Rocephin, vancomycin and valacyclovir on day 3 as memingituis ruled out and switched to Augmentin BID x3 more days for sinus infection/ URI  Leukocytosis resolved, now 8 K  Blood cultures x2-negative at 48 hours  Complains of sinus congestion, sputum productive of yellow color and cough  Start Robitussin DM 10 mL q.4 scheduled  Afebrile   Respiratory panel negative   Hepatitis panel, HIV, syphilis - all negative   Accu-Cheks and sliding scale  Hemoglobin A1c 10.1,  uncontrolled, diet changed to diabetic cardiac  Pt and mom requesting discharge. Pt is cleared for discharge     Pt was seen and examined on the day of discharge  Vitals:  VITAL SIGNS: 24 HRS MIN & MAX LAST   Temp  Min: 97.5 °F (36.4 °C)  Max: 97.9 °F (36.6 °C) 97.6 °F (36.4 °C)   BP  Min: 110/75  Max: 134/84 118/79   Pulse  Min: 74  Max: 96  80   Resp  Min: 18  Max: 20 18   SpO2  Min: 96 %  Max: 100 % 97 %       Physical Exam:  General: In no acute distress, afebrile  HEENT: No neck stiffness + mario submandibular tenderness on palpation improved  Chest: Clear to auscultation bilaterally to the bases, no wheezing  Heart:  near tachycardic rate and rhythm, +S1, S2, no appreciable murmur  Abdomen: Soft, nontender, BS +  MSK: Warm, no lower extremity edema, no clubbing or cyanosis  Neurologic:  Awake, alert and oriented. Motor and sensory exams grossly intact.   Able to walk in the room, took shower by himself earlier today    Recent Labs   Lab 07/03/24  0449 07/04/24  0347 07/05/24  0958   WBC 19.31* 12.84* 8.98  8.98   RBC 4.15* 4.71 4.60*   HGB 12.3* 13.7* 13.5*   HCT 36.1* 40.1* 38.0*    MCV 87.0 85.1 82.6   MCH 29.6 29.1 29.3   MCHC 34.1 34.2 35.5   RDW 13.3 13.2 13.0    292 322   MPV 9.2 9.3 8.9       Recent Labs   Lab 07/03/24  0450 07/04/24  0347 07/05/24  0958   * 136 134*   K 4.3 4.2 4.5    101 100   CO2 22 26 24   BUN 12.6 8.0* 8.4*   CREATININE 0.96 0.72* 0.75   CALCIUM 8.4 9.1 9.4   MG 1.40*  --  1.60   ALBUMIN 3.1* 3.4* 3.4*   ALKPHOS 88 105 113   ALT 16 26 47   AST 16 29 53*   BILITOT 0.7 0.4 0.4        Microbiology Results (last 7 days)       Procedure Component Value Units Date/Time    Cerebrospinal Fluid Culture [[id number]] Collected: 07/03/24 1530    Order Status: Completed Specimen: CSF (Spinal Fluid) from Cerebrospinal Fluid Updated: 07/05/24 0749     CULTURE, CSF No Growth At 48 Hours     GRAM STAIN No WBCs, No bacteria seen    Cryptococcal antigen, CSF [[id number]]  (Normal) Collected: 07/03/24 1530    Order Status: Completed Specimen: CSF (Spinal Fluid) Updated: 07/04/24 1435     Cryptococcal Antigen, CSF Negative             FL LUMBAR PUNCTURE DIAGNOSTIC WITH IMAGING  Narrative: EXAMINATION:  FL LUMBAR PUNCTURE DIAGNOSTIC WITH IMAGING    CLINICAL HISTORY:  Meningitis;    TECHNIQUE:  Procedure explained to the patient and his mother and informed consent obtained. Suitable skin entry site chosen under fluoroscopy. Lower back prepped and draped in sterile fashion. Lidocaine used for local anesthesia.    COMPARISON:  No relevant comparison studies available at the time of dictation.    FINDINGS:  Under intermittent fluoroscopic guidance, a 22-gauge spinal needle was advanced into the thecal sac with position confirmed by flow of CSF.  Opening pressure 17 cm CSF. Approximately 13 mL of CSF collected and sent to the lab for further evaluation.  Closing pressure less than 13 cm CSF.  The needle was removed and hemostasis achieved at the skin puncture site. No immediate complication. Estimated blood loss negligible.    Absorbed dose is 2.75 mGy.  Impression:  Successful fluoroscopic guided lumbar puncture.    Electronically signed by: Carlos Fong  Date:    07/03/2024  Time:    17:02         Medication List        START taking these medications      amoxicillin-clavulanate 500-125mg 500-125 mg Tab  Commonly known as: AUGMENTIN  Take 1 tablet (500 mg total) by mouth 2 (two) times daily. for 3 days     dextromethorphan-guaiFENesin  mg/5 ml  mg/5 mL liquid  Commonly known as: ROBITUSSIN-DM  Take 10 mLs by mouth every 4 (four) hours. for 10 days            CONTINUE taking these medications      atorvastatin 10 MG tablet  Commonly known as: LIPITOR     citalopram 10 MG tablet  Commonly known as: CeleXA  Take 1 tablet (10 mg total) by mouth once daily.     clonazePAM 1 MG tablet  Commonly known as: KlonoPIN     donepeziL 10 MG tablet  Commonly known as: ARICEPT     FARXIGA 10 mg tablet  Generic drug: dapagliflozin propanediol  Take 1 tablet (10 mg total) by mouth Daily.     lisinopriL 20 MG tablet  Commonly known as: PRINIVIL,ZESTRIL     metFORMIN 1000 MG tablet  Commonly known as: GLUCOPHAGE     montelukast 5 MG chewable tablet  Commonly known as: SINGULAIR  Take 1 tablet (5 mg total) by mouth once daily.     SAXagliptin 5 mg Tab tablet  Commonly known as: ONGLYZA     TOUJEO SOLOSTAR U-300 INSULIN 300 unit/mL (1.5 mL) Inpn pen  Generic drug: insulin glargine (TOUJEO)     traZODone 150 MG tablet  Commonly known as: DESYREL     TRULICITY 4.5 mg/0.5 mL pen injector  Generic drug: dulaglutide     VASCEPA 1 gram Cap  Generic drug: icosapent ethyL  Take 2 capsules (2,000 mg total) by mouth every evening.            STOP taking these medications      fluticasone propionate 50 mcg/actuation nasal spray  Commonly known as: FLONASE     pioglitazone 30 MG tablet  Commonly known as: ACTOS               Where to Get Your Medications        These medications were sent to [facility]/pharmacy #[id number] - [place] - [address]  [address] [address]      Phone:  [phone]   amoxicillin-clavulanate 500-125mg 500-125 mg Tab  dextromethorphan-guaiFENesin  mg/5 ml  mg/5 mL liquid          Explained in detail to the patient about the discharge plan, medications, and follow-up visits. Pt understands and agrees with the treatment plan  Discharge Disposition: Home   Discharged Condition: stable  Diet-   Dietary Orders (From admission, onward)       Start     Ordered    07/04/24 1611  Diet diabetic Cardiac (Low Na/Chol); 1800 Calorie  Diet effective now        Question Answer Comment   Diet Modifier: Cardiac (Low Na/Chol)    Total calories: 1800 Calorie        07/04/24 1610                   Medications Per DC med rec  Activities as tolerated   Follow-up Information       Maribell Witt FNP. Schedule an appointment as soon as possible for a visit in 2 week(s).    Specialty: Family Medicine  Why: post discharge  Contact information:  [address] [place] [address]  [phone]                           For further questions contact hospitalist office    Discharge time 34 minutes    For worsening symptoms, chest pain, shortness of breath, increased abdominal pain, high grade fever, stroke or stroke like symptoms, immediately go to the nearest Emergency Room or call 911 as soon as possible.    Portions of this note dictated using EMR integrated voice recognition software, and may be subject to voice recognition errors not corrected at proofreading. Please contact writer for clarification if needed    Eric Roman MD  Department of Hospital Medicine   [facility]   07/05/2024 12:00 PM

## 2024-07-07 LAB — BACTERIA BLD CULT: NORMAL

## 2024-07-08 LAB
BACTERIA BLD CULT: NORMAL
BACTERIA CSF CULT: NORMAL
GRAM STN SPEC: NORMAL
VDRL CSF QL: NEGATIVE

## 2024-07-09 ENCOUNTER — PATIENT OUTREACH (OUTPATIENT)
Dept: ADMINISTRATIVE | Facility: CLINIC | Age: 29
End: 2024-07-09
Payer: MEDICARE

## 2024-07-09 NOTE — PROGRESS NOTES
2nd attempt-C3 nurse attempted to contact Michael Malone for a TCC post hospital discharge follow up call. Patient answered but requested a call back on tomorrow. The patient does not have a scheduled HOSFU appointment. Message sent to PCP staff for assistance with scheduling visit with patient.

## 2024-07-09 NOTE — PROGRESS NOTES
C3 nurse attempted to contact Michael Malone for a TCC post hospital discharge follow up call. No answer. Left voicemail with callback information. The patient does not have a scheduled HOSFU appointment. Message sent to PCP staff for assistance with scheduling visit with patient.

## 2024-07-10 NOTE — PROGRESS NOTES
3rd attempt-C3 nurse attempted to contact Michael Malone for a TCC post hospital discharge follow up call. No answer. Left voicemail with callback information. The patient has a scheduled HOSFU appointment with Maribell Witt FNP on 7/22/24 @ 10:30am.

## 2024-07-11 NOTE — PHYSICIAN QUERY
Due to the conflicting clinical picture, please clinically validate the diagnosis of sepsis. If validated, please provide additional clinical support for the diagnosis:   Other clarification (please specify): clinically undetermined

## 2024-07-13 ENCOUNTER — HOSPITAL ENCOUNTER (EMERGENCY)
Facility: HOSPITAL | Age: 29
Discharge: HOME | End: 2024-07-14
Attending: SURGERY
Payer: MEDICARE

## 2024-07-13 DIAGNOSIS — R05.9 COUGH: Primary | ICD-10-CM

## 2024-07-13 DIAGNOSIS — R82.4 KETONURIA: ICD-10-CM

## 2024-07-13 DIAGNOSIS — J15.7 PNEUMONIA OF BOTH LUNGS DUE TO MYCOPLASMA PNEUMONIAE, UNSPECIFIED PART OF LUNG: ICD-10-CM

## 2024-07-13 DIAGNOSIS — E10.65 UNCONTROLLED TYPE 1 DIABETES MELLITUS WITH HYPERGLYCEMIA: ICD-10-CM

## 2024-07-13 LAB
ALBUMIN SERPL-MCNC: 4.6 G/DL (ref 3.4–5)
ALBUMIN/GLOB SERPL: 1.2 RATIO
ALP SERPL-CCNC: 137 UNIT/L (ref 50–144)
ALT SERPL-CCNC: 63 UNIT/L (ref 1–45)
ANION GAP SERPL CALC-SCNC: 9 MEQ/L (ref 2–13)
AST SERPL-CCNC: 59 UNIT/L (ref 17–59)
BASOPHILS # BLD AUTO: 0.05 X10(3)/MCL (ref 0.01–0.08)
BASOPHILS NFR BLD AUTO: 0.4 % (ref 0.1–1.2)
BILIRUB SERPL-MCNC: 0.8 MG/DL (ref 0–1)
BILIRUB UR QL STRIP.AUTO: NEGATIVE
BUN SERPL-MCNC: 12 MG/DL (ref 7–20)
CALCIUM SERPL-MCNC: 10.1 MG/DL (ref 8.4–10.2)
CHLORIDE SERPL-SCNC: 97 MMOL/L (ref 98–110)
CLARITY UR: CLEAR
CO2 SERPL-SCNC: 24 MMOL/L (ref 21–32)
COLOR UR AUTO: YELLOW
CREAT SERPL-MCNC: 0.45 MG/DL (ref 0.66–1.25)
CREAT/UREA NIT SERPL: 27 (ref 12–20)
EOSINOPHIL # BLD AUTO: 0.19 X10(3)/MCL (ref 0.04–0.54)
EOSINOPHIL NFR BLD AUTO: 1.4 % (ref 0.7–7)
ERYTHROCYTE [DISTWIDTH] IN BLOOD BY AUTOMATED COUNT: 12.6 %
GFR SERPLBLD CREATININE-BSD FMLA CKD-EPI: >90 ML/MIN/1.73/M2
GLOBULIN SER-MCNC: 3.7 GM/DL (ref 2–3.9)
GLUCOSE SERPL-MCNC: 290 MG/DL (ref 70–115)
GLUCOSE UR QL STRIP: >=1000
HCT VFR BLD AUTO: 41 % (ref 36–52)
HGB BLD-MCNC: 14.7 G/DL (ref 13–18)
HGB UR QL STRIP: NEGATIVE
IMM GRANULOCYTES # BLD AUTO: 0.06 X10(3)/MCL (ref 0–0.03)
IMM GRANULOCYTES NFR BLD AUTO: 0.4 % (ref 0–0.5)
INFLUENZA A (OHS): NEGATIVE
INFLUENZA B (OHS): NEGATIVE
KETONES UR QL STRIP: 15
LEUKOCYTE ESTERASE UR QL STRIP: NEGATIVE
LYMPHOCYTES # BLD AUTO: 3.86 X10(3)/MCL (ref 1.32–3.57)
LYMPHOCYTES NFR BLD AUTO: 27.8 % (ref 20–55)
MCH RBC QN AUTO: 29.8 PG (ref 27–34)
MCHC RBC AUTO-ENTMCNC: 35.9 G/DL (ref 31–37)
MCV RBC AUTO: 83 FL (ref 79–99)
MONOCYTES # BLD AUTO: 0.8 X10(3)/MCL (ref 0.3–0.82)
MONOCYTES NFR BLD AUTO: 5.8 % (ref 4.7–12.5)
NEUTROPHILS # BLD AUTO: 8.93 X10(3)/MCL (ref 1.78–5.38)
NEUTROPHILS NFR BLD AUTO: 64.2 % (ref 37–73)
NITRITE UR QL STRIP: NEGATIVE
NRBC BLD AUTO-RTO: 0 %
PH UR STRIP: 6 [PH]
PLATELET # BLD AUTO: 297 X10(3)/MCL (ref 140–371)
PMV BLD AUTO: 8.5 FL (ref 9.4–12.4)
POTASSIUM SERPL-SCNC: 4.3 MMOL/L (ref 3.5–5.1)
PROT SERPL-MCNC: 8.3 GM/DL (ref 6.3–8.2)
PROT UR QL STRIP: NEGATIVE
RBC # BLD AUTO: 4.94 X10(6)/MCL (ref 4–6)
SARS-COV-2 RDRP RESP QL NAA+PROBE: NEGATIVE
SODIUM SERPL-SCNC: 130 MMOL/L (ref 136–145)
SP GR UR STRIP.AUTO: 1.01 (ref 1–1.03)
UROBILINOGEN UR STRIP-ACNC: 0.2
WBC # BLD AUTO: 13.89 X10(3)/MCL (ref 4–11.5)

## 2024-07-13 PROCEDURE — 87400 INFLUENZA A/B EACH AG IA: CPT | Performed by: NURSE PRACTITIONER

## 2024-07-13 PROCEDURE — 85025 COMPLETE CBC W/AUTO DIFF WBC: CPT | Performed by: NURSE PRACTITIONER

## 2024-07-13 PROCEDURE — 96365 THER/PROPH/DIAG IV INF INIT: CPT

## 2024-07-13 PROCEDURE — 99285 EMERGENCY DEPT VISIT HI MDM: CPT | Mod: 25

## 2024-07-13 PROCEDURE — 81003 URINALYSIS AUTO W/O SCOPE: CPT | Performed by: NURSE PRACTITIONER

## 2024-07-13 PROCEDURE — 96375 TX/PRO/DX INJ NEW DRUG ADDON: CPT

## 2024-07-13 PROCEDURE — 63600175 PHARM REV CODE 636 W HCPCS: Performed by: SURGERY

## 2024-07-13 PROCEDURE — 80053 COMPREHEN METABOLIC PANEL: CPT | Performed by: NURSE PRACTITIONER

## 2024-07-13 PROCEDURE — U0002 COVID-19 LAB TEST NON-CDC: HCPCS | Performed by: NURSE PRACTITIONER

## 2024-07-13 PROCEDURE — 25000003 PHARM REV CODE 250: Performed by: NURSE PRACTITIONER

## 2024-07-13 PROCEDURE — 25000003 PHARM REV CODE 250: Performed by: SURGERY

## 2024-07-13 RX ORDER — AZITHROMYCIN 250 MG/1
TABLET, FILM COATED ORAL
Qty: 6 TABLET | Refills: 0 | Status: SHIPPED | OUTPATIENT
Start: 2024-07-13 | End: 2024-07-18

## 2024-07-13 RX ORDER — IBUPROFEN 600 MG/1
600 TABLET ORAL
Status: COMPLETED | OUTPATIENT
Start: 2024-07-13 | End: 2024-07-13

## 2024-07-13 RX ADMIN — AZITHROMYCIN MONOHYDRATE 500 MG: 500 INJECTION, POWDER, LYOPHILIZED, FOR SOLUTION INTRAVENOUS at 11:07

## 2024-07-13 RX ADMIN — INSULIN HUMAN 10 UNITS: 100 INJECTION, SOLUTION PARENTERAL at 11:07

## 2024-07-13 RX ADMIN — IBUPROFEN 600 MG: 600 TABLET, FILM COATED ORAL at 09:07

## 2024-07-13 NOTE — Clinical Note
"Michael Montilla (Colby)eneaux was seen and treated in our emergency department on 7/13/2024.  He may return to work on 07/14/2024.       If you have any questions or concerns, please don't hesitate to call.       RN    "

## 2024-07-14 VITALS
WEIGHT: 270 LBS | RESPIRATION RATE: 18 BRPM | TEMPERATURE: 98 F | BODY MASS INDEX: 35.78 KG/M2 | HEIGHT: 73 IN | DIASTOLIC BLOOD PRESSURE: 88 MMHG | OXYGEN SATURATION: 98 % | SYSTOLIC BLOOD PRESSURE: 131 MMHG | HEART RATE: 87 BPM

## 2024-07-14 LAB — POCT GLUCOSE: 219 MG/DL (ref 70–110)

## 2024-07-14 PROCEDURE — 82962 GLUCOSE BLOOD TEST: CPT

## 2024-07-14 NOTE — ED PROVIDER NOTES
Encounter Date: 7/13/2024       History     Chief Complaint   Patient presents with    Headache     Pt presented to ED per POV with c/o headache and cough onset a week ago. Pt was recently discharged from [facility] for upper resp and Sinus infection.     28 yrs old male presents with constant headache and cough x 1 week. Patient reports he was seen at [facility] because they thought he had meningitis, but he did not. Patient reports he had URI with sinus infection. Patient reports he has had a headache since being discharged from hospital that is on and off. Patients mother requested a CT of brain because it has never been done while he was in the hospital and his headaches are getting worse. Patient reports headaches are worse with coughing.       Review of patient's allergies indicates:   Allergen Reactions    Codeine      Past Medical History:   Diagnosis Date    Anxiety     Autism     Diabetes mellitus     Diabetes mellitus, type 2     Hyperlipidemia      Past Surgical History:   Procedure Laterality Date    ADENOIDECTOMY      TONSILLECTOMY      WRIST SURGERY       Family History   Problem Relation Name Age of Onset    Hypertension Mother      Hyperlipidemia Mother      Breast cancer Mother      Anxiety disorder Mother      Depression Mother      Hypertension Father      Kidney failure Father      Heart failure Father      Diabetes type II Father      Rheum arthritis Father      Hyperlipidemia Father       Social History     Tobacco Use    Smoking status: Never    Smokeless tobacco: Never   Substance Use Topics    Alcohol use: Not Currently    Drug use: Never     Review of Systems   Constitutional:  Negative for chills, fatigue and fever.   Respiratory:  Positive for cough. Negative for apnea, choking, chest tightness, shortness of breath, wheezing and stridor.    Gastrointestinal:  Negative for abdominal pain, constipation, diarrhea, nausea and vomiting.   Neurological:  Positive for headaches.  Negative for dizziness, tremors, seizures, syncope, facial asymmetry, speech difficulty, weakness, light-headedness and numbness.   All other systems reviewed and are negative.      Physical Exam     Initial Vitals [07/13/24 1934]   BP Pulse Resp Temp SpO2   127/78 88 18 98 °F (36.7 °C) 99 %      MAP       --         Physical Exam    Nursing note and vitals reviewed.  Constitutional: He appears well-developed and well-nourished.   HENT:   Head: Normocephalic and atraumatic.   Right Ear: External ear normal.   Left Ear: External ear normal.   Nose: Nose normal.   Mouth/Throat: Oropharynx is clear and moist.   Eyes: Conjunctivae and EOM are normal.   Neck: Neck supple.   Normal range of motion.  Cardiovascular:  Normal rate, regular rhythm, normal heart sounds and intact distal pulses.           Pulmonary/Chest: Breath sounds normal.   Abdominal: Abdomen is soft. Bowel sounds are normal.   Musculoskeletal:         General: Normal range of motion.      Cervical back: Normal range of motion and neck supple.     Neurological: He is alert and oriented to person, place, and time. He has normal strength and normal reflexes. He displays normal reflexes. No cranial nerve deficit or sensory deficit. Coordination and gait normal. GCS score is 15. GCS eye subscore is 4. GCS verbal subscore is 5. GCS motor subscore is 6.   Skin: Skin is warm and dry. Capillary refill takes less than 2 seconds.   Psychiatric: He has a normal mood and affect. His behavior is normal. Judgment and thought content normal.         ED Course   Procedures  Labs Reviewed   COMPREHENSIVE METABOLIC PANEL - Abnormal; Notable for the following components:       Result Value    Sodium 130 (*)     Chloride 97 (*)     Glucose 290 (*)     Creatinine 0.45 (*)     Protein Total 8.3 (*)     ALT 63 (*)     BUN/Creatinine Ratio 27 (*)     All other components within normal limits   URINALYSIS, REFLEX TO URINE CULTURE - Abnormal; Notable for the following components:     Glucose, UA >=1000 (*)     Ketones, UA 15 (*)     All other components within normal limits    Narrative:      URINE STABILITY IS 2 HOURS AT ROOM TEMP OR    SIX HOURS REFRIGERATED. PERFORMING TESTING ON    SPECIMENS GREATER THAN THIS AGE MAY AFFECT THE    FOLLOWING TESTS:    PH          SPECIFIC GRAVITY           BLOOD    CLARITY     BILIRUBIN               UROBILINOGEN   CBC WITH DIFFERENTIAL - Abnormal; Notable for the following components:    WBC 13.89 (*)     MPV 8.5 (*)     Lymph # 3.86 (*)     Neut # 8.93 (*)     IG# 0.06 (*)     All other components within normal limits   RAPID INFLUENZA A/B - Normal   SARS-COV-2 RNA AMPLIFICATION, QUAL - Normal    Narrative:     The IDNOW COVID-19 assay is a rapid molecular in vitro diagnostic test utilizing an isothermal nucleic acid amplification technology intended for the qualitative detection of nucleic acid from the SARS-CoV-2 viral RNA in direct nasal, nasopharyngeal or throat swabs from individuals who are suspected of COVID-19 by their healthcare provider.   CBC W/ AUTO DIFFERENTIAL    Narrative:     The following orders were created for panel order CBC auto differential.  Procedure                               Abnormality         Status                     ---------                               -----------         ------                     CBC with Differential[[id number]]       Abnormal            Final result                 Please view results for these tests on the individual orders.          Imaging Results              CT Head Without Contrast (Final result)  Result time 07/13/24 21:36:37      Final result by Adi Bennett MD (07/13/24 21:36:37)                   Impression:        1. No acute intracranial abnormality.    2.  Mild generalized atrophy.    3.  Patent septum pellucidum which represents a normal variant.    n/a    Category: n/a    The following dose reduction techniques are used for all CT at [facility]:    1.    Automated exposure control.    2.   Adjustment of the mA and/or kV according to patient size.    3.   Use of iterative reconstruction technique.      Electronically signed by: Adi Bennett  Date:    07/13/2024  Time:    21:36               Narrative:    EXAMINATION:  CT HEAD WITHOUT CONTRAST    CLINICAL HISTORY:  Headache, chronic, new features or increased frequency;    TECHNIQUE:  Low dose axial images were obtained through the head.  Coronal and sagittal reformations were also performed. Contrast was not administered.    COMPARISON:  None.    FINDINGS:  Multiplanar imaging through the head/brain was performed.Mild generalized atrophy is present.  There is a patent septum pellucidum which represents a normal variant.  I see no intraparynchymal masses, hemorhagic lesions, or dominant wedge shaped infarcts. I see no extraxial masses or abnormal fluid collections.                                       X-Ray Chest PA And Lateral (Final result)  Result time 07/13/24 21:39:21      Final result by Adi Bennett MD (07/13/24 21:39:21)                   Impression:      No acute abnormality.      Electronically signed by: Adi Bennett  Date:    07/13/2024  Time:    21:39               Narrative:    EXAMINATION:  XR CHEST PA AND LATERAL    CLINICAL HISTORY:  Cough, unspecified    TECHNIQUE:  PA and lateral views of the chest was performed.    COMPARISON:  07/02/2024    FINDINGS:  The lungs are clear, with normal appearance of pulmonary vasculature and no pleural effusion or pneumothorax.    The cardiac silhouette is normal in size. The hilar and mediastinal contours are unremarkable.    There is mild kyphoscoliosis which appears chronic.                                       Medications   lactated ringers bolus 1,000 mL (has no administration in time range)   insulin regular injection 10 Units 0.1 mL (has no administration in time range)   azithromycin (ZITHROMAX) 500 mg in D5W 250 mL IVPB (Vial-Mate) (has no  administration in time range)   ibuprofen tablet 600 mg (600 mg Oral Given 7/13/24 2117)     Medical Decision Making  Amount and/or Complexity of Data Reviewed  Labs: ordered.  Radiology: ordered.    Risk  Prescription drug management.               ED Course as of 07/13/24 2316   Sat Jul 13, 2024 2208 Report given to Dr. Crews [BB]      ED Course User Index  [BB] Sheng Bradford FNP                       PATIENT STATES THAT HEADACHE ONLY OCCURS WHEN COUGHING.  +COUGH WORSE IN AM W/ MARKED EXPECTORANT AT THAT TIME - GREEN.  T1DM SINCE AGE 14 W/OUT DIETARY COMPLIANCE.  NO NV.  NO WHEEZING.  NEURO NONFOCAL.      Clinical Impression:  Final diagnoses:  [R05.9] Cough (Primary)  [R82.4] Ketonuria  [E10.65] Uncontrolled type 1 diabetes mellitus with hyperglycemia  [J15.7] Pneumonia of both lungs due to Mycoplasma pneumoniae, unspecified part of lung          ED Disposition Condition    Discharge Stable          ED Prescriptions       Medication Sig Dispense Start Date End Date Auth. Provider    azithromycin (Z-KRANTHI) 250 MG tablet Take 2 tablets by mouth on day 1; Take 1 tablet by mouth on days 2-5 6 tablet 7/13/2024 7/18/2024 Preston Crews MD          Follow-up Information       Follow up With Specialties Details Why Contact Info    [facility] FNP Family Medicine Schedule an appointment as soon as possible for a visit   [address] [place] [address]  [phone]               Preston Crews MD  07/13/24 2319

## 2024-07-14 NOTE — ED NOTES
Difficulty in obtaining IV. Dr Crews to bedside to talk to patient to explain why it is important and to let the patient make an informed decision.

## 2024-07-14 NOTE — DISCHARGE INSTRUCTIONS
FOLLOW UP WITH PERSONAL PHYSICIAN AM MONDAY.  RETURN TO ER IF SYMPTOMS INCREASE OR IF NEW SYMPTOMS DEVELOP.  STOP EATING DORITOS.

## 2024-07-16 ENCOUNTER — OFFICE VISIT (OUTPATIENT)
Dept: FAMILY MEDICINE | Facility: CLINIC | Age: 29
End: 2024-07-16
Payer: MEDICARE

## 2024-07-16 VITALS
HEIGHT: 73 IN | DIASTOLIC BLOOD PRESSURE: 74 MMHG | HEART RATE: 79 BPM | OXYGEN SATURATION: 97 % | SYSTOLIC BLOOD PRESSURE: 120 MMHG | WEIGHT: 274 LBS | TEMPERATURE: 97 F | BODY MASS INDEX: 36.31 KG/M2

## 2024-07-16 DIAGNOSIS — J03.90 TONSILLITIS: ICD-10-CM

## 2024-07-16 DIAGNOSIS — J45.20 MILD INTERMITTENT ASTHMA WITHOUT COMPLICATION: ICD-10-CM

## 2024-07-16 DIAGNOSIS — E11.65 TYPE 2 DIABETES MELLITUS WITH HYPERGLYCEMIA, WITH LONG-TERM CURRENT USE OF INSULIN: Primary | ICD-10-CM

## 2024-07-16 DIAGNOSIS — Z79.4 TYPE 2 DIABETES MELLITUS WITH HYPERGLYCEMIA, WITH LONG-TERM CURRENT USE OF INSULIN: Primary | ICD-10-CM

## 2024-07-16 PROCEDURE — 1159F MED LIST DOCD IN RCRD: CPT | Mod: CPTII,,, | Performed by: NURSE PRACTITIONER

## 2024-07-16 PROCEDURE — 99214 OFFICE O/P EST MOD 30 MIN: CPT | Mod: ,,, | Performed by: NURSE PRACTITIONER

## 2024-07-16 PROCEDURE — 1160F RVW MEDS BY RX/DR IN RCRD: CPT | Mod: CPTII,,, | Performed by: NURSE PRACTITIONER

## 2024-07-16 PROCEDURE — 3078F DIAST BP <80 MM HG: CPT | Mod: CPTII,,, | Performed by: NURSE PRACTITIONER

## 2024-07-16 PROCEDURE — 3074F SYST BP LT 130 MM HG: CPT | Mod: CPTII,,, | Performed by: NURSE PRACTITIONER

## 2024-07-16 PROCEDURE — 4010F ACE/ARB THERAPY RXD/TAKEN: CPT | Mod: CPTII,,, | Performed by: NURSE PRACTITIONER

## 2024-07-16 PROCEDURE — 3046F HEMOGLOBIN A1C LEVEL >9.0%: CPT | Mod: CPTII,,, | Performed by: NURSE PRACTITIONER

## 2024-07-16 PROCEDURE — 3008F BODY MASS INDEX DOCD: CPT | Mod: CPTII,,, | Performed by: NURSE PRACTITIONER

## 2024-07-16 RX ORDER — METFORMIN HYDROCHLORIDE 1000 MG/1
1000 TABLET ORAL
Qty: 90 TABLET | Refills: 3 | Status: SHIPPED | OUTPATIENT
Start: 2024-07-16 | End: 2025-07-16

## 2024-07-16 RX ORDER — ALBUTEROL SULFATE 90 UG/1
2 AEROSOL, METERED RESPIRATORY (INHALATION) EVERY 6 HOURS PRN
Qty: 18 G | Refills: 11 | Status: SHIPPED | OUTPATIENT
Start: 2024-07-16

## 2024-07-16 RX ORDER — ALBUTEROL SULFATE 0.83 MG/ML
2.5 SOLUTION RESPIRATORY (INHALATION) EVERY 6 HOURS PRN
Qty: 100 EACH | Refills: 0 | Status: SHIPPED | OUTPATIENT
Start: 2024-07-16 | End: 2025-07-16

## 2024-07-16 RX ORDER — SAXAGLIPTIN 5 MG/1
5 TABLET, FILM COATED ORAL DAILY
Qty: 90 TABLET | Refills: 3 | Status: SHIPPED | OUTPATIENT
Start: 2024-07-16 | End: 2025-07-16

## 2024-07-16 RX ORDER — DULAGLUTIDE 4.5 MG/.5ML
4.5 INJECTION, SOLUTION SUBCUTANEOUS
Qty: 12 PEN | Refills: 3 | Status: SHIPPED | OUTPATIENT
Start: 2024-07-16 | End: 2025-07-16

## 2024-07-16 RX ORDER — INSULIN GLARGINE 300 U/ML
60 INJECTION, SOLUTION SUBCUTANEOUS DAILY
Qty: 18 ML | Refills: 3 | Status: SHIPPED | OUTPATIENT
Start: 2024-07-16 | End: 2025-07-16

## 2024-07-16 NOTE — ASSESSMENT & PLAN NOTE
Refill all medications for diabetes and continue as prescribed  Blood sugar log for review at next visit   On ACE and Statin according to guidelines.  A1c remains elevated  Counseled on diabetic diet and effects of poorly controlled diabetes. Avoid soda, simple sweets, and limit rice/pasta/breads/starches.  Maintain healthy weight with goal BMI <30.  Exercise 5 times per week for 30 minutes per day.  Stressed importance of daily foot exams.  Educated on importance of annual dilated eye exam.  Instructed to resume follow-up with Endocrinology

## 2024-07-16 NOTE — ASSESSMENT & PLAN NOTE
Resume albuterol nebulizer treatments t.i.d. for the next few days then may decrease frequency to as needed

## 2024-07-16 NOTE — PROGRESS NOTES
Patient ID: Michael Malone  : 1995    Chief Complaint: Hospital Follow Up (Admitted on 7/3/24 /ER visit  for cough and headache ) and Cough (Wet cough. Clear/green mucus.)    Allergies: Patient is allergic to codeine.     History of Present Illness:  The patient is a 28 y.o. Black or  male who presents to clinic for follow up on Hospital Follow Up (Admitted on 7/3/24 /ER visit  for cough and headache ) and Cough (Wet cough. Clear/green mucus.)     He was hospitalized for 2 days at [facility] for sepsis secondary to tonsillitis and sepsis.  Encephalitis was ruled out via spinal tap.  He complained of cough, headache, and neck pain. On admission white blood cell count was 23, sodium 133, chloride 96.  Chest x-ray, blood cultures, and urinalysis were negative.  HIV, hepatitis panel negative.  He was discharged home with Augmentin.  About 1 week after discharge, he returned to ED with complaints of worsening cough.  Chest x-ray was performed and negative.  CT brain was performed without acute findings and headaches have resolved.  Today, he reports that his cough has continued to improve but is occurs often during the night. He has history of asthma but has been out of rescue inhaler and nebulizer treatments. He is currently on day 3 of Z-pack.     Hemoglobin A1c was elevated at 10.1 but lost to follow-up with Endocrinology. He is no longer checking his blood sugars on a daily basis.  He also admits that he skips a few days of insulin per week.  His last dose of Trulicity was 2 weeks ago.  Noncompliant with diet.    Social History:  reports that he has never smoked. He has never used smokeless tobacco. He reports that he does not currently use alcohol. He reports that he does not use drugs.    Past Medical History:  has a past medical history of Anxiety, Autism, Diabetes mellitus, Diabetes mellitus, type 2, and Hyperlipidemia.    Current Medications:  Current Outpatient Medications  "  Medication Instructions    albuterol (PROVENTIL) 2.5 mg, Nebulization, Every 6 hours PRN, Rescue    albuterol (VENTOLIN HFA) 90 mcg/actuation inhaler 2 puffs, Inhalation, Every 6 hours PRN, Rescue    atorvastatin (LIPITOR) 10 mg, Oral, Daily    azithromycin (Z-KRANTHI) 250 MG tablet Take 2 tablets by mouth on day 1; Take 1 tablet by mouth on days 2-5<BR>    citalopram (CELEXA) 10 mg, Oral, Daily    clonazePAM (KLONOPIN) 1 mg, Daily    donepeziL (ARICEPT) 10 mg, Oral, Daily    FARXIGA 10 mg, Oral, Daily    lisinopriL (PRINIVIL,ZESTRIL) 20 mg, Oral, Daily    metFORMIN (GLUCOPHAGE) 1,000 mg, Oral, With breakfast    montelukast (SINGULAIR) 5 mg, Oral, Daily    SAXagliptin (ONGLYZA) 5 mg, Oral, Daily    TOUJEO SOLOSTAR U-300 INSULIN 60 Units, Subcutaneous, Daily    traZODone (DESYREL) 75 mg, Oral, Nightly PRN    TRULICITY 4.5 mg, Subcutaneous, Every 7 days    VASCEPA 2,000 mg, Oral, Nightly       ROS: See HPI    Visit Vitals  /74   Pulse 79   Temp 97 °F (36.1 °C)   Ht 6' 1" (1.854 m)   Wt 124.3 kg (274 lb)   SpO2 97%   BMI 36.15 kg/m²       Physical Exam  Vitals reviewed.   Constitutional:       Appearance: Normal appearance. He is obese.   HENT:      Right Ear: Tympanic membrane, ear canal and external ear normal.      Left Ear: Tympanic membrane, ear canal and external ear normal.      Mouth/Throat:      Pharynx: No oropharyngeal exudate or posterior oropharyngeal erythema.   Cardiovascular:      Rate and Rhythm: Normal rate and regular rhythm.      Heart sounds: Normal heart sounds.   Pulmonary:      Effort: Pulmonary effort is normal.      Breath sounds: Normal breath sounds.   Musculoskeletal:      Cervical back: Normal range of motion and neck supple. No tenderness.   Lymphadenopathy:      Cervical: No cervical adenopathy.   Skin:     General: Skin is warm and dry.   Neurological:      Mental Status: He is alert and oriented to person, place, and time. Mental status is at baseline.          Labs " Reviewed:  Chemistry:  Lab Results   Component Value Date     (L) 07/13/2024    K 4.3 07/13/2024    BUN 12 07/13/2024    CREATININE 0.45 (L) 07/13/2024    EGFRNORACEVR >90 07/13/2024    GLUCOSE 290 (H) 07/13/2024    CALCIUM 10.1 07/13/2024    ALKPHOS 137 07/13/2024    LABPROT 8.3 (H) 07/13/2024    ALBUMIN 4.6 07/13/2024    AST 59 07/13/2024    ALT 63 (H) 07/13/2024    MG 1.60 07/05/2024    PHOS 2.2 (L) 07/03/2024    TSH 1.240 03/02/2024        Lab Results   Component Value Date    HGBA1C 10.1 (H) 07/03/2024        Hematology:  Lab Results   Component Value Date    WBC 13.89 (H) 07/13/2024    RBC 4.94 07/13/2024    HGB 14.7 07/13/2024    HCT 41.0 07/13/2024    MCV 83.0 07/13/2024    MCH 29.8 07/13/2024    MCHC 35.9 07/13/2024    RDW 12.6 07/13/2024     07/13/2024    MPV 8.5 (L) 07/13/2024       Lipid Panel:  Lab Results   Component Value Date    CHOL 99 03/02/2024    HDL 32 (L) 03/02/2024    TRIG 115 03/02/2024        Assessment & Plan:  1. Type 2 diabetes mellitus with hyperglycemia, with long-term current use of insulin  Overview:  On Farxiga, metformin, saxagliptin, Trulicity, Toujeo per Endocrinology    Assessment & Plan:  Refill all medications for diabetes and continue as prescribed  Blood sugar log for review at next visit   On ACE and Statin according to guidelines.  A1c remains elevated  Counseled on diabetic diet and effects of poorly controlled diabetes. Avoid soda, simple sweets, and limit rice/pasta/breads/starches.  Maintain healthy weight with goal BMI <30.  Exercise 5 times per week for 30 minutes per day.  Stressed importance of daily foot exams.  Educated on importance of annual dilated eye exam.  Instructed to resume follow-up with Endocrinology      Orders:  -     TOUJEO SOLOSTAR U-300 INSULIN 300 unit/mL (1.5 mL) InPn pen; Inject 60 Units into the skin once daily.  Dispense: 18 mL; Refill: 3  -     TRULICITY 4.5 mg/0.5 mL pen injector; Inject 4.5 mg into the skin every 7 days.   Dispense: 12 pen ; Refill: 3  -     metFORMIN (GLUCOPHAGE) 1000 MG tablet; Take 1 tablet (1,000 mg total) by mouth daily with breakfast.  Dispense: 90 tablet; Refill: 3  -     SAXagliptin (ONGLYZA) 5 mg Tab tablet; Take 1 tablet (5 mg total) by mouth once daily.  Dispense: 90 tablet; Refill: 3    2. Mild intermittent asthma without complication  Overview:  On montelukast    Assessment & Plan:  Resume albuterol nebulizer treatments t.i.d. for the next few days then may decrease frequency to as needed    Orders:  -     albuterol (VENTOLIN HFA) 90 mcg/actuation inhaler; Inhale 2 puffs into the lungs every 6 (six) hours as needed for Wheezing or Shortness of Breath. Rescue  Dispense: 18 g; Refill: 11  -     albuterol (PROVENTIL) 2.5 mg /3 mL (0.083 %) nebulizer solution; Take 3 mLs (2.5 mg total) by nebulization every 6 (six) hours as needed for Wheezing. Rescue  Dispense: 100 each; Refill: 0    3. Tonsillitis  Comments:  Resolved  Hospitalization records reviewed         Future Appointments   Date Time Provider Department Center   7/30/2024 10:30 AM Maribell Witt FNP [facility] SARAH Haney   9/6/2024  8:50 AM LAB, [facility] LABORATORY DRAW STATION [facility] JOEL Phipps   9/18/2024 10:00 [facility]       Follow up in about 2 weeks (around 7/30/2024) for DM . Call sooner if needed.    STACEY Duran    Lab Frequency Next Occurrence   Vitamin D Once 07/24/2024   CBC Auto Differential Once 09/13/2024   Comprehensive Metabolic Panel Once 09/13/2024   Lipid Panel Once 09/13/2024   Hemoglobin A1C Once 09/13/2024   TSH Once 09/13/2024   Ambulatory referral/consult to Neurology Once 06/27/2024

## 2024-07-30 ENCOUNTER — OFFICE VISIT (OUTPATIENT)
Dept: FAMILY MEDICINE | Facility: CLINIC | Age: 29
End: 2024-07-30
Payer: MEDICARE

## 2024-07-30 VITALS
WEIGHT: 274 LBS | OXYGEN SATURATION: 99 % | DIASTOLIC BLOOD PRESSURE: 86 MMHG | TEMPERATURE: 97 F | SYSTOLIC BLOOD PRESSURE: 122 MMHG | HEART RATE: 82 BPM | BODY MASS INDEX: 36.31 KG/M2 | HEIGHT: 73 IN

## 2024-07-30 DIAGNOSIS — Z79.4 TYPE 2 DIABETES MELLITUS WITH HYPERGLYCEMIA, WITH LONG-TERM CURRENT USE OF INSULIN: ICD-10-CM

## 2024-07-30 DIAGNOSIS — E11.65 TYPE 2 DIABETES MELLITUS WITH HYPERGLYCEMIA, WITH LONG-TERM CURRENT USE OF INSULIN: Primary | ICD-10-CM

## 2024-07-30 DIAGNOSIS — J45.20 MILD INTERMITTENT ASTHMA WITHOUT COMPLICATION: ICD-10-CM

## 2024-07-30 DIAGNOSIS — E11.65 TYPE 2 DIABETES MELLITUS WITH HYPERGLYCEMIA, WITH LONG-TERM CURRENT USE OF INSULIN: ICD-10-CM

## 2024-07-30 DIAGNOSIS — Z79.4 TYPE 2 DIABETES MELLITUS WITH HYPERGLYCEMIA, WITH LONG-TERM CURRENT USE OF INSULIN: Primary | ICD-10-CM

## 2024-07-30 DIAGNOSIS — E66.01 MORBID (SEVERE) OBESITY DUE TO EXCESS CALORIES: ICD-10-CM

## 2024-07-30 LAB — GLUCOSE SERPL-MCNC: 111 MG/DL (ref 70–110)

## 2024-07-30 PROCEDURE — 82962 GLUCOSE BLOOD TEST: CPT | Mod: ,,, | Performed by: NURSE PRACTITIONER

## 2024-07-30 PROCEDURE — 3079F DIAST BP 80-89 MM HG: CPT | Mod: CPTII,,, | Performed by: NURSE PRACTITIONER

## 2024-07-30 PROCEDURE — 1160F RVW MEDS BY RX/DR IN RCRD: CPT | Mod: CPTII,,, | Performed by: NURSE PRACTITIONER

## 2024-07-30 PROCEDURE — 99214 OFFICE O/P EST MOD 30 MIN: CPT | Mod: ,,, | Performed by: NURSE PRACTITIONER

## 2024-07-30 PROCEDURE — 3008F BODY MASS INDEX DOCD: CPT | Mod: CPTII,,, | Performed by: NURSE PRACTITIONER

## 2024-07-30 PROCEDURE — 1159F MED LIST DOCD IN RCRD: CPT | Mod: CPTII,,, | Performed by: NURSE PRACTITIONER

## 2024-07-30 PROCEDURE — 3074F SYST BP LT 130 MM HG: CPT | Mod: CPTII,,, | Performed by: NURSE PRACTITIONER

## 2024-07-30 PROCEDURE — 3046F HEMOGLOBIN A1C LEVEL >9.0%: CPT | Mod: CPTII,,, | Performed by: NURSE PRACTITIONER

## 2024-07-30 PROCEDURE — 4010F ACE/ARB THERAPY RXD/TAKEN: CPT | Mod: CPTII,,, | Performed by: NURSE PRACTITIONER

## 2024-07-30 RX ORDER — PIOGLITAZONEHYDROCHLORIDE 30 MG/1
30 TABLET ORAL DAILY
COMMUNITY
Start: 2024-07-17

## 2024-07-30 RX ORDER — ALBUTEROL SULFATE 0.83 MG/ML
2.5 SOLUTION RESPIRATORY (INHALATION) EVERY 6 HOURS PRN
Qty: 100 EACH | Refills: 0 | Status: SHIPPED | OUTPATIENT
Start: 2024-07-30 | End: 2025-07-30

## 2024-07-30 RX ORDER — MONTELUKAST SODIUM 10 MG/1
10 TABLET ORAL DAILY
COMMUNITY
Start: 2024-07-17

## 2024-07-30 NOTE — PROGRESS NOTES
Patient ID: Michael Malone  : 1995    Chief Complaint: Follow-up    Allergies: Patient is allergic to codeine.     History of Present Illness:  The patient is a 28 y.o. Black or  male who presents to clinic for follow up on Follow-up. Blood sugar in office was 111. He ate toast with butter for breakfast around 6 a.m. He and mother report improved compliance with all medications including diabetic medications.  He reestablished care with Endocrinology for his diabetes.  Mother is unsure of what medication adjustments were made.    Mother is requesting a refill on his albuterol nebulizer treatments.    Social History:  reports that he has never smoked. He has never used smokeless tobacco. He reports that he does not currently use alcohol. He reports that he does not use drugs.    Past Medical History:  has a past medical history of Anxiety, Autism, Diabetes mellitus, Diabetes mellitus, type 2, and Hyperlipidemia.    Current Medications:  Current Outpatient Medications   Medication Instructions    albuterol (PROVENTIL) 2.5 mg, Nebulization, Every 6 hours PRN, Rescue    albuterol (VENTOLIN HFA) 90 mcg/actuation inhaler 2 puffs, Inhalation, Every 6 hours PRN, Rescue    atorvastatin (LIPITOR) 10 mg, Oral, Daily    citalopram (CELEXA) 10 mg, Oral, Daily    clonazePAM (KLONOPIN) 1 mg, Daily    donepeziL (ARICEPT) 10 mg, Oral, Daily    FARXIGA 10 mg, Oral, Daily    lisinopriL (PRINIVIL,ZESTRIL) 20 mg, Oral, Daily    metFORMIN (GLUCOPHAGE) 1,000 mg, Oral, With breakfast    montelukast (SINGULAIR) 5 mg, Oral, Daily    montelukast (SINGULAIR) 10 mg, Oral, Daily    pioglitazone (ACTOS) 30 mg, Oral, Daily    SAXagliptin (ONGLYZA) 5 mg, Oral, Daily    TOUJEO SOLOSTAR U-300 INSULIN 60 Units, Subcutaneous, Daily    traZODone (DESYREL) 75 mg, Oral, Nightly PRN    TRULICITY 4.5 mg, Subcutaneous, Every 7 days    VASCEPA 2,000 mg, Oral, Nightly       ROS: See HPI    Visit Vitals  /86   Pulse 82   Temp  "97.1 °F (36.2 °C)   Ht 6' 1" (1.854 m)   Wt 124.3 kg (274 lb)   SpO2 99%   BMI 36.15 kg/m²       Physical Exam  Vitals reviewed.   Constitutional:       Appearance: Normal appearance. He is obese.   Cardiovascular:      Rate and Rhythm: Normal rate and regular rhythm.      Heart sounds: Normal heart sounds.   Pulmonary:      Effort: Pulmonary effort is normal.      Breath sounds: Normal breath sounds.   Skin:     General: Skin is warm and dry.   Neurological:      Mental Status: He is alert and oriented to person, place, and time. Mental status is at baseline.          Labs Reviewed:  Chemistry:  Lab Results   Component Value Date     (L) 07/13/2024    K 4.3 07/13/2024    BUN 12 07/13/2024    CREATININE 0.45 (L) 07/13/2024    EGFRNORACEVR >90 07/13/2024    GLUCOSE 290 (H) 07/13/2024    CALCIUM 10.1 07/13/2024    ALKPHOS 137 07/13/2024    LABPROT 8.3 (H) 07/13/2024    ALBUMIN 4.6 07/13/2024    AST 59 07/13/2024    ALT 63 (H) 07/13/2024    MG 1.60 07/05/2024    PHOS 2.2 (L) 07/03/2024    TSH 1.240 03/02/2024        Lab Results   Component Value Date    HGBA1C 10.1 (H) 07/03/2024        Hematology:  Lab Results   Component Value Date    WBC 13.89 (H) 07/13/2024    RBC 4.94 07/13/2024    HGB 14.7 07/13/2024    HCT 41.0 07/13/2024    MCV 83.0 07/13/2024    MCH 29.8 07/13/2024    MCHC 35.9 07/13/2024    RDW 12.6 07/13/2024     07/13/2024    MPV 8.5 (L) 07/13/2024       Lipid Panel:  Lab Results   Component Value Date    CHOL 99 03/02/2024    HDL 32 (L) 03/02/2024    TRIG 115 03/02/2024        Assessment & Plan:  1. Type 2 diabetes mellitus with hyperglycemia, with long-term current use of insulin  Overview:  On Farxiga, metformin, saxagliptin, Trulicity, Toujeo per Endocrinology    Assessment & Plan:  Lab Results   Component Value Date    HGBA1C 10.1 (H) 07/03/2024    HGBA1C 9.7 (H) 03/02/2024      Continue current medications; obtain records from [facility]  Readings are improving per report; forgot " blood sugar log  On ACE and Statin according to guidelines  Follow ADA Diet. Avoid soda, simple sweets, and limit rice/pasta/breads/starches  Maintain healthy weight with goal BMI <30.  Exercise 5 times per week for 30 minutes per day  Stressed importance of daily foot exams  Educated on importance of annual dilated eye exam         Orders:  -     POCT Glucose, Hand-Held Device    2. Mild intermittent asthma without complication  Overview:  On montelukast and albuterol as needed    Assessment & Plan:  Refill albuterol nebulizer treatments as needed    Orders:  -     albuterol (PROVENTIL) 2.5 mg /3 mL (0.083 %) nebulizer solution; Take 3 mLs (2.5 mg total) by nebulization every 6 (six) hours as needed for Wheezing. Rescue  Dispense: 100 each; Refill: 0    3. Morbid (severe) obesity due to excess calories  Overview:  Body mass index is 36.68 kg/m².  Goal BMI <30.  Exercise 5 times a week for 30 minutes per day.  Avoid soda, simple sugars, excessive rice, potatoes or bread. Limit fast foods and fried foods.  Choose complex carbs in moderation (example: green vegetables, beans, oatmeal). Eat plenty of fresh fruits and vegetables with lean meats daily.  Eat a balanced portion size.  Consider permanent healthy life style changes.             Future Appointments   Date Time Provider Department Center   10/22/2024  8:00 AM LAB, [facility] LABORATORY DRAW STATION [facility] LABDS [place]   10/30/2024  2:30 PM Maribell Witt FNP [facility] SARAH [facility]       Follow up in about 3 months (around 10/30/2024) for Medicare Wellness, With Fasting Labs Prior. Call sooner if needed.    STACEY Duran    Lab Frequency Next Occurrence   Vitamin D Once 07/24/2024   CBC Auto Differential Once 09/13/2024   Comprehensive Metabolic Panel Once 09/13/2024   Lipid Panel Once 09/13/2024   Hemoglobin A1C Once 09/13/2024   TSH Once 09/13/2024   Ambulatory referral/consult to Neurology Once 06/27/2024

## 2024-07-30 NOTE — ASSESSMENT & PLAN NOTE
Lab Results   Component Value Date    HGBA1C 10.1 (H) 07/03/2024    HGBA1C 9.7 (H) 03/02/2024      Continue current medications; obtain records from [facility]  Readings are improving per report; forgot blood sugar log  On ACE and Statin according to guidelines  Follow ADA Diet. Avoid soda, simple sweets, and limit rice/pasta/breads/starches  Maintain healthy weight with goal BMI <30.  Exercise 5 times per week for 30 minutes per day  Stressed importance of daily foot exams  Educated on importance of annual dilated eye exam

## 2024-07-31 RX ORDER — SAXAGLIPTIN 5 MG/1
5 TABLET, FILM COATED ORAL DAILY
Qty: 90 TABLET | Refills: 3 | Status: SHIPPED | OUTPATIENT
Start: 2024-07-31 | End: 2025-07-31

## 2024-08-06 LAB
LEFT EYE DM RETINOPATHY: NEGATIVE
RIGHT EYE DM RETINOPATHY: NEGATIVE

## 2024-08-28 ENCOUNTER — PATIENT MESSAGE (OUTPATIENT)
Dept: FAMILY MEDICINE | Facility: CLINIC | Age: 29
End: 2024-08-28
Payer: MEDICARE

## 2024-08-28 DIAGNOSIS — E11.65 TYPE 2 DIABETES MELLITUS WITH HYPERGLYCEMIA, WITH LONG-TERM CURRENT USE OF INSULIN: ICD-10-CM

## 2024-08-28 DIAGNOSIS — Z79.4 TYPE 2 DIABETES MELLITUS WITH HYPERGLYCEMIA, WITH LONG-TERM CURRENT USE OF INSULIN: ICD-10-CM

## 2024-08-28 RX ORDER — METFORMIN HYDROCHLORIDE 1000 MG/1
1000 TABLET ORAL
Qty: 90 TABLET | Refills: 3 | Status: SHIPPED | OUTPATIENT
Start: 2024-08-28 | End: 2025-08-28

## 2024-08-30 ENCOUNTER — TELEPHONE (OUTPATIENT)
Dept: FAMILY MEDICINE | Facility: CLINIC | Age: 29
End: 2024-08-30
Payer: MEDICARE

## 2024-08-30 DIAGNOSIS — F84.0 AUTISM: Primary | ICD-10-CM

## 2024-08-30 DIAGNOSIS — J30.2 SEASONAL ALLERGIES: ICD-10-CM

## 2024-08-30 RX ORDER — DONEPEZIL HYDROCHLORIDE 10 MG/1
10 TABLET, FILM COATED ORAL DAILY
Qty: 90 TABLET | Refills: 3 | Status: SHIPPED | OUTPATIENT
Start: 2024-08-30

## 2024-09-14 ENCOUNTER — HOSPITAL ENCOUNTER (EMERGENCY)
Facility: HOSPITAL | Age: 29
Discharge: HOME | End: 2024-09-14
Payer: MEDICARE

## 2024-09-14 VITALS
RESPIRATION RATE: 16 BRPM | SYSTOLIC BLOOD PRESSURE: 135 MMHG | HEART RATE: 80 BPM | OXYGEN SATURATION: 98 % | HEIGHT: 73 IN | BODY MASS INDEX: 36.45 KG/M2 | DIASTOLIC BLOOD PRESSURE: 82 MMHG | WEIGHT: 275 LBS | TEMPERATURE: 98 F

## 2024-09-14 DIAGNOSIS — B34.9 VIRAL SYNDROME: Primary | ICD-10-CM

## 2024-09-14 DIAGNOSIS — R05.9 COUGH: ICD-10-CM

## 2024-09-14 PROCEDURE — U0002 COVID-19 LAB TEST NON-CDC: HCPCS | Performed by: NURSE PRACTITIONER

## 2024-09-14 PROCEDURE — 99283 EMERGENCY DEPT VISIT LOW MDM: CPT | Mod: 25

## 2024-09-14 PROCEDURE — 87400 INFLUENZA A/B EACH AG IA: CPT | Performed by: NURSE PRACTITIONER

## 2024-09-14 PROCEDURE — 87651 STREP A DNA AMP PROBE: CPT | Performed by: NURSE PRACTITIONER

## 2024-09-14 NOTE — ED PROVIDER NOTES
Encounter Date: 9/14/2024       History     Chief Complaint   Patient presents with    Sore Throat    Cough     Reports that he has been coughing and has had a sore throat for the last 4 days. Pt has a a dry nonproductive cough in triage. Reports coworkers have been sick.      29 year old male presents with cough, sore throat and nasal congestion x4 days. Denies fever.    The history is provided by the patient. No  was used.     Review of patient's allergies indicates:   Allergen Reactions    Codeine      Past Medical History:   Diagnosis Date    Anxiety     Autism     Diabetes mellitus     Diabetes mellitus, type 2     Hyperlipidemia      Past Surgical History:   Procedure Laterality Date    ADENOIDECTOMY      TONSILLECTOMY      WRIST SURGERY       Family History   Problem Relation Name Age of Onset    Hypertension Mother      Hyperlipidemia Mother      Breast cancer Mother      Anxiety disorder Mother      Depression Mother      Hypertension Father      Kidney failure Father      Heart failure Father      Diabetes type II Father      Rheum arthritis Father      Hyperlipidemia Father       Social History     Tobacco Use    Smoking status: Never    Smokeless tobacco: Never   Substance Use Topics    Alcohol use: Not Currently    Drug use: Never     Review of Systems   HENT:  Positive for congestion, rhinorrhea and sore throat.    Respiratory:  Positive for cough.    All other systems reviewed and are negative.      Physical Exam     Initial Vitals [09/14/24 1452]   BP Pulse Resp Temp SpO2   135/82 80 16 98 °F (36.7 °C) 98 %      MAP       --         Physical Exam    Nursing note and vitals reviewed.  Constitutional: He appears well-developed and well-nourished.   HENT:   Head: Normocephalic and atraumatic.   Right Ear: Hearing, tympanic membrane, external ear and ear canal normal.   Left Ear: Hearing, tympanic membrane, external ear and ear canal normal.   Mouth/Throat: Uvula is midline,  oropharynx is clear and moist and mucous membranes are normal.   Eyes: EOM are normal. Pupils are equal, round, and reactive to light.   Neck: Neck supple.   Normal range of motion.  Cardiovascular:  Normal rate, regular rhythm, normal heart sounds and intact distal pulses.           Pulmonary/Chest: Effort normal. He has wheezes in the right lower field and the left lower field.   Abdominal: Abdomen is soft. Bowel sounds are normal.   Musculoskeletal:         General: Normal range of motion.      Cervical back: Normal range of motion and neck supple.     Neurological: He is alert and oriented to person, place, and time. He has normal strength.   Skin: Skin is warm and dry. Capillary refill takes less than 2 seconds.   Psychiatric: He has a normal mood and affect. His behavior is normal. Judgment and thought content normal.         ED Course   Procedures  Labs Reviewed   THROAT SCREEN, RAPID STREP - Normal       Result Value    Rapid Group A Strep Negative     SARS-COV-2 RNA AMPLIFICATION, QUAL - Normal    SARS COV-2 Molecular Negative      Narrative:     The IDNOW COVID-19 assay is a rapid molecular in vitro diagnostic test utilizing an isothermal nucleic acid amplification technology intended for the qualitative detection of nucleic acid from the SARS-CoV-2 viral RNA in direct nasal, nasopharyngeal or throat swabs from individuals who are suspected of COVID-19 by their healthcare provider.   RAPID INFLUENZA A/B          Imaging Results              X-Ray Chest PA And Lateral (In process)                      Medications - No data to display  Medical Decision Making  Problems Addressed:  Cough: acute illness or injury  Viral syndrome: acute illness or injury                                      Clinical Impression:  Final diagnoses:  [R05.9] Cough  [B34.9] Viral syndrome (Primary)          ED Disposition Condition    Discharge Stable          ED Prescriptions    None       Follow-up Information       Follow up With  Specialties Details Why Contact Info    [facility] FNP Family Medicine In 3 days If symptoms worsen [address] [place] [address]  [phone]               Radha Bond FNP  09/14/24 3085

## 2024-09-14 NOTE — Clinical Note
"Michael Pryorby" Faisal was seen and treated in our emergency department on 9/14/2024.  He may return to work on 09/17/2024.       If you have any questions or concerns, please don't hesitate to call.      Radha Bond, STACEY"

## 2024-09-20 DIAGNOSIS — E78.2 MIXED HYPERLIPIDEMIA: ICD-10-CM

## 2024-09-23 RX ORDER — ICOSAPENT ETHYL 1000 MG/1
2 CAPSULE ORAL NIGHTLY
Qty: 60 CAPSULE | Refills: 5 | Status: SHIPPED | OUTPATIENT
Start: 2024-09-23

## 2024-10-14 DIAGNOSIS — F32.A DEPRESSION, UNSPECIFIED DEPRESSION TYPE: ICD-10-CM

## 2024-10-14 RX ORDER — CITALOPRAM 10 MG/1
10 TABLET ORAL DAILY
Qty: 90 TABLET | Refills: 3 | Status: CANCELLED | OUTPATIENT
Start: 2024-10-14

## 2024-10-14 NOTE — TELEPHONE ENCOUNTER
"Pts mom states the psych pt see's in [facility] does not prescribe any medications, pt did not go to work today because he "couldn't get his head right", he is dealing with anxiety and needs medication. He has not seen the provider that last prescribed the clonazepam in a year and a half. Please advise  "

## 2024-10-18 DIAGNOSIS — J45.20 MILD INTERMITTENT ASTHMA WITHOUT COMPLICATION: ICD-10-CM

## 2024-10-18 RX ORDER — ALBUTEROL SULFATE 0.83 MG/ML
2.5 SOLUTION RESPIRATORY (INHALATION) EVERY 6 HOURS PRN
Qty: 300 ML | Refills: 0 | Status: SHIPPED | OUTPATIENT
Start: 2024-10-18 | End: 2025-10-18

## 2024-10-20 ENCOUNTER — HOSPITAL ENCOUNTER (EMERGENCY)
Facility: HOSPITAL | Age: 29
Discharge: HOME | End: 2024-10-20
Attending: EMERGENCY MEDICINE
Payer: MEDICARE

## 2024-10-20 VITALS
HEIGHT: 73 IN | RESPIRATION RATE: 20 BRPM | DIASTOLIC BLOOD PRESSURE: 82 MMHG | OXYGEN SATURATION: 99 % | TEMPERATURE: 98 F | HEART RATE: 82 BPM | BODY MASS INDEX: 38.62 KG/M2 | SYSTOLIC BLOOD PRESSURE: 121 MMHG | WEIGHT: 291.38 LBS

## 2024-10-20 DIAGNOSIS — R10.9 ABDOMINAL PAIN, UNSPECIFIED ABDOMINAL LOCATION: ICD-10-CM

## 2024-10-20 DIAGNOSIS — R10.31 RIGHT LOWER QUADRANT ABDOMINAL PAIN: Primary | ICD-10-CM

## 2024-10-20 DIAGNOSIS — R10.9 RIGHT SIDED ABDOMINAL PAIN: ICD-10-CM

## 2024-10-20 LAB
ALBUMIN SERPL-MCNC: 4.1 G/DL (ref 3.4–5)
ALBUMIN/GLOB SERPL: 1.4 RATIO
ALP SERPL-CCNC: 82 UNIT/L (ref 50–144)
ALT SERPL-CCNC: 39 UNIT/L (ref 1–45)
ANION GAP SERPL CALC-SCNC: 6 MEQ/L (ref 2–13)
AST SERPL-CCNC: 33 UNIT/L (ref 17–59)
BASOPHILS # BLD AUTO: 0.04 X10(3)/MCL (ref 0.01–0.08)
BASOPHILS NFR BLD AUTO: 0.5 % (ref 0.1–1.2)
BILIRUB SERPL-MCNC: 0.8 MG/DL (ref 0–1)
BILIRUB UR QL STRIP.AUTO: NEGATIVE
BUN SERPL-MCNC: 10 MG/DL (ref 7–20)
CALCIUM SERPL-MCNC: 9.5 MG/DL (ref 8.4–10.2)
CHLORIDE SERPL-SCNC: 102 MMOL/L (ref 98–110)
CLARITY UR: CLEAR
CO2 SERPL-SCNC: 27 MMOL/L (ref 21–32)
COLOR UR AUTO: YELLOW
CREAT SERPL-MCNC: 0.54 MG/DL (ref 0.66–1.25)
CREAT/UREA NIT SERPL: 19 (ref 12–20)
EOSINOPHIL # BLD AUTO: 0.43 X10(3)/MCL (ref 0.04–0.54)
EOSINOPHIL NFR BLD AUTO: 5.5 % (ref 0.7–7)
ERYTHROCYTE [DISTWIDTH] IN BLOOD BY AUTOMATED COUNT: 12.5 %
GFR SERPLBLD CREATININE-BSD FMLA CKD-EPI: >90 ML/MIN/1.73/M2
GLOBULIN SER-MCNC: 3 GM/DL (ref 2–3.9)
GLUCOSE SERPL-MCNC: 287 MG/DL (ref 70–115)
GLUCOSE UR QL STRIP: >=1000
HCT VFR BLD AUTO: 42 % (ref 36–52)
HGB BLD-MCNC: 14.3 G/DL (ref 13–18)
HGB UR QL STRIP: NEGATIVE
IMM GRANULOCYTES # BLD AUTO: 0.02 X10(3)/MCL (ref 0–0.03)
IMM GRANULOCYTES NFR BLD AUTO: 0.3 % (ref 0–0.5)
KETONES UR QL STRIP: NEGATIVE
LEUKOCYTE ESTERASE UR QL STRIP: NEGATIVE
LIPASE SERPL-CCNC: 533 U/L (ref 23–300)
LYMPHOCYTES # BLD AUTO: 2.44 X10(3)/MCL (ref 1.32–3.57)
LYMPHOCYTES NFR BLD AUTO: 31.4 % (ref 20–55)
MCH RBC QN AUTO: 29.4 PG (ref 27–34)
MCHC RBC AUTO-ENTMCNC: 34 G/DL (ref 31–37)
MCV RBC AUTO: 86.4 FL (ref 79–99)
MONOCYTES # BLD AUTO: 0.72 X10(3)/MCL (ref 0.3–0.82)
MONOCYTES NFR BLD AUTO: 9.3 % (ref 4.7–12.5)
NEUTROPHILS # BLD AUTO: 4.11 X10(3)/MCL (ref 1.78–5.38)
NEUTROPHILS NFR BLD AUTO: 53 % (ref 37–73)
NITRITE UR QL STRIP: NEGATIVE
NRBC BLD AUTO-RTO: 0 %
PH UR STRIP: 6 [PH]
PLATELET # BLD AUTO: 287 X10(3)/MCL (ref 140–371)
PMV BLD AUTO: 8.6 FL (ref 9.4–12.4)
POTASSIUM SERPL-SCNC: 3.9 MMOL/L (ref 3.5–5.1)
PROT SERPL-MCNC: 7.1 GM/DL (ref 6.3–8.2)
PROT UR QL STRIP: NEGATIVE
RBC # BLD AUTO: 4.86 X10(6)/MCL (ref 4–6)
SODIUM SERPL-SCNC: 135 MMOL/L (ref 136–145)
SP GR UR STRIP.AUTO: 1.02 (ref 1–1.03)
UROBILINOGEN UR STRIP-ACNC: 0.2
WBC # BLD AUTO: 7.76 X10(3)/MCL (ref 4–11.5)

## 2024-10-20 PROCEDURE — 96372 THER/PROPH/DIAG INJ SC/IM: CPT | Mod: 59 | Performed by: EMERGENCY MEDICINE

## 2024-10-20 PROCEDURE — 96374 THER/PROPH/DIAG INJ IV PUSH: CPT | Mod: 59

## 2024-10-20 PROCEDURE — 83690 ASSAY OF LIPASE: CPT | Performed by: EMERGENCY MEDICINE

## 2024-10-20 PROCEDURE — 63600175 PHARM REV CODE 636 W HCPCS: Performed by: EMERGENCY MEDICINE

## 2024-10-20 PROCEDURE — 81003 URINALYSIS AUTO W/O SCOPE: CPT | Performed by: EMERGENCY MEDICINE

## 2024-10-20 PROCEDURE — 25000003 PHARM REV CODE 250: Performed by: EMERGENCY MEDICINE

## 2024-10-20 PROCEDURE — 99285 EMERGENCY DEPT VISIT HI MDM: CPT | Mod: 25

## 2024-10-20 PROCEDURE — 85025 COMPLETE CBC W/AUTO DIFF WBC: CPT | Performed by: EMERGENCY MEDICINE

## 2024-10-20 PROCEDURE — 80053 COMPREHEN METABOLIC PANEL: CPT | Performed by: EMERGENCY MEDICINE

## 2024-10-20 PROCEDURE — 25500020 PHARM REV CODE 255: Performed by: EMERGENCY MEDICINE

## 2024-10-20 RX ORDER — ACETAMINOPHEN 500 MG
1000 TABLET ORAL
Status: COMPLETED | OUTPATIENT
Start: 2024-10-20 | End: 2024-10-20

## 2024-10-20 RX ORDER — DICYCLOMINE HYDROCHLORIDE 10 MG/ML
20 INJECTION INTRAMUSCULAR
Status: COMPLETED | OUTPATIENT
Start: 2024-10-20 | End: 2024-10-20

## 2024-10-20 RX ORDER — KETOROLAC TROMETHAMINE 30 MG/ML
15 INJECTION, SOLUTION INTRAMUSCULAR; INTRAVENOUS
Status: COMPLETED | OUTPATIENT
Start: 2024-10-20 | End: 2024-10-20

## 2024-10-20 RX ADMIN — IOHEXOL 90 ML: 300 INJECTION, SOLUTION INTRAVENOUS at 12:10

## 2024-10-20 RX ADMIN — DICYCLOMINE HYDROCHLORIDE 20 MG: 10 INJECTION, SOLUTION INTRAMUSCULAR at 11:10

## 2024-10-20 RX ADMIN — KETOROLAC TROMETHAMINE 15 MG: 30 INJECTION, SOLUTION INTRAMUSCULAR at 11:10

## 2024-10-20 RX ADMIN — ACETAMINOPHEN 1000 MG: 500 TABLET ORAL at 11:10

## 2024-10-20 NOTE — DISCHARGE INSTRUCTIONS
Return to the ER for worsening symptoms, fever, chills, chest pain, shortness of breath, passing out, black/bloody stools or any other concerns that you might have.  Otherwise see your doctor in the next 2-3 days.     Don't take your metformin for the next 24 hours since you had IV contrast today.

## 2024-10-20 NOTE — ED PROVIDER NOTES
Encounter Date: 10/20/2024       History     Chief Complaint   Patient presents with    Pain     PRESENT TO ED PER POV WITH C/O RT. LOWER SIDE/ABDOMINAL PAIN ONSET FRIDAY. DENIES INJURY TO AREA. DENIES N/V/D/, FEVER, OR TROUBLE URINATING     Abdominal Pain     This is a 29-year-old autistic male who presents today with right lower quadrant abdominal pain.  He stated that it started Friday but worsened yesterday.  He states that the pain is a 10/10.  He denies fever, chills, black or bloody stools, diarrhea, nausea, vomiting, anorexia, testicular pain or swelling, dysuria, hematuria, or any other concerns at this time.  He denies surgeries of the abdomen.  He denies trauma.  There is no radiation of the pain but the pain is constant and sharp.    The history is provided by the patient and medical records.     Review of patient's allergies indicates:   Allergen Reactions    Codeine      Past Medical History:   Diagnosis Date    Anxiety     Autism     Diabetes mellitus     Diabetes mellitus, type 2     Hyperlipidemia      Past Surgical History:   Procedure Laterality Date    ADENOIDECTOMY      TONSILLECTOMY      WRIST SURGERY       Family History   Problem Relation Name Age of Onset    Hypertension Mother      Hyperlipidemia Mother      Breast cancer Mother      Anxiety disorder Mother      Depression Mother      Hypertension Father      Kidney failure Father      Heart failure Father      Diabetes type II Father      Rheum arthritis Father      Hyperlipidemia Father       Social History     Tobacco Use    Smoking status: Never    Smokeless tobacco: Never   Substance Use Topics    Alcohol use: Not Currently    Drug use: Never     Review of Systems   All other systems reviewed and are negative.      Physical Exam     Initial Vitals [10/20/24 1139]   BP Pulse Resp Temp SpO2   (!) 141/83 96 18 97.3 °F (36.3 °C) 99 %      MAP       --         Physical Exam    Constitutional: He appears well-developed and well-nourished. He  is not diaphoretic. No distress.   HENT:   Head: Normocephalic and atraumatic.   Eyes: Conjunctivae and EOM are normal.   Neck: Neck supple. No thyromegaly present. No tracheal deviation present. No JVD present.   Normal range of motion.  Cardiovascular:  Normal rate, regular rhythm, normal heart sounds and intact distal pulses.           Pulmonary/Chest: Breath sounds normal. No stridor.   Abdominal: Abdomen is soft. Bowel sounds are normal. He exhibits no distension and no mass. There is abdominal tenderness (RLQ tenderness). There is no rebound and no guarding.   Musculoskeletal:         General: No tenderness or edema. Normal range of motion.      Cervical back: Normal range of motion and neck supple.      Comments: No CVA tenderness     Lymphadenopathy:     He has no cervical adenopathy.   Neurological: He is alert and oriented to person, place, and time. He has normal strength. No cranial nerve deficit or sensory deficit. GCS score is 15. GCS eye subscore is 4. GCS verbal subscore is 5. GCS motor subscore is 6.   Skin: Skin is warm and dry. No rash and no abscess noted. No erythema. No pallor.   Psychiatric: He has a normal mood and affect. His behavior is normal. Judgment and thought content normal.         ED Course   Procedures  Labs Reviewed   COMPREHENSIVE METABOLIC PANEL - Abnormal       Result Value    Sodium 135 (*)     Potassium 3.9      Chloride 102      CO2 27      Glucose 287 (*)     Blood Urea Nitrogen 10      Creatinine 0.54 (*)     Calcium 9.5      Protein Total 7.1      Albumin 4.1      Globulin 3.0      Albumin/Globulin Ratio 1.4      Bilirubin Total 0.8      ALP 82      ALT 39      AST 33      eGFR >90      Anion Gap 6.0      BUN/Creatinine Ratio 19     LIPASE - Abnormal    Lipase Level 533 (*)    URINALYSIS, REFLEX TO URINE CULTURE - Abnormal    Color, UA Yellow      Appearance, UA Clear      Specific Gravity, UA 1.025      pH, UA 6.0      Protein, UA Negative      Glucose, UA >=1000 (*)      Ketones, UA Negative      Blood, UA Negative      Bilirubin, UA Negative      Urobilinogen, UA 0.2      Nitrites, UA Negative      Leukocyte Esterase, UA Negative      Narrative:      URINE STABILITY IS 2 HOURS AT ROOM TEMP OR    SIX HOURS REFRIGERATED. PERFORMING TESTING ON    SPECIMENS GREATER THAN THIS AGE MAY AFFECT THE    FOLLOWING TESTS:    PH          SPECIFIC GRAVITY           BLOOD    CLARITY     BILIRUBIN               UROBILINOGEN   CBC WITH DIFFERENTIAL - Abnormal    WBC 7.76      RBC 4.86      Hgb 14.3      Hct 42.0      MCV 86.4      MCH 29.4      MCHC 34.0      RDW 12.5      Platelet 287      MPV 8.6 (*)     Neut % 53.0      Lymph % 31.4      Mono % 9.3      Eos % 5.5      Basophil % 0.5      Lymph # 2.44      Neut # 4.11      Mono # 0.72      Eos # 0.43      Baso # 0.04      IG# 0.02      IG% 0.3      NRBC% 0.0     CBC W/ AUTO DIFFERENTIAL    Narrative:     The following orders were created for panel order CBC W/ AUTO DIFFERENTIAL.  Procedure                               Abnormality         Status                     ---------                               -----------         ------                     CBC with Differential[[id number]]       Abnormal            Final result                 Please view results for these tests on the individual orders.          Imaging Results              CT Abdomen Pelvis With IV Contrast NO Oral Contrast (Final result)  Result time 10/20/24 12:50:02      Final result by Isidoro Stein MD (10/20/24 12:50:02)                   Impression:      No acute abnormalities are seen      Electronically signed by: Isidoro Stein MD  Date:    10/20/2024  Time:    12:50               Narrative:    EXAMINATION:  CT ABDOMEN PELVIS WITH IV CONTRAST    CLINICAL HISTORY:  RLQ abdominal pain (Age >= 14y);    TECHNIQUE:  Low dose axial images, sagittal and coronal reformations were obtained from the lung bases to the pubic symphysis following the IV administration of 100 mL of  Omnipaque 300 no oral contrast was given    COMPARISON:  None.    FINDINGS:  Lung bases are clear.  Liver appears normal.  Spleen appears normal.  Pancreas appears normal.  Biliary system appears normal.  The adrenals are not enlarged.  Kidneys appear normal.  Aorta shows no evidence of an aneurysm.  There are diverticulum in the colon without evidence of acute diverticulitis the appendix is within normal limits.  No adenopathy is seen.  No free air is noted.  No free fluid is seen.                                       Medications   ketorolac injection 15 mg (15 mg Intravenous Given 10/20/24 1153)   dicyclomine injection 20 mg (20 mg Intramuscular Given 10/20/24 1153)   acetaminophen tablet 1,000 mg (1,000 mg Oral Given 10/20/24 1153)   iohexoL (OMNIPAQUE 300) injection 100 mL (90 mLs Intravenous Given 10/20/24 1237)     Medical Decision Making  Differential diagnosis includes kidney stone versus urinary tract infection versus pyelonephritis versus appendicitis versus biliary disease versus pancreatitis versus colitis versus gastroenteritis versus other    Pain improved.  Lipase slightly elevated; CT shows normal pancrease and no other concerning findings.  Pt has no vomiting, nausea, anorexia, testicular pain/swelling, fever, chills,  symptoms, chest pain, shortness of breath or other concerns.  Unsure of cause of patient's RLQ and right side pain.  No RUQ tenderness.  Biliary system normal on CT and LFTs normal.  Pt could have early pancreatitis but pain improved with medications and is down to 2/10.  Pt tolerating PO intake here.    I discussed findings with the patient and answered questions and concerns.  Patient was discharged with strict return precautions and follow up instructions.      Amount and/or Complexity of Data Reviewed  Labs: ordered.  Radiology: ordered.    Risk  OTC drugs.  Prescription drug management.                                      Clinical Impression:  Final diagnoses:  [R10.31] Right  lower quadrant abdominal pain (Primary)  [R10.9] Right sided abdominal pain  [R10.9] Abdominal pain, unspecified abdominal location          ED Disposition Condition    Discharge Stable          ED Prescriptions    None       Follow-up Information       Follow up With Specialties Details Why Contact Info    [facility] FNP Family Medicine Call in 2 days  [address] [place] [address]  [phone]               Michelle Day MD  10/20/24 3598

## 2024-10-22 ENCOUNTER — PATIENT MESSAGE (OUTPATIENT)
Dept: FAMILY MEDICINE | Facility: CLINIC | Age: 29
End: 2024-10-22

## 2024-10-22 ENCOUNTER — OFFICE VISIT (OUTPATIENT)
Dept: FAMILY MEDICINE | Facility: CLINIC | Age: 29
End: 2024-10-22
Payer: MEDICARE

## 2024-10-22 ENCOUNTER — PATIENT OUTREACH (OUTPATIENT)
Facility: CLINIC | Age: 29
End: 2024-10-22
Payer: MEDICARE

## 2024-10-22 ENCOUNTER — TELEPHONE (OUTPATIENT)
Dept: FAMILY MEDICINE | Facility: CLINIC | Age: 29
End: 2024-10-22

## 2024-10-22 ENCOUNTER — HOSPITAL ENCOUNTER (OUTPATIENT)
Dept: RADIOLOGY | Facility: HOSPITAL | Age: 29
Discharge: HOME | End: 2024-10-22
Attending: NURSE PRACTITIONER
Payer: MEDICARE

## 2024-10-22 VITALS
TEMPERATURE: 97 F | OXYGEN SATURATION: 99 % | SYSTOLIC BLOOD PRESSURE: 116 MMHG | WEIGHT: 286.81 LBS | HEIGHT: 73 IN | DIASTOLIC BLOOD PRESSURE: 70 MMHG | HEART RATE: 70 BPM | BODY MASS INDEX: 38.01 KG/M2

## 2024-10-22 DIAGNOSIS — R10.11 RUQ PAIN: Primary | ICD-10-CM

## 2024-10-22 DIAGNOSIS — F41.9 ANXIETY: ICD-10-CM

## 2024-10-22 DIAGNOSIS — J45.20 MILD INTERMITTENT ASTHMA WITHOUT COMPLICATION: ICD-10-CM

## 2024-10-22 DIAGNOSIS — R10.11 RUQ PAIN: ICD-10-CM

## 2024-10-22 LAB
ALBUMIN SERPL-MCNC: 4.1 G/DL (ref 3.4–5)
ALBUMIN/GLOB SERPL: 1.5 RATIO
ALP SERPL-CCNC: 96 UNIT/L (ref 50–144)
ALT SERPL-CCNC: 36 UNIT/L (ref 1–45)
ANION GAP SERPL CALC-SCNC: 7 MEQ/L (ref 2–13)
AST SERPL-CCNC: 33 UNIT/L (ref 17–59)
BASOPHILS # BLD AUTO: 0.05 X10(3)/MCL (ref 0.01–0.08)
BASOPHILS NFR BLD AUTO: 0.6 % (ref 0.1–1.2)
BILIRUB SERPL-MCNC: 0.5 MG/DL (ref 0–1)
BUN SERPL-MCNC: 8 MG/DL (ref 7–20)
CALCIUM SERPL-MCNC: 9.7 MG/DL (ref 8.4–10.2)
CHLORIDE SERPL-SCNC: 101 MMOL/L (ref 98–110)
CO2 SERPL-SCNC: 25 MMOL/L (ref 21–32)
CREAT SERPL-MCNC: 0.44 MG/DL (ref 0.66–1.25)
CREAT/UREA NIT SERPL: 18 (ref 12–20)
EOSINOPHIL # BLD AUTO: 0.58 X10(3)/MCL (ref 0.04–0.54)
EOSINOPHIL NFR BLD AUTO: 6.6 % (ref 0.7–7)
ERYTHROCYTE [DISTWIDTH] IN BLOOD BY AUTOMATED COUNT: 12.8 %
GFR SERPLBLD CREATININE-BSD FMLA CKD-EPI: >90 ML/MIN/1.73/M2
GLOBULIN SER-MCNC: 2.8 GM/DL (ref 2–3.9)
GLUCOSE SERPL-MCNC: 196 MG/DL (ref 70–115)
HCT VFR BLD AUTO: 41.1 % (ref 36–52)
HGB BLD-MCNC: 14.2 G/DL (ref 13–18)
IMM GRANULOCYTES # BLD AUTO: 0.02 X10(3)/MCL (ref 0–0.03)
IMM GRANULOCYTES NFR BLD AUTO: 0.2 % (ref 0–0.5)
LIPASE SERPL-CCNC: 102 U/L (ref 23–300)
LYMPHOCYTES # BLD AUTO: 3.21 X10(3)/MCL (ref 1.32–3.57)
LYMPHOCYTES NFR BLD AUTO: 36.4 % (ref 20–55)
MCH RBC QN AUTO: 29.3 PG (ref 27–34)
MCHC RBC AUTO-ENTMCNC: 34.5 G/DL (ref 31–37)
MCV RBC AUTO: 84.7 FL (ref 79–99)
MONOCYTES # BLD AUTO: 0.8 X10(3)/MCL (ref 0.3–0.82)
MONOCYTES NFR BLD AUTO: 9.1 % (ref 4.7–12.5)
NEUTROPHILS # BLD AUTO: 4.17 X10(3)/MCL (ref 1.78–5.38)
NEUTROPHILS NFR BLD AUTO: 47.1 % (ref 37–73)
NRBC BLD AUTO-RTO: 0 %
PLATELET # BLD AUTO: 339 X10(3)/MCL (ref 140–371)
PMV BLD AUTO: 9.4 FL (ref 9.4–12.4)
POTASSIUM SERPL-SCNC: 4 MMOL/L (ref 3.5–5.1)
PROT SERPL-MCNC: 6.9 GM/DL (ref 6.3–8.2)
RBC # BLD AUTO: 4.85 X10(6)/MCL (ref 4–6)
SODIUM SERPL-SCNC: 133 MMOL/L (ref 136–145)
WBC # BLD AUTO: 8.83 X10(3)/MCL (ref 4–11.5)

## 2024-10-22 PROCEDURE — 99214 OFFICE O/P EST MOD 30 MIN: CPT | Mod: ,,, | Performed by: NURSE PRACTITIONER

## 2024-10-22 PROCEDURE — 3008F BODY MASS INDEX DOCD: CPT | Mod: ,,, | Performed by: NURSE PRACTITIONER

## 2024-10-22 PROCEDURE — 76700 US EXAM ABDOM COMPLETE: CPT | Mod: TC

## 2024-10-22 PROCEDURE — 4010F ACE/ARB THERAPY RXD/TAKEN: CPT | Mod: ,,, | Performed by: NURSE PRACTITIONER

## 2024-10-22 PROCEDURE — 3046F HEMOGLOBIN A1C LEVEL >9.0%: CPT | Mod: ,,, | Performed by: NURSE PRACTITIONER

## 2024-10-22 PROCEDURE — 3078F DIAST BP <80 MM HG: CPT | Mod: ,,, | Performed by: NURSE PRACTITIONER

## 2024-10-22 PROCEDURE — 1160F RVW MEDS BY RX/DR IN RCRD: CPT | Mod: ,,, | Performed by: NURSE PRACTITIONER

## 2024-10-22 PROCEDURE — 2023F DILAT RTA XM W/O RTNOPTHY: CPT | Mod: ,,, | Performed by: NURSE PRACTITIONER

## 2024-10-22 PROCEDURE — 1159F MED LIST DOCD IN RCRD: CPT | Mod: ,,, | Performed by: NURSE PRACTITIONER

## 2024-10-22 PROCEDURE — 3074F SYST BP LT 130 MM HG: CPT | Mod: ,,, | Performed by: NURSE PRACTITIONER

## 2024-10-22 RX ORDER — IBUPROFEN 800 MG/1
800 TABLET ORAL EVERY 6 HOURS PRN
Qty: 30 TABLET | Refills: 0 | Status: SHIPPED | OUTPATIENT
Start: 2024-10-22

## 2024-10-22 RX ORDER — CLONAZEPAM 0.5 MG/1
0.5 TABLET ORAL NIGHTLY PRN
Qty: 30 TABLET | Refills: 0 | Status: SHIPPED | OUTPATIENT
Start: 2024-10-22 | End: 2024-11-21

## 2024-10-22 NOTE — PROGRESS NOTES
Records Received, hyper-linked into chart at this time. The following record(s)  below were uploaded for Health Maintenance .             8/6/2024 DM EYE EXAM

## 2024-10-22 NOTE — PROGRESS NOTES
Patient ID: Michael Malone  : 1995    Chief Complaint: Flank Pain    Allergies: Patient is allergic to codeine.     History of Present Illness:  The patient is a 29 y.o. Black or  male who presents to clinic for evaluation of Flank Pain.  He complains of right upper quadrant pain that radiates into his back.  Pain began about 5 days ago.  Pain is worsened by movement and is unchanged by meals.  His appetite remains normal.  Denies nausea, vomiting, fever, or chills.      ED visit 10/20/2024 with complaint of right lower quadrant pain.  CBC was unremarkable but lipase elevated at 533.  CT of the abdomen and pelvis was negative.  Urinalysis positive for glucose but otherwise negative.    He is accompanied by his mother who reports that he has not been himself over the past 1-2 weeks.  He experienced the recent death of his grandmother and he has been very sad and nervous.  She is requesting a refill on clonazepam that he took for anxiety in the past.  He continues to participate in counseling.  No SI/HI.    Social History:  reports that he has never smoked. He has never used smokeless tobacco. He reports that he does not currently use alcohol. He reports that he does not use drugs.    Past Medical History:  has a past medical history of Anxiety, Autism, Diabetes mellitus, Diabetes mellitus, type 2, and Hyperlipidemia.    Current Medications:  Current Outpatient Medications   Medication Instructions    albuterol (PROVENTIL) 2.5 mg, Nebulization, Every 6 hours PRN, Rescue    albuterol (VENTOLIN HFA) 90 mcg/actuation inhaler 2 puffs, Inhalation, Every 6 hours PRN, Rescue    atorvastatin (LIPITOR) 10 mg, Daily    citalopram (CELEXA) 10 mg, Oral, Daily    clonazePAM (KLONOPIN) 0.5 mg, Oral, Nightly PRN    donepeziL (ARICEPT) 10 mg, Oral, Daily    FARXIGA 10 mg, Oral, Daily    lisinopriL (PRINIVIL,ZESTRIL) 20 mg, Daily    metFORMIN (GLUCOPHAGE) 1,000 mg, Oral, With breakfast    montelukast  "(SINGULAIR) 5 mg, Oral, Daily    montelukast (SINGULAIR) 10 mg, Daily    pioglitazone (ACTOS) 30 mg, Daily    TOUJEO SOLOSTAR U-300 INSULIN 60 Units, Subcutaneous, Daily    traZODone (DESYREL) 75 mg, Nightly PRN    TRULICITY 4.5 mg, Subcutaneous, Every 7 days    VASCEPA 2,000 mg, Oral, Nightly       ROS: See HPI    Visit Vitals  /70 (BP Location: Right arm, Patient Position: Sitting)   Pulse 70   Temp 97.2 °F (36.2 °C) (Temporal)   Ht 6' 0.99" (1.854 m)   Wt 130.1 kg (286 lb 12.8 oz)   SpO2 99%   BMI 37.85 kg/m²       Physical Exam  Vitals reviewed.   Constitutional:       Appearance: Normal appearance. He is obese.   Cardiovascular:      Rate and Rhythm: Normal rate and regular rhythm.      Heart sounds: Normal heart sounds.   Pulmonary:      Effort: Pulmonary effort is normal.      Breath sounds: Normal breath sounds.   Abdominal:      General: Bowel sounds are normal.      Palpations: Abdomen is soft.      Tenderness: There is abdominal tenderness (RUQ). There is no right CVA tenderness or left CVA tenderness.   Skin:     General: Skin is warm and dry.   Neurological:      Mental Status: He is alert and oriented to person, place, and time. Mental status is at baseline.          Labs Reviewed:  Chemistry:  Lab Results   Component Value Date     (L) 10/20/2024    K 3.9 10/20/2024    BUN 10 10/20/2024    CREATININE 0.54 (L) 10/20/2024    EGFRNORACEVR >90 10/20/2024    GLUCOSE 287 (H) 10/20/2024    CALCIUM 9.5 10/20/2024    ALKPHOS 82 10/20/2024    LABPROT 7.1 10/20/2024    ALBUMIN 4.1 10/20/2024    AST 33 10/20/2024    ALT 39 10/20/2024    MG 1.60 07/05/2024    PHOS 2.2 (L) 07/03/2024    TSH 1.240 03/02/2024      Hematology:  Lab Results   Component Value Date    WBC 7.76 10/20/2024    RBC 4.86 10/20/2024    HGB 14.3 10/20/2024    HCT 42.0 10/20/2024    MCV 86.4 10/20/2024    MCH 29.4 10/20/2024    MCHC 34.0 10/20/2024    RDW 12.5 10/20/2024     10/20/2024    MPV 8.6 (L) 10/20/2024 "           Assessment & Plan:  1. RUQ pain  Assessment & Plan:  CT, lab results reviewed   Obtain ultrasound abdomen to assess gallbladder  Repeat CBC, CMP, and lipase today  ER precautions given    Orders:  -     US Abdomen Complete; Future; Expected date: 10/22/2024  -     Lipase; Future; Expected date: 10/22/2024  -     CBC Auto Differential; Future; Expected date: 10/22/2024  -     Comprehensive Metabolic Panel; Future; Expected date: 10/22/2024    2. Anxiety  Overview:  On clonazepam    Assessment & Plan:  Refill clonazepam as needed,  reviewed and up-to-date   Continue counseling    Orders:  -     clonazePAM (KLONOPIN) 0.5 MG tablet; Take 1 tablet (0.5 mg total) by mouth nightly as needed for Anxiety.  Dispense: 30 tablet; Refill: 0    3. Mild intermittent asthma without complication  Overview:  On montelukast and albuterol as needed    Orders:  -     NEBULIZER KIT (SUPPLIES) FOR HOME USE         Future Appointments   Date Time Provider Department Center   10/22/2024 10:00 AM [facility]   10/30/2024  2:30 PM [facility] FNP [facility]       No follow-ups on file. Call sooner if needed.    STACEY Duran    Lab Frequency Next Occurrence   Vitamin D Once 07/24/2024   CBC Auto Differential Once 09/13/2024   Comprehensive Metabolic Panel Once 09/13/2024   Lipid Panel Once 09/13/2024   Hemoglobin A1C Once 09/13/2024   TSH Once 09/13/2024   Ambulatory referral/consult to Neurology Once 06/27/2024

## 2024-10-22 NOTE — ASSESSMENT & PLAN NOTE
CT, lab results reviewed   Obtain ultrasound abdomen to assess gallbladder  Repeat CBC, CMP, and lipase today  ER precautions given

## 2024-10-30 ENCOUNTER — TELEPHONE (OUTPATIENT)
Dept: FAMILY MEDICINE | Facility: CLINIC | Age: 29
End: 2024-10-30

## 2024-10-30 PROBLEM — R05.9 COUGH: Status: RESOLVED | Noted: 2024-09-14 | Resolved: 2024-10-30

## 2024-10-30 PROBLEM — R10.11 RUQ PAIN: Status: RESOLVED | Noted: 2024-10-22 | Resolved: 2024-10-30

## 2024-10-30 PROBLEM — R09.81 SINUS CONGESTION: Status: RESOLVED | Noted: 2024-07-05 | Resolved: 2024-10-30

## 2024-10-30 PROBLEM — B34.9 VIRAL SYNDROME: Status: RESOLVED | Noted: 2023-12-19 | Resolved: 2024-10-30

## 2024-10-30 PROBLEM — J06.9 UPPER RESPIRATORY INFECTION: Status: RESOLVED | Noted: 2024-07-05 | Resolved: 2024-10-30

## 2024-11-14 DIAGNOSIS — E78.2 MIXED HYPERLIPIDEMIA: ICD-10-CM

## 2024-11-14 RX ORDER — ICOSAPENT ETHYL 1000 MG/1
2 CAPSULE ORAL NIGHTLY
Qty: 60 CAPSULE | Refills: 11 | Status: SHIPPED | OUTPATIENT
Start: 2024-11-14

## 2024-12-03 ENCOUNTER — E-VISIT (OUTPATIENT)
Dept: FAMILY MEDICINE | Facility: CLINIC | Age: 29
End: 2024-12-03
Payer: MEDICARE

## 2024-12-03 DIAGNOSIS — E11.65 TYPE 2 DIABETES MELLITUS WITH HYPERGLYCEMIA, WITH LONG-TERM CURRENT USE OF INSULIN: Primary | ICD-10-CM

## 2024-12-03 DIAGNOSIS — Z79.4 TYPE 2 DIABETES MELLITUS WITH HYPERGLYCEMIA, WITH LONG-TERM CURRENT USE OF INSULIN: Primary | ICD-10-CM

## 2024-12-03 PROCEDURE — 99499 UNLISTED E&M SERVICE: CPT | Mod: 95,,, | Performed by: NURSE PRACTITIONER

## 2024-12-03 NOTE — PROGRESS NOTES
Dear Michael,     I will forward to my staff to cancel this E-Visit and keep upcoming in office visit.  Patient notified to have labs drawn at the hospital tomorrow.     [facility]ell Witt

## 2024-12-04 ENCOUNTER — LAB VISIT (OUTPATIENT)
Dept: LAB | Facility: HOSPITAL | Age: 29
End: 2024-12-04
Attending: NURSE PRACTITIONER
Payer: MEDICARE

## 2024-12-04 DIAGNOSIS — Z79.4 TYPE 2 DIABETES MELLITUS WITH HYPERGLYCEMIA, WITH LONG-TERM CURRENT USE OF INSULIN: ICD-10-CM

## 2024-12-04 DIAGNOSIS — E11.65 TYPE 2 DIABETES MELLITUS WITH HYPERGLYCEMIA, WITH LONG-TERM CURRENT USE OF INSULIN: ICD-10-CM

## 2024-12-04 DIAGNOSIS — E66.01 MORBID (SEVERE) OBESITY DUE TO EXCESS CALORIES: ICD-10-CM

## 2024-12-04 DIAGNOSIS — E78.2 MIXED HYPERLIPIDEMIA: ICD-10-CM

## 2024-12-04 LAB
ALBUMIN SERPL-MCNC: 4.5 G/DL (ref 3.4–5)
ALBUMIN/GLOB SERPL: 1.9 RATIO
ALP SERPL-CCNC: 84 UNIT/L (ref 50–144)
ALT SERPL-CCNC: 39 UNIT/L (ref 1–45)
ANION GAP SERPL CALC-SCNC: 8 MEQ/L (ref 2–13)
AST SERPL-CCNC: 25 UNIT/L (ref 17–59)
BASOPHILS # BLD AUTO: 0.05 X10(3)/MCL (ref 0.01–0.08)
BASOPHILS NFR BLD AUTO: 0.5 % (ref 0.1–1.2)
BILIRUB SERPL-MCNC: 1.2 MG/DL (ref 0–1)
BUN SERPL-MCNC: 14 MG/DL (ref 7–20)
CALCIUM SERPL-MCNC: 10 MG/DL (ref 8.4–10.2)
CHLORIDE SERPL-SCNC: 101 MMOL/L (ref 98–110)
CHOLEST SERPL-MCNC: 99 MG/DL (ref 0–200)
CO2 SERPL-SCNC: 28 MMOL/L (ref 21–32)
CREAT SERPL-MCNC: 0.67 MG/DL (ref 0.66–1.25)
CREAT/UREA NIT SERPL: 21 (ref 12–20)
EOSINOPHIL # BLD AUTO: 0.53 X10(3)/MCL (ref 0.04–0.54)
EOSINOPHIL NFR BLD AUTO: 5.5 % (ref 0.7–7)
ERYTHROCYTE [DISTWIDTH] IN BLOOD BY AUTOMATED COUNT: 12.8 %
EST. AVERAGE GLUCOSE BLD GHB EST-MCNC: 289.1 MG/DL (ref 70–115)
GFR SERPLBLD CREATININE-BSD FMLA CKD-EPI: >90 ML/MIN/1.73/M2
GLOBULIN SER-MCNC: 2.4 GM/DL (ref 2–3.9)
GLUCOSE SERPL-MCNC: 200 MG/DL (ref 70–115)
HBA1C MFR BLD: 11.7 % (ref 4–6)
HCT VFR BLD AUTO: 43 % (ref 36–52)
HDLC SERPL-MCNC: 25 MG/DL (ref 40–60)
HGB BLD-MCNC: 14.6 G/DL (ref 13–18)
IMM GRANULOCYTES # BLD AUTO: 0.03 X10(3)/MCL (ref 0–0.03)
IMM GRANULOCYTES NFR BLD AUTO: 0.3 % (ref 0–0.5)
LDLC SERPL DIRECT ASSAY-SCNC: <30 MG/DL (ref 30–100)
LYMPHOCYTES # BLD AUTO: 3.21 X10(3)/MCL (ref 1.32–3.57)
LYMPHOCYTES NFR BLD AUTO: 33.5 % (ref 20–55)
MCH RBC QN AUTO: 29.7 PG (ref 27–34)
MCHC RBC AUTO-ENTMCNC: 34 G/DL (ref 31–37)
MCV RBC AUTO: 87.4 FL (ref 79–99)
MONOCYTES # BLD AUTO: 0.81 X10(3)/MCL (ref 0.3–0.82)
MONOCYTES NFR BLD AUTO: 8.5 % (ref 4.7–12.5)
NEUTROPHILS # BLD AUTO: 4.94 X10(3)/MCL (ref 1.78–5.38)
NEUTROPHILS NFR BLD AUTO: 51.7 % (ref 37–73)
NRBC BLD AUTO-RTO: 0 %
PLATELET # BLD AUTO: 295 X10(3)/MCL (ref 140–371)
PMV BLD AUTO: 8.7 FL (ref 9.4–12.4)
POTASSIUM SERPL-SCNC: 4.2 MMOL/L (ref 3.5–5.1)
PROT SERPL-MCNC: 6.9 GM/DL (ref 6.3–8.2)
RBC # BLD AUTO: 4.92 X10(6)/MCL (ref 4–6)
SODIUM SERPL-SCNC: 137 MMOL/L (ref 136–145)
TRIGL SERPL-MCNC: 425 MG/DL (ref 30–200)
TSH SERPL-ACNC: 1.26 UIU/ML (ref 0.36–3.74)
WBC # BLD AUTO: 9.57 X10(3)/MCL (ref 4–11.5)

## 2024-12-04 PROCEDURE — 84443 ASSAY THYROID STIM HORMONE: CPT

## 2024-12-04 PROCEDURE — 36415 COLL VENOUS BLD VENIPUNCTURE: CPT

## 2024-12-04 PROCEDURE — 85025 COMPLETE CBC W/AUTO DIFF WBC: CPT

## 2024-12-04 PROCEDURE — 80061 LIPID PANEL: CPT

## 2024-12-04 PROCEDURE — 80053 COMPREHEN METABOLIC PANEL: CPT

## 2024-12-04 PROCEDURE — 83036 HEMOGLOBIN GLYCOSYLATED A1C: CPT

## 2025-01-08 DIAGNOSIS — F32.A DEPRESSION, UNSPECIFIED DEPRESSION TYPE: ICD-10-CM

## 2025-01-08 RX ORDER — CITALOPRAM 10 MG/1
10 TABLET ORAL
Qty: 90 TABLET | Refills: 3 | Status: SHIPPED | OUTPATIENT
Start: 2025-01-08

## 2025-01-10 DIAGNOSIS — J45.20 MILD INTERMITTENT ASTHMA WITHOUT COMPLICATION: ICD-10-CM

## 2025-01-13 ENCOUNTER — OFFICE VISIT (OUTPATIENT)
Dept: FAMILY MEDICINE | Facility: CLINIC | Age: 30
End: 2025-01-13
Payer: MEDICARE

## 2025-01-13 VITALS
DIASTOLIC BLOOD PRESSURE: 72 MMHG | HEIGHT: 73 IN | OXYGEN SATURATION: 98 % | HEART RATE: 78 BPM | SYSTOLIC BLOOD PRESSURE: 118 MMHG | BODY MASS INDEX: 38.04 KG/M2 | TEMPERATURE: 97 F | WEIGHT: 287 LBS

## 2025-01-13 DIAGNOSIS — E11.65 TYPE 2 DIABETES MELLITUS WITH HYPERGLYCEMIA, WITH LONG-TERM CURRENT USE OF INSULIN: ICD-10-CM

## 2025-01-13 DIAGNOSIS — F33.0 MILD EPISODE OF RECURRENT MAJOR DEPRESSIVE DISORDER: ICD-10-CM

## 2025-01-13 DIAGNOSIS — E78.2 MIXED HYPERLIPIDEMIA: ICD-10-CM

## 2025-01-13 DIAGNOSIS — Z79.4 TYPE 2 DIABETES MELLITUS WITH HYPERGLYCEMIA, WITH LONG-TERM CURRENT USE OF INSULIN: ICD-10-CM

## 2025-01-13 DIAGNOSIS — Z00.00 MEDICARE ANNUAL WELLNESS VISIT, SUBSEQUENT: Primary | ICD-10-CM

## 2025-01-13 DIAGNOSIS — E66.01 MORBID (SEVERE) OBESITY DUE TO EXCESS CALORIES: ICD-10-CM

## 2025-01-13 PROBLEM — F32.A DEPRESSION: Status: RESOLVED | Noted: 2025-01-13 | Resolved: 2025-01-13

## 2025-01-13 PROBLEM — F32.A DEPRESSION: Status: ACTIVE | Noted: 2025-01-13

## 2025-01-13 PROCEDURE — 3074F SYST BP LT 130 MM HG: CPT | Mod: ,,, | Performed by: NURSE PRACTITIONER

## 2025-01-13 PROCEDURE — 1160F RVW MEDS BY RX/DR IN RCRD: CPT | Mod: ,,, | Performed by: NURSE PRACTITIONER

## 2025-01-13 PROCEDURE — 3078F DIAST BP <80 MM HG: CPT | Mod: ,,, | Performed by: NURSE PRACTITIONER

## 2025-01-13 PROCEDURE — 99497 ADVNCD CARE PLAN 30 MIN: CPT | Mod: ,,, | Performed by: NURSE PRACTITIONER

## 2025-01-13 PROCEDURE — G0439 PPPS, SUBSEQ VISIT: HCPCS | Mod: ,,, | Performed by: NURSE PRACTITIONER

## 2025-01-13 PROCEDURE — 1159F MED LIST DOCD IN RCRD: CPT | Mod: ,,, | Performed by: NURSE PRACTITIONER

## 2025-01-13 RX ORDER — ALBUTEROL SULFATE 0.83 MG/ML
2.5 SOLUTION RESPIRATORY (INHALATION) EVERY 6 HOURS PRN
Qty: 300 ML | Refills: 11 | Status: SHIPPED | OUTPATIENT
Start: 2025-01-13

## 2025-01-13 RX ORDER — INSULIN DEGLUDEC 200 U/ML
200 INJECTION, SOLUTION SUBCUTANEOUS NIGHTLY
COMMUNITY
Start: 2025-01-05

## 2025-01-13 RX ORDER — CITALOPRAM 20 MG/1
20 TABLET, FILM COATED ORAL DAILY
Qty: 30 TABLET | Refills: 0 | Status: SHIPPED | OUTPATIENT
Start: 2025-01-13 | End: 2025-02-12

## 2025-01-13 RX ORDER — ICOSAPENT ETHYL 1000 MG/1
2 CAPSULE ORAL 2 TIMES DAILY
Qty: 360 CAPSULE | Refills: 3 | Status: SHIPPED | OUTPATIENT
Start: 2025-01-13 | End: 2026-01-13

## 2025-01-13 NOTE — ASSESSMENT & PLAN NOTE
Increase citalopram to 20 mg daily   Begin counseling  Exercise daily. Get sunlight daily.  Practice positive phrases and repeat throughout the day, along with yoga and relaxation techniques.  Establish good social support, make changes to reduce stress.  Encourage counseling.   Reports any symptoms of suicidal/homicidal ideations or self harm immediately. If clinic is closed, go to nearest emergency room.

## 2025-01-13 NOTE — PROGRESS NOTES
"   Internal Medicine    Michael Malone is a 29 y.o. male here today for a Medicare Annual Wellness visit and comprehensive Health Risk Assessment.     His insurance would no longer cover Toujeo so he was changed to Tresiba. He has made some dietary changes and started going to the gym a few days a week. He reports fasting blood sugars around 100 but last A1c 11.7. Diabetes managed by Darrell Witt.     Reports increased depression over the past several weeks.  He attributes some of his sadness to the recent death of his grandmother.  Mother reports that he is resting well.  Denies SI/HI.    Subjective   The following components were reviewed and updated:  Medical history  Family History  Social history  Allergies  Current Medications  Immunizations  Health Maintenance  Patient Care Team    Review of Systems   Constitutional:  Negative for activity change, chills, fever and unexpected weight change.   HENT:  Negative for congestion, sore throat and tinnitus.    Eyes:  Negative for photophobia, pain and redness.   Respiratory:  Negative for cough, shortness of breath and wheezing.    Cardiovascular: Negative.  Negative for chest pain, palpitations and leg swelling.   Gastrointestinal:  Negative for abdominal pain, constipation, diarrhea, nausea and vomiting.   Genitourinary:  Negative for difficulty urinating, dysuria and frequency.   Musculoskeletal:  Negative for neck pain.   Skin: Negative.  Negative for rash and wound.   Neurological:  Negative for dizziness, numbness and headaches.   Hematological:  Negative for adenopathy. Does not bruise/bleed easily.   Psychiatric/Behavioral:  Positive for dysphoric mood. Negative for self-injury and suicidal ideas. The patient is not nervous/anxious.      A comprehensive review of systems was conducted and is negative except as noted above.     Objective   Visit Vitals  /72 (BP Location: Right arm)   Pulse 78   Temp 96.8 °F (36 °C) (Temporal)   Ht 6' 0.99" (1.854 " [facility]  [address]  [facility] [address]  Dept: [phone]  Dept Fax: [phone]       Chetan Ortiz :1996 MRN:[id number]    2022 Time Session Began: 5:30 PM  Time Session Ended: 6:14 PM    This visit was performed via live interactive two-way Video visit with patient's verbal consent.   Clinician Location:Home  Patient Location: Home.  Verified patient identity:  [x] Yes    Session Type:Therapy 38-52 minutes (23838)    Others Present:       Intervention: Cognitive Behavioral, Supportive    Suicide/Homicide/Violence Ideation: No    If Yes, explain:       Current Outpatient Medications   Medication Sig    QUEtiapine 150 MG Tab Take 150 mg by mouth nightly.    amphetamine-dextroamphetamine (Adderall XR) 20 MG 24 hr capsule Take 1 capsule by mouth daily.    linaclotide (LINZESS) 145 MCG capsule Take 1 capsule by mouth daily (before breakfast).    guanFACINE 4 MG extended-release tablet TAKE 1 TABLET BY MOUTH DAILY    fluoxetine (PROzac) 40 MG capsule TAKE 1 CAPSULE BY MOUTH DAILY IN COMBINATION WITH 20 MG CAPSULE  FOR TOTAL OF 60 MG DAILY    propRANolol (INDERAL) 20 MG tablet TAKE 1 TABLET BY MOUTH TWICE  DAILY AND 1 ADDITIONAL TABLET  DAILY AS NEEDED FOR ANXIETY    FLUoxetine (PROzac) 20 MG capsule TAKE 1 CAPSULE BY MOUTH DAILY IN ADDITION TO THE 40 MG FOR TOTAL  OF 60 MG DAILY    senna (SENOKOT) 8.6 MG tablet Take 1 tablet by mouth nightly.     No current facility-administered medications for this visit.       Change in Medication(s) Reported: No  If Yes, explain:       Patient/Family Education Provided: Yes  Patient/Family Displays Understanding: Yes    If No, explain:       Chief complaint in patient's own words: \"\"I  am  still is experiencing visual overload, auditory overload, but not as bad as with previous injuries.\"    Progress Note containing chief complaint and symptoms/problems related to the complaint:    (Data/Action/Response/Plan)    D: Today's video visit is  m)   Wt 130.2 kg (287 lb)   SpO2 98%   BMI 37.88 kg/m²       Physical Exam  Vitals reviewed.   Constitutional:       General: He is not in acute distress.     Appearance: Normal appearance. He is obese. He is not ill-appearing.   HENT:      Right Ear: Tympanic membrane, ear canal and external ear normal.      Left Ear: Tympanic membrane, ear canal and external ear normal.      Mouth/Throat:      Mouth: Mucous membranes are moist.   Eyes:      General: No scleral icterus.     Extraocular Movements: Extraocular movements intact.      Conjunctiva/sclera: Conjunctivae normal.      Pupils: Pupils are equal, round, and reactive to light.   Cardiovascular:      Rate and Rhythm: Normal rate and regular rhythm.      Pulses: Normal pulses.      Heart sounds: Normal heart sounds.   Pulmonary:      Effort: Pulmonary effort is normal.      Breath sounds: Normal breath sounds.   Abdominal:      General: Bowel sounds are normal.      Palpations: Abdomen is soft.      Tenderness: There is no abdominal tenderness.   Musculoskeletal:         General: Normal range of motion.      Cervical back: Normal range of motion and neck supple. No tenderness.      Right lower leg: No edema.      Left lower leg: No edema.   Lymphadenopathy:      Cervical: No cervical adenopathy.   Skin:     General: Skin is warm and dry.   Neurological:      Mental Status: He is alert and oriented to person, place, and time. Mental status is at baseline.   Psychiatric:         Mood and Affect: Mood normal.         Judgment: Judgment normal.     Protective Sensation (w/ 10 gram monofilament):  Right: Intact  Left: Intact    Visual Inspection:  Normal -  Bilateral    Pedal Pulses:   Right: Present  Left: Present    Posterior Tibialis Pulses:   Right:Present  Left: Present        Assessment/Plan:  1. Medicare annual wellness visit, subsequent    2. Type 2 diabetes mellitus with hyperglycemia, with long-term current use of insulin  Overview:  On Farxiga, metformin,  patient's regularly scheduled weekly virtual psychotherapy appointment.    A: \"Tired.  I'm trying to do a bunch of other stuff.'\"  Patient had several tasks to do, but didn't get them done.  It was a laundry chore, involving large comforters.  It was a technology fail.  Another task, cleaning water bowls for the cats.  One is a fountain that had deposit build up, so took a long time.  Now, patient was just reminded that he has a dinner preparation task to complete.    Yesterday got better, after patient finally \"flipped a switch\".  He had been fighting things.  \"I surrendered in some way, and things got better\".    \"I  am  still is experiencing visual overload, auditory overload, but not as bad as with previous injuries.\"    Patient is going to  in person.  He got a ride.  His sponsor took patient last Friday.  This Friday Is a  for another members dad.  Patient led a group, virtually, in the dark.    \"I'm worried about the auditory nerve damage.  I keep hearing sounds afterward\".    R: Patient continues to experience anxiety about in terms of his concussion.  He is experiencing sensory overload d, and continues to worry that his impairments may become permanent    P: Patient is scheduled for his next virtual psychotherapy appointment in one week.    Need for Community Resources Assessed: Yes    Resources Needed: Yes    If Yes, what resources: AA    Primary Diagnosis: Mild episode of recurrent major depressive disorder (CMD)  (primary encounter diagnosis)  Alcohol use disorder, severe, in sustained remission (CMD)  ELMA (generalized anxiety disorder)  Adjustment disorder with mixed emotional features  ADHD (attention deficit hyperactivity disorder), combined type    Treatment Plan: Unchanged    Discharge Plan: Titrate Sessions    Next Appointment: 2022  5:30 PM        Luli Mckeon LPC  [facility]   saxagliptin, Trulicity, Toujeo per Endocrinology    Assessment & Plan:  Lab Results   Component Value Date    HGBA1C 11.7 (H) 12/04/2024    HGBA1C 10.1 (H) 07/03/2024      Continue current medications   On ACE and Statin according to guidelines  Follow ADA Diet. Avoid soda, simple sweets, and limit rice/pasta/breads/starches  Maintain healthy weight with goal BMI <30.  Exercise 5 times per week for 30 minutes per day  Stressed importance of daily foot exams  Educated on importance of annual dilated eye exam      Orders:  -     Microalbumin/creatinine urine ratio; Future; Expected date: 01/13/2025    3. Mixed hyperlipidemia  Overview:  On Vascepa and atorvastatin    Assessment & Plan:  Lab Results   Component Value Date    CHOL 99 12/04/2024    HDL 25 (L) 12/04/2024    LDLDIRECT <30.0 (L) 12/04/2024    TRIG 425 (H) 12/04/2024     Continue atorvastatin.  Increase Vascepa to 2 g BID with meals   LDL Goal: less than 70  Educated on dietary modifications. Follow a low cholesterol, low saturated fat.  Avoid fried foods and high saturated fats.  Increase dietary fiber.  Regular exercise can reduce LDL (bad cholesterol) and raise HDL (good cholesterol). Encourage physical activity 5 times per week for 30 minutes per day.      Orders:  -     VASCEPA 1 gram Cap; Take 2 capsules (2,000 mg total) by mouth 2 (two) times a day.  Dispense: 360 capsule; Refill: 3    4. Morbid (severe) obesity due to excess calories  Assessment & Plan:  Body mass index is 37.88 kg/m².  Goal BMI <30.  Exercise 5 times a week for 30 minutes per day.  Avoid soda, simple sugars, excessive rice, potatoes or bread. Limit fast foods and fried foods.  Choose complex carbs in moderation (example: green vegetables, beans, oatmeal). Eat plenty of fresh fruits and vegetables with lean meats daily.  Eat a balanced portion size.  Consider permanent healthy life style changes.        5. Mild episode of recurrent major depressive disorder  Overview:  On  citalopram    Assessment & Plan:  Increase citalopram to 20 mg daily   Begin counseling  Exercise daily. Get sunlight daily.  Practice positive phrases and repeat throughout the day, along with yoga and relaxation techniques.  Establish good social support, make changes to reduce stress.  Encourage counseling.   Reports any symptoms of suicidal/homicidal ideations or self harm immediately. If clinic is closed, go to nearest emergency room.       Orders:  -     citalopram (CELEXA) 20 MG tablet; Take 1 tablet (20 mg total) by mouth once daily.  Dispense: 30 tablet; Refill: 0      A comprehensive HEALTH RISK ASSESSMENT was completed today. Results are summarized below:    There are NO EMOTIONAL/SOCIAL CONCERNS identified on today's screening for Social Isolation, Depression and Anxiety.    There are NO COGNITIVE FUNCTION CONCERNS identified on today's screening.    There are NO FUNCTIONAL OR SAFETY CONCERNS were identified on today's screening for Physical Symptoms, Nutritional, Cognitive Function, Home Safety/Living Situation, Fall Risk, Activities of Daily Living, Independent Activities of Daily Living, Physical Activity, Timed Up and Go test and Whisper test.     The patient reports NO OPIOID PRESCRIPTIONS. This was confirmed through medication reconciliation and the [facility] website.    The patient is NOT A TOBACCO USER.  The patient reports NO SIGNIFICANT ALCOHOL USE.     All Questions regarding food, transportation or housing were not answered today.      I provided Michael Malone with a 5-10 year written Screening Schedule per USPSTF age appropriate recommendations and a Personal Prevention Plan based on the results of today's Health Risk Assessment. Education, counseling, and referrals were provided as documented above and can be viewed in the After Visit Summary.    Follow up in about 4 weeks (around 2/10/2025) for depression. In addition to this scheduled follow up, the patient has also been instructed to  follow up on as needed basis.     A separate E/M code has been provided to evaluate additional complaints that the patient would like addressed during the dedicated Medicare Wellness Exam.  The patient was asked and declined the use of a free .  Advance Care Planning   Today we discussed advance care planning. He is interested in learning more about how to make Advance Directives. Information was provided and I offered to discuss more at his discretion.

## 2025-01-13 NOTE — ASSESSMENT & PLAN NOTE
Body mass index is 37.88 kg/m².  Goal BMI <30.  Exercise 5 times a week for 30 minutes per day.  Avoid soda, simple sugars, excessive rice, potatoes or bread. Limit fast foods and fried foods.  Choose complex carbs in moderation (example: green vegetables, beans, oatmeal). Eat plenty of fresh fruits and vegetables with lean meats daily.  Eat a balanced portion size.  Consider permanent healthy life style changes.

## 2025-01-13 NOTE — ASSESSMENT & PLAN NOTE
Lab Results   Component Value Date    HGBA1C 11.7 (H) 12/04/2024    HGBA1C 10.1 (H) 07/03/2024      Continue current medications   On ACE and Statin according to guidelines  Follow ADA Diet. Avoid soda, simple sweets, and limit rice/pasta/breads/starches  Maintain healthy weight with goal BMI <30.  Exercise 5 times per week for 30 minutes per day  Stressed importance of daily foot exams  Educated on importance of annual dilated eye exam

## 2025-01-13 NOTE — PATIENT INSTRUCTIONS
Medicare Annual Wellness Visit      Patient Name: Michael Malone  Today's Date: 1/13/2025    Below you will find your 5-10 year Screening Plan Recommendations:  Health Maintenance       Date Due Completion Date    Foot Exam Never done ---    Diabetes Urine Screening 06/06/2024 6/6/2023    Pneumococcal Vaccines (Age 0-49) (1 of 2 - PCV) 07/16/2025 (Originally 8/22/2014) ---    Hemoglobin A1c 03/04/2025 12/4/2024    Diabetic Eye Exam 08/06/2025 8/6/2024    Lipid Panel 12/04/2025 12/4/2024    TETANUS VACCINE 02/09/2029 2/9/2019    RSV Vaccine (Age 60+ and Pregnant patients) (1 - 1-dose 75+ series) 08/22/2070 ---          Below is your summarized Personalized Prevention Plan that addresses any concerns we discussed today at your visit. Please see attached detailed information specific to your Health Concerns.  Orders Placed This Encounter   Procedures    Microalbumin/creatinine urine ratio           The following information is provided to all patients.  This information is to help you find additional resources for any problems that may be affecting your health: Living healthy guide: www.[email or web address].[email or web address]      Understanding Diabetes: www.diabetes.org      Eating healthy: www.cdc.gov/healthyweight      CDC home safety checklist: www.cdc.gov/steadi/patient.html      Agency on Aging: www.goea.[place].[facility]      Alcoholics anonymous (AA): www.aa.org      Physical Activity: www.rah.nih.gov/re8jvfg      Tobacco use: www.quitwithusla.org                                 Patient Education       Weight Loss Tips   About this topic   More and more people are concerned about their weight. You can choose from many different programs. The goal of a weight loss program may be to cut down on calories or to lose extra weight through exercise.  Losing weight may mean changing your ideas about food. Going on a diet and losing weight does not mean starving yourself. It means cutting down on the amount of food you eat, making healthy food  "choices, and being active.  General   Ideally, you need to lose 1 to 2 pounds (0.5 to 1 kg) a week for a healthy weight loss. Losing too much weight too fast is not good. When you take in fewer calories, you will lose weight. Your ideal calorie and weight goal depends on your current age, weight, height, and personal goals. Ask your doctor or dietitian what your ideal weight is.  You need to burn 3500 calories to lose 1 pound (0.5 kg). That means cutting out 500 calories every day for 7 days. You can cut out 500 calories per day by eating or drinking fewer calories, burning them through exercise, or doing both. To lose that extra weight and stay healthy:  Take time to exercise.  Exercise regularly. Burn calories with activity and exercise. Exercise can help you lose weight and it also strengthens your muscles. Set a schedule where you will have time to do exercises. With just 30 to 60 minutes of exercise each day, you could burn 500 extra calories. Your metabolism stays elevated for a period of time after exercise.  If you don't have time for a 30 minute workout, try three 10 minute exercises each day.  If you work near your home, walk to work. Walking is a very good form of exercise.  Take a 20 minute walk each day. Walk during your lunch break. Park far away, so you have to walk more.  Take the steps instead of elevators. You will burn more calories this way.  If you have an illness, like diabetes or high blood pressure, ask your doctor how much exercise is right for you.  Choose healthy snacks.  Low calorie healthy snacks are a good thing. They help your blood sugar stay even and prevent you from overeating at meals. Choose a balanced snack, such as a small apple with 2 tablespoons (30 grams) of peanut butter.  Keep in mind, even "low calorie" foods can add up. Just because you choose low calorie foods does not mean you do not have to count the calories you eat.  Pack a few fresh fruits or a small salad to take " to work or school. Avoid buying a snack at the nearest vending machine.  When you feel thirsty, drink water. Water has no calories and is a very good thirst quencher.  Plan healthy meals.  Plan ahead. Keep a diary of foods that are low in calories. You can also make a list of meal plans for your breakfast, lunch, and dinner. Planning ahead will prevent you from eating out at a fast food place or restaurant.  Make a grocery list before shopping so you only buy food you need. Don't go to the store hungry.  Visit a dietitian. This person will help you make meal plans that will help you lose weight.  Add fiber to your meals. Adding fiber helps you to feel full for a longer amount of time.  Take care when eating out.  Choose lower fat and lower calorie meals. Try a seafood, lean meat, or vegetarian entrée.  Share a meal with a friend.  Try a salad and appetizer instead of an entree.  Ask for a to-go box when dinner is served and put half of your meal in it for a later meal.  Have fruit for dessert.  Drink water instead of other high calorie drinks.  Learn not to overeat.  Watch your portions. For example, the recommended serving size of meat is 3 ounces (90 grams). This is the size of a deck of playing cards. Two tablespoons (30 grams) of peanut butter is the size of a ping-pong ball. One medium fruit is the size of a baseball.  Use a smaller plate or glass during dinner for less calorie intake.  Try drinking a glass or two of water before eating. This may make you feel more full and help you to eat less.  Eat slowly. Take at least 30 minutes to eat. This gives time for your brain to tell your stomach you are full. This will help you avoid overeating.  Some people eat smaller meals more often to help not to overeat. If you can eat six small meals, make them healthy and low calorie. If three meals are best for you, know your calorie level for the day and spread it out into three healthy low calorie meals.     What will  the results be?   Losing weight may make you healthier. You also may have more energy for your daily activities. You may lower disease risk. You may also add years to your life.  What changes to diet are needed?   Learn how to read nutrition labels. Know the serving size. Knowing the calories in an item will help you make healthier choices and lose weight.  Keep a diary of the food you eat. This will help you count the calories you are taking in.  Make a menu in advance. This will help you make good choices to include in your diet.  Avoid eating 2 hours before bedtime to allow for digestion. If you eat right before you go to bed, you may also have worse heartburn.  Be sure to count the calories in the things you drink. You may want to stop drinking soda pop, beer, wine, and mixed drinks (alcohol). Some coffee drinks also have a lot of calories in them.  Who should use this diet?   A weight loss diet may be needed for people with a calculated body mass index of 25 and over. This means you are overweight or obese.  Who should not use this diet?   People with BMI of 18.5 or lower should not use this diet. Do not use this diet if your doctor does not recommend weight loss.  What foods are good to eat?   Choose foods that are nutritious. Remember, portion control is key. Even a low calorie food can become high in calories if you have too big of a serving. Here is a list of foods that are good to eat:  Vegetables:   Broccoli  Asparagus  Spinach  Green leafy vegetables  Tomatoes  Onions  Mushrooms  Cucumbers  Zucchini  Lean proteins:   Egg whites  Beans including kidney, navy, black, and chickpeas  Grilled, broiled, or baked skinless chicken breast  Grilled, broiled, or baked skinless turkey breast  Lean beef  [place] meat  Grilled, broiled, or baked fish  Beans  Nuts, such as almonds, cashews, and pistachios  Seeds  Whole grains and carbs:   Oatmeal  Brown rice  Sweet potatoes  Cereal  Whole grain bread or pasta  Fruits:    Apples  Grapefruit  Blueberries  Oranges  Bananas  Grapes  Peaches  Pineapple  Strawberries  Dairy:   Fat-free or low-fat milk and cheese  Low fat yogurt  Soy, rice, or almond milk  What foods should be limited or avoided?   Limit or avoid foods that are high in calories like:  Junk foods  Fried foods  Fatty foods  Processed meats  Food with saturated and trans fat  Whole fat dairy products  Butter  Cheese  Ice cream  Food and drinks with a lot of sugar. Some examples are beer, wine, mixed drinks (alcohol), carbonated sodas, cakes, and cookies.  When do I need to call the doctor?   Weakness  Fast heartbeat  Dizziness  Helpful tips   Join a support group and an exercise group. It is much easier to lose weight if you have support and encouragement.  Do not skip meals. If you skip a meal, you most likely will overeat at that next meal.  Eat at the dining room table instead in front of the TV to help monitor intake.  Where can I learn more?   Academy of Nutrition and Dietetics  https://www.eatright.org/health/weight-loss/your-health-and-your-weight/back-to-basics-for-healthy-weight-loss   Centers for Disease Control and Prevention  https://www.cdc.gov/healthyweight/   Weight-Control Information Network  https://www.niddk.nih.gov/health-information/diet-nutrition/changing-habits-better-health   Last Reviewed Date   2021-08-09  Consumer Information Use and Disclaimer   This information is not specific medical advice and does not replace information you receive from your health care provider. This is only a brief summary of general information. It does NOT include all information about conditions, illnesses, injuries, tests, procedures, treatments, therapies, discharge instructions or life-style choices that may apply to you. You must talk with your health care provider for complete information about your health and treatment options. This information should not be used to decide whether or not to accept your health care  providers advice, instructions or recommendations. Only your health care provider has the knowledge and training to provide advice that is right for you.  Copyright   Copyright © 2021 UpToDate, Inc. and its affiliates and/or licensors. All rights reserved.    Patient Education       Health Risks of a High BMI   About this topic   Your weight and health depend on a few things. Doctors use a method called BMI or body mass index as a tool to learn more about your risk of having health problems. This tool uses your weight and your height to find your BMI.  BMI does not include things like your habits, where you live, family history, or amount of body fat. Some people with a normal BMI are still not healthy. Other people with a high BMI may be healthy. Most of the time, a person with a higher BMI is less healthy and will need more care. Ask your doctor for their view of your total health during a well visit or physical.  Being overweight or having a high BMI can hurt many parts of your body. Learn about your BMI and how your weight changes your health risks. Then you can make changes to keep yourself as healthy as you can.  General   Weighing too much can be very harmful to your body. It can cause many illnesses and can make it hard to move about.  Blood Sugar   You have a greater chance of having high blood sugar or diabetes if you weigh too much. Your body normally makes a hormone called insulin. The insulin allows your body to use the sugar in your blood.  The cells in your body may not be able to use insulin. Then your cells cannot get the sugar from your bloodstream that they need for energy. Your pancreas has to work extra hard to try and make enough insulin to keep your blood sugar healthy.  If you lose weight and exercise, your body is able to control your blood sugar levels better. You may not need as much insulin to keep your blood sugar levels healthy.  Your Heart   Being overweight makes your heart work  harder.  The blood vessels that bring blood to your heart muscle may become narrow or blocked and cause a heart attack or your heart may not pump as well as it should. Your heart rate may not be normal.  Losing weight can lower your chances of having problems with your heart.  High Blood Pressure   Your heart has to work harder to pump blood through a larger body and to make sure all of your cells have the oxygen they need.  As your heart has to work harder, your blood pressure goes up.  Being overweight can also harm your kidneys and this may also raise your blood pressure.  You may be able to lower your blood pressure through weight loss and routine exercise.  Stroke   Strokes are more likely to happen when someone has high blood pressure, heart problems, high blood sugar, or high cholesterol.  Being overweight puts you at a higher risk for all of these health problems. These problems put you at a higher risk for having a stroke.  Losing weight may help lower your blood pressure, which is the biggest risk factor for a stroke.  Cholesterol   Your cholesterol level is likely to be higher if you are overweight.  This can lead to narrowing of the blood vessels in your heart, neck, or other parts of your body. Then you may have chest pain or signs of low blood flow to a certain area. It can also lead to a heart attack or stroke.  Lowering your weight and changing what you eat may change your cholesterol levels.  Your Liver and Kidneys   You are more likely to have certain problems with your liver or kidneys if you have a high BMI.  Fatty liver disease is caused by a buildup of fat in your liver. Then your liver may not work as well as it should.  You are at a higher risk for diabetes if you are overweight. This illness can cause kidney problems.  There is no exact way to treat fatty liver disease. By losing weight, you may keep your liver from getting any worse and help your liver work better.  By losing weight, you  lower your chance of having kidney problems. If you already have kidney problems, weight loss may help keep your disease from getting worse.  Bone and Joint Problems   Weighing too much can put a lot of stress on your joints.  The extra weight may make the cartilage wear away more quickly from your bones. Your cartilage lines the surfaces of your bones to help your joints glide more easily.  When your cartilage is worn, your joints become stiff and sore.  Losing weight and exercising is one of the best ways to treat joint pain and stiffness. It can also ease the stress on your hips, knees, and back.  Cancer Risk   Gaining weight and poor health habits raise your risk for some kinds of cancer.  Healthy eating and exercise may lower your risk for some kinds of cancer.  Sleep   With a high BMI, you may have extra fat around your neck. This can make your airway smaller and put you at risk for a problem called sleep apnea. With this problem, your breathing starts and stops when you are sleeping.  Signs of sleep apnea include snoring, feeling sleepy during the day, and problems with focusing.  Sleep apnea can lead to heart problems such as increased risk for heart disease and arrhythmias like atrial fibrillation.  Losing weight can lower the amount of fat around your neck and ease sleep apnea problems.  Pregnancy   Your weight can affect how often you have a period. It may also make it harder for you to get pregnant. A high BMI can cause problems for both mom and baby.  If you are overweight and pregnant you are at a higher risk for high blood sugar or high blood pressure while you are pregnant.  You are also more likely to have your baby early or to need a C section.  Losing weight before you become pregnant may lower your chance for these problems. If you are pregnant, talk to your doctor before you try to lose weight.  Depression   You are more likely to suffer from depression or low mood when you have a high BMI.  You  may have a low mood because of low self-esteem, lack of activity, lack of sleep, and health problems caused by being overweight.  Losing weight and finding out the reasons you overeat are some of the steps to improve your quality of life and deal with depression.  Where can I learn more?   National [place] of Diabetes and Digestive and Kidney Diseases  https://www.niddk.nih.gov/health-information/weight-management/adult-overweight-obesity/health-risks   Last Reviewed Date   2021-09-15  Consumer Information Use and Disclaimer   This information is not specific medical advice and does not replace information you receive from your health care provider. This is only a brief summary of general information. It does NOT include all information about conditions, illnesses, injuries, tests, procedures, treatments, therapies, discharge instructions or life-style choices that may apply to you. You must talk with your health care provider for complete information about your health and treatment options. This information should not be used to decide whether or not to accept your health care providers advice, instructions or recommendations. Only your health care provider has the knowledge and training to provide advice that is right for you.  Copyright   Copyright © 2021 UpToDate, Inc. and its affiliates and/or licensors. All rights reserved.

## 2025-01-13 NOTE — ASSESSMENT & PLAN NOTE
Lab Results   Component Value Date    CHOL 99 12/04/2024    HDL 25 (L) 12/04/2024    LDLDIRECT <30.0 (L) 12/04/2024    TRIG 425 (H) 12/04/2024     Continue atorvastatin.  Increase Vascepa to 2 g BID with meals   LDL Goal: less than 70  Educated on dietary modifications. Follow a low cholesterol, low saturated fat.  Avoid fried foods and high saturated fats.  Increase dietary fiber.  Regular exercise can reduce LDL (bad cholesterol) and raise HDL (good cholesterol). Encourage physical activity 5 times per week for 30 minutes per day.

## 2025-01-25 ENCOUNTER — HOSPITAL ENCOUNTER (EMERGENCY)
Facility: HOSPITAL | Age: 30
Discharge: HOME | End: 2025-01-25
Payer: MEDICARE

## 2025-01-25 VITALS
HEART RATE: 86 BPM | HEIGHT: 73 IN | WEIGHT: 287 LBS | RESPIRATION RATE: 18 BRPM | TEMPERATURE: 98 F | SYSTOLIC BLOOD PRESSURE: 155 MMHG | BODY MASS INDEX: 38.04 KG/M2 | DIASTOLIC BLOOD PRESSURE: 104 MMHG | OXYGEN SATURATION: 98 %

## 2025-01-25 DIAGNOSIS — J06.9 VIRAL URI WITH COUGH: Primary | ICD-10-CM

## 2025-01-25 DIAGNOSIS — R05.9 COUGH: ICD-10-CM

## 2025-01-25 PROCEDURE — 99283 EMERGENCY DEPT VISIT LOW MDM: CPT | Mod: 25

## 2025-01-26 NOTE — ED PROVIDER NOTES
Encounter Date: 1/25/2025       History     Chief Complaint   Patient presents with    Cough     Pt reports that he has been sneezing and blood and snot will come out and coughing for 4 days    Sneezing     29-year-old male presents with sneezing, runny nose and coughing x4 days.     The history is provided by the patient.     Review of patient's allergies indicates:   Allergen Reactions    Codeine      Past Medical History:   Diagnosis Date    Anxiety     Autism     Diabetes mellitus     Diabetes mellitus, type 2     Hyperlipidemia      Past Surgical History:   Procedure Laterality Date    ADENOIDECTOMY      TONSILLECTOMY      WRIST SURGERY       Family History   Problem Relation Name Age of Onset    Hypertension Mother      Hyperlipidemia Mother      Breast cancer Mother      Anxiety disorder Mother      Depression Mother      Hypertension Father      Kidney failure Father      Heart failure Father      Diabetes type II Father      Rheum arthritis Father      Hyperlipidemia Father       Social History     Tobacco Use    Smoking status: Never    Smokeless tobacco: Never   Substance Use Topics    Alcohol use: Not Currently    Drug use: Never     Review of Systems   Constitutional:  Negative for activity change, appetite change, chills, diaphoresis, fatigue and fever.   HENT:  Positive for congestion, postnasal drip, rhinorrhea, sinus pressure and sneezing. Negative for ear discharge, ear pain, facial swelling, sinus pain, sore throat and trouble swallowing.    Respiratory:  Positive for cough. Negative for apnea, choking, chest tightness, shortness of breath, wheezing and stridor.    Musculoskeletal:  Negative for arthralgias, back pain, gait problem, joint swelling, myalgias, neck pain and neck stiffness.   All other systems reviewed and are negative.      Physical Exam     Initial Vitals [01/25/25 2047]   BP Pulse Resp Temp SpO2   (!) 166/100 98 18 98 °F (36.7 °C) 98 %      MAP       --         Physical  Exam    Nursing note and vitals reviewed.  Constitutional: He appears well-developed and well-nourished.   HENT:   Head: Normocephalic and atraumatic.   Right Ear: Hearing, tympanic membrane, external ear and ear canal normal.   Left Ear: Hearing, tympanic membrane, external ear and ear canal normal.   Nose: Rhinorrhea present. No mucosal edema, sinus tenderness, nasal deformity, septal deviation or nasal septal hematoma. No epistaxis.  No foreign bodies. Right sinus exhibits no maxillary sinus tenderness and no frontal sinus tenderness. Left sinus exhibits no maxillary sinus tenderness and no frontal sinus tenderness. Mouth/Throat: Uvula is midline, oropharynx is clear and moist and mucous membranes are normal.   Eyes: Conjunctivae and EOM are normal.   Neck: Neck supple.   Normal range of motion.  Cardiovascular:  Normal rate, regular rhythm, normal heart sounds and intact distal pulses.           Pulmonary/Chest: Breath sounds normal.   Abdominal: Abdomen is soft. Bowel sounds are normal.   Musculoskeletal:         General: Normal range of motion.      Cervical back: Normal range of motion and neck supple.     Neurological: He is alert and oriented to person, place, and time. He has normal strength and normal reflexes. GCS score is 15. GCS eye subscore is 4. GCS verbal subscore is 5. GCS motor subscore is 6.   Skin: Skin is warm and dry. Capillary refill takes less than 2 seconds.   Psychiatric: He has a normal mood and affect. His behavior is normal. Judgment and thought content normal.         ED Course   Procedures  Labs Reviewed - No data to display       Imaging Results              X-Ray Chest PA And Lateral (Preliminary result)  Result time 01/25/25 21:19:09      Wet Read by Edgardo Bhatt MD (01/25/25 21:19:09, [facility], Emergency Medicine)    Lungs are clear                                     Medications - No data to display  Medical Decision Making  29-year-old male  presents with sneezing, runny nose and coughing x4 days.         Amount and/or Complexity of Data Reviewed  Radiology: ordered and independent interpretation performed.    Risk  Risk Details: Follow up with primary care provider in 1-2 days for recheck.                          Medical Decision Making:   Differential Diagnosis:   URI, Sinusitis             Clinical Impression:  Final diagnoses:  [R05.9] Cough  [J06.9] Viral URI with cough (Primary)          ED Disposition Condition    Discharge Stable          ED Prescriptions    None       Follow-up Information       Follow up With Specialties Details Why Contact Info    Maribell Witt FNP Family Medicine Schedule an appointment as soon as possible for a visit   [address] [place] [address]  [phone]      [facility] Emergency Medicine  If symptoms worsen [address] [place] [id number] [phone]             Sheng Bradford FNP  01/25/25 2129

## 2025-01-30 ENCOUNTER — HOSPITAL ENCOUNTER (EMERGENCY)
Facility: HOSPITAL | Age: 30
Discharge: HOME | End: 2025-01-30
Payer: MEDICARE

## 2025-01-30 VITALS
RESPIRATION RATE: 16 BRPM | HEART RATE: 75 BPM | BODY MASS INDEX: 34.46 KG/M2 | DIASTOLIC BLOOD PRESSURE: 95 MMHG | OXYGEN SATURATION: 98 % | WEIGHT: 260 LBS | SYSTOLIC BLOOD PRESSURE: 137 MMHG | HEIGHT: 73 IN | TEMPERATURE: 98 F

## 2025-01-30 DIAGNOSIS — S90.852A FOREIGN BODY IN LEFT FOOT: ICD-10-CM

## 2025-01-30 PROCEDURE — 10120 INC&RMVL FB SUBQ TISS SMPL: CPT

## 2025-01-30 PROCEDURE — 25000003 PHARM REV CODE 250: Performed by: NURSE PRACTITIONER

## 2025-01-30 PROCEDURE — 99283 EMERGENCY DEPT VISIT LOW MDM: CPT | Mod: 25

## 2025-01-30 PROCEDURE — 63600175 PHARM REV CODE 636 W HCPCS: Performed by: NURSE PRACTITIONER

## 2025-01-30 RX ORDER — LIDOCAINE HYDROCHLORIDE 10 MG/ML
1 INJECTION, SOLUTION INFILTRATION; PERINEURAL ONCE
Status: COMPLETED | OUTPATIENT
Start: 2025-01-30 | End: 2025-01-30

## 2025-01-30 RX ADMIN — BACITRACIN ZINC, NEOMYCIN, POLYMYXIN B SULFAT 1 EACH: 5000; 3.5; 4 OINTMENT TOPICAL at 05:01

## 2025-01-30 RX ADMIN — LIDOCAINE HYDROCHLORIDE 1 ML: 10 INJECTION, SOLUTION INFILTRATION; PERINEURAL at 05:01

## 2025-01-30 NOTE — Clinical Note
"Michael Pryorhoracio Malone was seen and treated in our emergency department on 1/30/2025.  He may return to work on 01/31/2025.       If you have any questions or concerns, please don't hesitate to call.      Radha Bond, STACEY"

## 2025-01-30 NOTE — ED PROVIDER NOTES
Encounter Date: 1/30/2025       History     Chief Complaint   Patient presents with    Foot Injury     Right heel, possible fb, unknown injury  - pain x 3 days - 3mm callused skin to plantar aspect of right heel     29 year old male presents with a possible piece of glass or wood in the right heel.      The history is provided by the patient. No  was used.     Review of patient's allergies indicates:   Allergen Reactions    Codeine      Past Medical History:   Diagnosis Date    Anxiety     Autism     Diabetes mellitus     Diabetes mellitus, type 2     Hyperlipidemia      Past Surgical History:   Procedure Laterality Date    ADENOIDECTOMY      TONSILLECTOMY      WRIST SURGERY       Family History   Problem Relation Name Age of Onset    Hypertension Mother      Hyperlipidemia Mother      Breast cancer Mother      Anxiety disorder Mother      Depression Mother      Hypertension Father      Kidney failure Father      Heart failure Father      Diabetes type II Father      Rheum arthritis Father      Hyperlipidemia Father       Social History     Tobacco Use    Smoking status: Never    Smokeless tobacco: Never   Substance Use Topics    Alcohol use: Not Currently     Comment: occ    Drug use: Never     Review of Systems   Musculoskeletal:  Positive for gait problem.   Skin:  Positive for wound.   All other systems reviewed and are negative.      Physical Exam     Initial Vitals [01/30/25 1554]   BP Pulse Resp Temp SpO2   (!) 137/95 75 16 98.4 °F (36.9 °C) 98 %      MAP       --         Physical Exam    Nursing note and vitals reviewed.  Constitutional: He appears well-developed and well-nourished.   HENT:   Head: Normocephalic and atraumatic. Mouth/Throat: Mucous membranes are normal.   Eyes: EOM are normal. Pupils are equal, round, and reactive to light.   Neck: Neck supple.   Normal range of motion.  Cardiovascular:  Normal rate, regular rhythm, normal heart sounds and intact distal pulses.            Pulmonary/Chest: Breath sounds normal.   Abdominal: Abdomen is soft. Bowel sounds are normal.   Musculoskeletal:         General: Normal range of motion.      Cervical back: Normal range of motion and neck supple.     Neurological: He is alert and oriented to person, place, and time. He has normal strength.   Skin: Skin is warm and dry. Capillary refill takes less than 2 seconds.   Psychiatric: He has a normal mood and affect. His behavior is normal. Judgment and thought content normal.         ED Course   Foreign Body    Date/Time: 1/30/2025 3:37 PM    Performed by: Radha Bond FNP  Authorized by: Radha Bond FNP  Consent Done: Not Needed  Body area: skin  General location: lower extremity  Location details: right foot  Anesthesia: local infiltration    Anesthesia:  Local Anesthetic: lidocaine 1% without epinephrine  Anesthetic total: 1 mL    Patient sedated: no  Patient restrained: no  Patient cooperative: yes  Localization method: visualized  Removal mechanism: scalpel  Dressing: antibiotic ointment and dressing applied  Tendon involvement: none  Depth: subcutaneous  Complexity: simple  1 objects recovered.  Objects recovered: glass  Post-procedure assessment: foreign body removed  Patient tolerance: Patient tolerated the procedure well with no immediate complications      Labs Reviewed - No data to display       Imaging Results              X-Ray Foot Complete Right (Final result)  Result time 01/30/25 16:34:35      Final result by Adi Bennett MD (01/30/25 16:34:35)                   Impression:    Impression:    1.   No acute foot pathology.      Electronically signed by: Adi Bennett  Date:    01/30/2025  Time:    16:34               Narrative:    EXAMINATION:  XR FOOT COMPLETE 3 VIEW RIGHT    CLINICAL HISTORY:  . Superficial foreign body, left foot, initial encounter    TECHNIQUE:  AP, lateral, and oblique views of the right foot were performed.    COMPARISON:  None    FINDINGS:  No fracture or  dislocation. Normal alignment.  No acute soft tissue pathology.  No radiopaque foreign body is identified.                                       Medications   LIDOcaine HCL 10 mg/ml (1%) injection 1 mL (1 mL Other Given 1/30/25 1717)   neomycin-bacitracnZn-polymyxnB packet (1 each Topical (Top) Given 1/30/25 1717)     Medical Decision Making  Problems Addressed:  Foreign body in left foot: acute illness or injury    Amount and/or Complexity of Data Reviewed  Radiology: ordered. Decision-making details documented in ED Course.    Risk  OTC drugs.  Prescription drug management.                                      Clinical Impression:  Final diagnoses:  [V75.628S] Foreign body in left foot          ED Disposition Condition    Discharge Stable          ED Prescriptions    None       Follow-up Information       Follow up With Specialties Details Why Contact Info    Maribell Witt FNP Family Medicine In 3 days If symptoms worsen [address] [place] [id number]  [phone]               Radha Bond FNP  01/30/25 1720       Radha Bond FNP  01/30/25 1728       Radha Bond FNP  01/30/25 1724

## 2025-02-12 ENCOUNTER — OFFICE VISIT (OUTPATIENT)
Dept: FAMILY MEDICINE | Facility: CLINIC | Age: 30
End: 2025-02-12
Payer: MEDICARE

## 2025-02-12 VITALS
OXYGEN SATURATION: 97 % | WEIGHT: 278 LBS | HEIGHT: 73 IN | DIASTOLIC BLOOD PRESSURE: 80 MMHG | BODY MASS INDEX: 36.84 KG/M2 | HEART RATE: 108 BPM | SYSTOLIC BLOOD PRESSURE: 132 MMHG | TEMPERATURE: 97 F

## 2025-02-12 DIAGNOSIS — E78.2 MIXED HYPERLIPIDEMIA: ICD-10-CM

## 2025-02-12 DIAGNOSIS — E11.65 TYPE 2 DIABETES MELLITUS WITH HYPERGLYCEMIA, WITH LONG-TERM CURRENT USE OF INSULIN: ICD-10-CM

## 2025-02-12 DIAGNOSIS — F31.9 BIPOLAR AFFECTIVE DISORDER, REMISSION STATUS UNSPECIFIED: ICD-10-CM

## 2025-02-12 DIAGNOSIS — F33.0 MILD EPISODE OF RECURRENT MAJOR DEPRESSIVE DISORDER: Primary | ICD-10-CM

## 2025-02-12 DIAGNOSIS — Z79.4 TYPE 2 DIABETES MELLITUS WITH HYPERGLYCEMIA, WITH LONG-TERM CURRENT USE OF INSULIN: ICD-10-CM

## 2025-02-12 LAB
CREAT UR-MCNC: 46 MG/DL (ref 63–166)
MICROALBUMIN UR-MCNC: <5 UG/ML
MICROALBUMIN/CREAT RATIO PNL UR: ABNORMAL

## 2025-02-12 PROCEDURE — 1160F RVW MEDS BY RX/DR IN RCRD: CPT | Mod: ,,, | Performed by: NURSE PRACTITIONER

## 2025-02-12 PROCEDURE — 82043 UR ALBUMIN QUANTITATIVE: CPT | Performed by: NURSE PRACTITIONER

## 2025-02-12 PROCEDURE — 3075F SYST BP GE 130 - 139MM HG: CPT | Mod: ,,, | Performed by: NURSE PRACTITIONER

## 2025-02-12 PROCEDURE — 1159F MED LIST DOCD IN RCRD: CPT | Mod: ,,, | Performed by: NURSE PRACTITIONER

## 2025-02-12 PROCEDURE — 3079F DIAST BP 80-89 MM HG: CPT | Mod: ,,, | Performed by: NURSE PRACTITIONER

## 2025-02-12 PROCEDURE — 3008F BODY MASS INDEX DOCD: CPT | Mod: ,,, | Performed by: NURSE PRACTITIONER

## 2025-02-12 PROCEDURE — 99214 OFFICE O/P EST MOD 30 MIN: CPT | Mod: ,,, | Performed by: NURSE PRACTITIONER

## 2025-02-12 RX ORDER — CITALOPRAM 20 MG/1
20 TABLET, FILM COATED ORAL DAILY
Qty: 30 TABLET | Refills: 11 | Status: SHIPPED | OUTPATIENT
Start: 2025-02-12 | End: 2026-02-12

## 2025-02-12 RX ORDER — INSULIN GLARGINE 300 U/ML
1.5 INJECTION, SOLUTION SUBCUTANEOUS DAILY
COMMUNITY
Start: 2025-01-30

## 2025-02-12 RX ORDER — ICOSAPENT ETHYL 1 G/1
2 CAPSULE ORAL 2 TIMES DAILY
Qty: 360 CAPSULE | Refills: 3 | Status: SHIPPED | OUTPATIENT
Start: 2025-02-12 | End: 2026-02-12

## 2025-02-12 RX ORDER — ARIPIPRAZOLE 5 MG/1
5 TABLET ORAL DAILY
Qty: 30 TABLET | Refills: 11 | Status: SHIPPED | OUTPATIENT
Start: 2025-02-12 | End: 2026-02-12

## 2025-02-12 RX ORDER — ICOSAPENT ETHYL 1000 MG/1
2 CAPSULE ORAL 2 TIMES DAILY
Qty: 360 CAPSULE | Refills: 3 | Status: SHIPPED | OUTPATIENT
Start: 2025-02-12 | End: 2025-02-12

## 2025-02-12 NOTE — ASSESSMENT & PLAN NOTE
Continue citalopram   List of counselors given  Exercise daily. Get sunlight daily.  Practice positive phrases and repeat throughout the day, along with yoga and relaxation techniques.  Establish good social support, make changes to reduce stress.  Encourage counseling.   Reports any symptoms of suicidal/homicidal ideations or self harm immediately. If clinic is closed, go to nearest emergency room.

## 2025-02-12 NOTE — ASSESSMENT & PLAN NOTE
Lab Results   Component Value Date    HGBA1C 11.7 (H) 12/04/2024    HGBA1C 10.1 (H) 07/03/2024      Continue current medications and f/u with Darrell Witt  On ACE and Statin according to guidelines  Follow ADA Diet. Avoid soda, simple sweets, and limit rice/pasta/breads/starches  Maintain healthy weight with goal BMI <30.  Exercise 5 times per week for 30 minutes per day  Stressed importance of daily foot exams  Educated on importance of annual dilated eye exam

## 2025-02-12 NOTE — PROGRESS NOTES
Patient ID: Michael Malone  : 1995    Chief Complaint: Depression    Allergies: Patient is allergic to codeine.     History of Present Illness:  The patient is a 29 y.o. Black or  male who presents to clinic for follow up on Depression     History of Present Illness    CHIEF COMPLAINT:  Patient presents today for follow up of mood concerns    DEPRESSION:  He reports no improvement in mood symptoms despite recent increase in Celexa dosage. He has been having arguments with his mother and brother and experiences daytime fatigue. He discontinued Abilify last month due to insurance issues but continues all other prescribed medications. Falls asleep without difficulty but wakes several times during the night. No SI/HI. NO longer participating in counseling.    DIABETES:  He reports generally stable blood sugar, with one isolated elevated reading of 200 mg/dL after dining at a restaurant.          Social History:  reports that he has never smoked. He has never used smokeless tobacco. He reports that he does not currently use alcohol. He reports that he does not use drugs.    Past Medical History:  has a past medical history of Anxiety, Autism, Diabetes mellitus, Diabetes mellitus, type 2, and Hyperlipidemia.    Current Medications:  Current Outpatient Medications   Medication Instructions    albuterol (PROVENTIL) 2.5 mg, Nebulization, Every 6 hours PRN    albuterol (VENTOLIN HFA) 90 mcg/actuation inhaler 2 puffs, Inhalation, Every 6 hours PRN, Rescue    ARIPiprazole (ABILIFY) 5 mg, Oral, Daily    atorvastatin (LIPITOR) 10 mg, Daily    citalopram (CELEXA) 20 mg, Oral, Daily    clonazePAM (KLONOPIN) 0.5 mg, Oral, Nightly PRN    donepeziL (ARICEPT) 10 mg, Oral, Daily    FARXIGA 10 mg, Oral, Daily    ibuprofen (ADVIL,MOTRIN) 800 mg, Oral, Every 6 hours PRN    icosapent ethyL (VASCEPA) 2 g, Oral, 2 times daily    lisinopriL (PRINIVIL,ZESTRIL) 20 mg, Daily    metFORMIN (GLUCOPHAGE) 1,000 mg, Oral,  "With breakfast    montelukast (SINGULAIR) 10 mg, Daily    pioglitazone (ACTOS) 30 mg, Daily    TOUJEO SOLOSTAR U-300 INSULIN 300 unit/mL (1.5 mL) InPn pen 1.5 mLs, Daily    TRESIBA FLEXTOUCH U-200 200 Units, Nightly       ROS: See HPI    Visit Vitals  /80 (BP Location: Right arm)   Pulse 108   Temp 97.2 °F (36.2 °C) (Temporal)   Ht 6' 1" (1.854 m)   Wt 126.1 kg (278 lb)   SpO2 97%   BMI 36.68 kg/m²       Physical Exam  Vitals reviewed.   Constitutional:       Appearance: Normal appearance. He is obese.   Cardiovascular:      Rate and Rhythm: Normal rate and regular rhythm.      Heart sounds: Normal heart sounds.   Pulmonary:      Effort: Pulmonary effort is normal.      Breath sounds: Normal breath sounds.   Skin:     General: Skin is warm and dry.   Neurological:      Mental Status: He is alert and oriented to person, place, and time. Mental status is at baseline.   Psychiatric:         Mood and Affect: Mood normal.         Judgment: Judgment normal.            Assessment & Plan:  1. Mild episode of recurrent major depressive disorder  Overview:  On citalopram    Assessment & Plan:  Continue citalopram   List of counselors given  Exercise daily. Get sunlight daily.  Practice positive phrases and repeat throughout the day, along with yoga and relaxation techniques.  Establish good social support, make changes to reduce stress.  Encourage counseling.   Reports any symptoms of suicidal/homicidal ideations or self harm immediately. If clinic is closed, go to nearest emergency room.       Orders:  -     citalopram (CELEXA) 20 MG tablet; Take 1 tablet (20 mg total) by mouth once daily.  Dispense: 30 tablet; Refill: 11    2. Type 2 diabetes mellitus with hyperglycemia, with long-term current use of insulin  Overview:  On Farxiga, metformin, saxagliptin, Trulicity, Toujeo per Endocrinology    Assessment & Plan:  Lab Results   Component Value Date    HGBA1C 11.7 (H) 12/04/2024    HGBA1C 10.1 (H) 07/03/2024    "   Continue current medications and f/u with Darrell Witt  On ACE and Statin according to guidelines  Follow ADA Diet. Avoid soda, simple sweets, and limit rice/pasta/breads/starches  Maintain healthy weight with goal BMI <30.  Exercise 5 times per week for 30 minutes per day  Stressed importance of daily foot exams  Educated on importance of annual dilated eye exam      Orders:  -     Microalbumin/Creatinine Ratio, Urine    3. Bipolar affective disorder, remission status unspecified  Assessment & Plan:  Resume aripiprazole 5 mg daily    Orders:  -     ARIPiprazole (ABILIFY) 5 MG Tab; Take 1 tablet (5 mg total) by mouth once daily.  Dispense: 30 tablet; Refill: 11    4. Mixed hyperlipidemia  Overview:  On Vascepa and atorvastatin    Assessment & Plan:  Lab Results   Component Value Date    CHOL 99 12/04/2024    HDL 25 (L) 12/04/2024    LDLDIRECT <30.0 (L) 12/04/2024    TRIG 425 (H) 12/04/2024     Resend generic for Vascepa to see if this is more affordable. Continue atorvastatin   LDL Goal: less than 70  Educated on dietary modifications. Follow a low cholesterol, low saturated fat.  Avoid fried foods and high saturated fats.  Increase dietary fiber.  Regular exercise can reduce LDL (bad cholesterol) and raise HDL (good cholesterol). Encourage physical activity 5 times per week for 30 minutes per day.      Orders:  -     Discontinue: VASCEPA 1 gram Cap; Take 2 capsules (2,000 mg total) by mouth 2 (two) times a day.  Dispense: 360 capsule; Refill: 3  -     icosapent ethyL (VASCEPA) 1 gram Cap; Take 2 capsules (2 g total) by mouth 2 (two) times daily.  Dispense: 360 capsule; Refill: 3         Future Appointments   Date Time Provider Department Center   3/11/2025  9:30 AM Maribell Witt FNP [facility]       Follow up in about 4 weeks (around 3/12/2025) for mood. Call sooner if needed.    STACEY Duran    Lab Frequency Next Occurrence   Vitamin D Once 07/24/2024   Ambulatory referral/consult to  Neurology Once 06/27/2024   Microalbumin/creatinine urine ratio Once 01/13/2025

## 2025-02-12 NOTE — ASSESSMENT & PLAN NOTE
Lab Results   Component Value Date    CHOL 99 12/04/2024    HDL 25 (L) 12/04/2024    LDLDIRECT <30.0 (L) 12/04/2024    TRIG 425 (H) 12/04/2024     Resend generic for Vascepa to see if this is more affordable. Continue atorvastatin   LDL Goal: less than 70  Educated on dietary modifications. Follow a low cholesterol, low saturated fat.  Avoid fried foods and high saturated fats.  Increase dietary fiber.  Regular exercise can reduce LDL (bad cholesterol) and raise HDL (good cholesterol). Encourage physical activity 5 times per week for 30 minutes per day.

## 2025-02-19 DIAGNOSIS — R10.11 RUQ PAIN: ICD-10-CM

## 2025-02-19 DIAGNOSIS — F41.9 ANXIETY: ICD-10-CM

## 2025-02-19 DIAGNOSIS — J01.90 ACUTE NON-RECURRENT SINUSITIS, UNSPECIFIED LOCATION: ICD-10-CM

## 2025-02-19 RX ORDER — CLONAZEPAM 0.5 MG/1
0.5 TABLET ORAL NIGHTLY PRN
Qty: 30 TABLET | Refills: 0 | OUTPATIENT
Start: 2025-02-19

## 2025-02-19 RX ORDER — AMOXICILLIN AND CLAVULANATE POTASSIUM 875; 125 MG/1; MG/1
1 TABLET, FILM COATED ORAL 2 TIMES DAILY
Qty: 20 TABLET | Refills: 0 | OUTPATIENT
Start: 2025-02-19 | End: 2025-03-01

## 2025-02-19 RX ORDER — IBUPROFEN 800 MG/1
800 TABLET ORAL EVERY 6 HOURS PRN
Qty: 30 TABLET | Refills: 0 | Status: SHIPPED | OUTPATIENT
Start: 2025-02-19

## 2025-03-11 ENCOUNTER — OFFICE VISIT (OUTPATIENT)
Dept: FAMILY MEDICINE | Facility: CLINIC | Age: 30
End: 2025-03-11
Payer: MEDICARE

## 2025-03-11 VITALS
HEART RATE: 76 BPM | BODY MASS INDEX: 37.91 KG/M2 | WEIGHT: 286 LBS | SYSTOLIC BLOOD PRESSURE: 128 MMHG | DIASTOLIC BLOOD PRESSURE: 80 MMHG | OXYGEN SATURATION: 98 % | TEMPERATURE: 98 F | HEIGHT: 73 IN

## 2025-03-11 DIAGNOSIS — Z79.4 TYPE 2 DIABETES MELLITUS WITH HYPERGLYCEMIA, WITH LONG-TERM CURRENT USE OF INSULIN: ICD-10-CM

## 2025-03-11 DIAGNOSIS — F33.0 MILD EPISODE OF RECURRENT MAJOR DEPRESSIVE DISORDER: ICD-10-CM

## 2025-03-11 DIAGNOSIS — F31.9 BIPOLAR AFFECTIVE DISORDER, REMISSION STATUS UNSPECIFIED: Primary | ICD-10-CM

## 2025-03-11 DIAGNOSIS — E11.65 TYPE 2 DIABETES MELLITUS WITH HYPERGLYCEMIA, WITH LONG-TERM CURRENT USE OF INSULIN: ICD-10-CM

## 2025-03-11 DIAGNOSIS — R10.11 RUQ PAIN: ICD-10-CM

## 2025-03-11 LAB — GLUCOSE SERPL-MCNC: 51 MG/DL (ref 70–110)

## 2025-03-11 PROCEDURE — 99214 OFFICE O/P EST MOD 30 MIN: CPT | Mod: ,,, | Performed by: NURSE PRACTITIONER

## 2025-03-11 PROCEDURE — 3074F SYST BP LT 130 MM HG: CPT | Mod: ,,, | Performed by: NURSE PRACTITIONER

## 2025-03-11 PROCEDURE — 3066F NEPHROPATHY DOC TX: CPT | Mod: ,,, | Performed by: NURSE PRACTITIONER

## 2025-03-11 PROCEDURE — 3008F BODY MASS INDEX DOCD: CPT | Mod: ,,, | Performed by: NURSE PRACTITIONER

## 2025-03-11 PROCEDURE — 3079F DIAST BP 80-89 MM HG: CPT | Mod: ,,, | Performed by: NURSE PRACTITIONER

## 2025-03-11 PROCEDURE — 82962 GLUCOSE BLOOD TEST: CPT | Mod: RHCUB | Performed by: NURSE PRACTITIONER

## 2025-03-11 PROCEDURE — 1160F RVW MEDS BY RX/DR IN RCRD: CPT | Mod: ,,, | Performed by: NURSE PRACTITIONER

## 2025-03-11 PROCEDURE — 3061F NEG MICROALBUMINURIA REV: CPT | Mod: ,,, | Performed by: NURSE PRACTITIONER

## 2025-03-11 PROCEDURE — 1159F MED LIST DOCD IN RCRD: CPT | Mod: ,,, | Performed by: NURSE PRACTITIONER

## 2025-03-11 RX ORDER — IBUPROFEN 800 MG/1
800 TABLET ORAL EVERY 6 HOURS PRN
Qty: 30 TABLET | Refills: 11 | Status: SHIPPED | OUTPATIENT
Start: 2025-03-11

## 2025-03-11 RX ORDER — DULAGLUTIDE 4.5 MG/.5ML
4.5 INJECTION, SOLUTION SUBCUTANEOUS
COMMUNITY

## 2025-03-11 RX ORDER — CARIPRAZINE 1.5 MG/1
1.5 CAPSULE, GELATIN COATED ORAL DAILY
Qty: 14 CAPSULE | Refills: 0 | COMMUNITY
Start: 2025-03-11

## 2025-03-11 NOTE — PROGRESS NOTES
Patient ID: Michael Malone  : 1995    Chief Complaint: Mood    Allergies: Patient is allergic to codeine.     History of Present Illness:  The patient is a 29 y.o. Black or  male who presents to clinic for follow up on Mood.  Last visit, aripiprazole was resumed for mood. He never started due to cost.  He continues to complain of sadness, excessive fatigue, and anhedonia.  He struggles to get off the couch in the mornings.  Mother also notes that he is often irritable in argues with family members.  Some issues falling asleep and staying asleep.  No SI/HI.    He has been eating less lately. He injects Toujeo 60 units daily with Farxiga 10 mg daily, metformin a 1000 mg daily, and pioglitazone 30 mg daily. He has not been taking trulicity weekly due to cost. Blood sugar in office was 51 and he denies any previous lows.     Social History:  reports that he has never smoked. He has never used smokeless tobacco. He reports that he does not currently use alcohol. He reports that he does not use drugs.    Past Medical History:  has a past medical history of Anxiety, Autism, Diabetes mellitus, Diabetes mellitus, type 2, and Hyperlipidemia.    Current Medications:  Current Outpatient Medications   Medication Instructions    albuterol (PROVENTIL) 2.5 mg, Nebulization, Every 6 hours PRN    albuterol (VENTOLIN HFA) 90 mcg/actuation inhaler 2 puffs, Inhalation, Every 6 hours PRN, Rescue    atorvastatin (LIPITOR) 10 mg, Daily    citalopram (CELEXA) 20 mg, Oral, Daily    clonazePAM (KLONOPIN) 0.5 mg, Oral, Nightly PRN    donepeziL (ARICEPT) 10 mg, Oral, Daily    FARXIGA 10 mg, Oral, Daily    ibuprofen (ADVIL,MOTRIN) 800 mg, Oral, Every 6 hours PRN    icosapent ethyL (VASCEPA) 2 g, Oral, 2 times daily    lisinopriL (PRINIVIL,ZESTRIL) 20 mg, Daily    metFORMIN (GLUCOPHAGE) 1,000 mg, Oral, With breakfast    montelukast (SINGULAIR) 10 mg, Daily    pioglitazone (ACTOS) 30 mg, Daily    TOUJEO SOLOSTAR U-300  "INSULIN 300 unit/mL (1.5 mL) InPn pen 1.5 mLs, Daily    TRULICITY 4.5 mg, Every 7 days    VRAYLAR 1.5 mg, Oral, Daily       ROS: See HPI    Visit Vitals  /80 (Patient Position: Sitting)   Pulse 76   Temp 97.6 °F (36.4 °C)   Ht 6' 1" (1.854 m)   Wt 129.7 kg (286 lb)   SpO2 98%   BMI 37.73 kg/m²       Physical Exam  Vitals reviewed.   Constitutional:       Appearance: Normal appearance. He is obese.   Cardiovascular:      Rate and Rhythm: Normal rate and regular rhythm.      Heart sounds: Normal heart sounds.   Pulmonary:      Effort: Pulmonary effort is normal.      Breath sounds: Normal breath sounds.   Skin:     General: Skin is warm and dry.   Neurological:      Mental Status: He is alert and oriented to person, place, and time. Mental status is at baseline.   Psychiatric:         Mood and Affect: Mood normal.            Assessment & Plan:  1. Bipolar affective disorder, remission status unspecified  Assessment & Plan:  Unable to afford aripiprazole   Samples of Vraylar 1.5 mg daily given  New patient appointment with psych NP      2. Mild episode of recurrent major depressive disorder  Overview:  On citalopram    Assessment & Plan:  Continue citalopram   Exercise daily. Get sunlight daily.  Practice positive phrases and repeat throughout the day, along with yoga and relaxation techniques.  Establish good social support, make changes to reduce stress.  Encourage counseling.   Reports any symptoms of suicidal/homicidal ideations or self harm immediately. If clinic is closed, go to nearest emergency room.       Orders:  -     cariprazine (VRAYLAR) 1.5 mg Cap; Take 1 capsule (1.5 mg total) by mouth once daily.  Dispense: 14 capsule; Refill: 0    3. Type 2 diabetes mellitus with hyperglycemia, with long-term current use of insulin  Overview:  On Farxiga, metformin, saxagliptin, Trulicity, Toujeo per Endocrinology    Assessment & Plan:  Followed by Darrell Witt with upcoming appointment later this " month  Episode of hypoglycemia in office.  Resolved after piece of candy and protein bar given  Recommended CGM sensor but patient very adamantly declines   Continue current medications and ADA diet   Discussed symptoms of hypoglycemia and appropriate treatment     Orders:  -     POCT Glucose, Hand-Held Device         Future Appointments   Date Time Provider Department Center   3/25/2025  9:30 AM Maribell Witt FNP [facility]       Follow up in about 2 weeks (around 3/25/2025) for mood. Call sooner if needed.    STACEY Duran    Lab Frequency Next Occurrence   Vitamin D Once 07/24/2024   Ambulatory referral/consult to Neurology Once 06/27/2024

## 2025-03-11 NOTE — ASSESSMENT & PLAN NOTE
Unable to afford aripiprazole   Samples of Vraylar 1.5 mg daily given  New patient appointment with psych NP

## 2025-03-11 NOTE — ASSESSMENT & PLAN NOTE
Continue citalopram   Exercise daily. Get sunlight daily.  Practice positive phrases and repeat throughout the day, along with yoga and relaxation techniques.  Establish good social support, make changes to reduce stress.  Encourage counseling.   Reports any symptoms of suicidal/homicidal ideations or self harm immediately. If clinic is closed, go to nearest emergency room.

## 2025-03-11 NOTE — ASSESSMENT & PLAN NOTE
Followed by Darrell Witt with upcoming appointment later this month  Episode of hypoglycemia in office.  Resolved after piece of candy and protein bar given  Recommended CGM sensor but patient very adamantly declines   Continue current medications and ADA diet   Discussed symptoms of hypoglycemia and appropriate treatment

## 2025-04-10 ENCOUNTER — HOSPITAL ENCOUNTER (EMERGENCY)
Facility: HOSPITAL | Age: 30
Discharge: HOME | End: 2025-04-10
Attending: FAMILY MEDICINE
Payer: MEDICARE

## 2025-04-10 VITALS
BODY MASS INDEX: 35.78 KG/M2 | HEART RATE: 84 BPM | TEMPERATURE: 99 F | OXYGEN SATURATION: 98 % | HEIGHT: 73 IN | SYSTOLIC BLOOD PRESSURE: 152 MMHG | WEIGHT: 270 LBS | DIASTOLIC BLOOD PRESSURE: 97 MMHG | RESPIRATION RATE: 16 BRPM

## 2025-04-10 DIAGNOSIS — L08.9 SOFT TISSUE INFECTION: Primary | ICD-10-CM

## 2025-04-10 PROCEDURE — 99284 EMERGENCY DEPT VISIT MOD MDM: CPT

## 2025-04-10 RX ORDER — SULFAMETHOXAZOLE AND TRIMETHOPRIM 800; 160 MG/1; MG/1
1 TABLET ORAL 2 TIMES DAILY
Qty: 14 TABLET | Refills: 0 | Status: SHIPPED | OUTPATIENT
Start: 2025-04-10 | End: 2025-04-17

## 2025-04-10 RX ORDER — MUPIROCIN 20 MG/G
OINTMENT TOPICAL 3 TIMES DAILY
Qty: 15 G | Refills: 0 | Status: SHIPPED | OUTPATIENT
Start: 2025-04-10

## 2025-04-10 NOTE — ED PROVIDER NOTES
"Encounter Date: 4/10/2025       History     Chief Complaint   Patient presents with    Facial Pain     Pt presents with nose pain, blood when wiping nose, and "feels like snot is building up and wont come out" x2 weeks. States he has been using nasal spray with no relief.     Patient presents with a three-week three-week history of left Frederick lesion that occasionally bleeds in his painful.  No fevers chills sweats.  No sternal swelling or erythema.  The patient has a history of diabetes.  He has not seen his PCP during this time        Review of patient's allergies indicates:   Allergen Reactions    Codeine      Past Medical History:   Diagnosis Date    Anxiety     Autism     Diabetes mellitus     Diabetes mellitus, type 2     Hyperlipidemia      Past Surgical History:   Procedure Laterality Date    ADENOIDECTOMY      TONSILLECTOMY      WRIST SURGERY       Family History   Problem Relation Name Age of Onset    Hypertension Mother      Hyperlipidemia Mother      Breast cancer Mother      Anxiety disorder Mother      Depression Mother      Hypertension Father      Kidney failure Father      Heart failure Father      Diabetes type II Father      Rheum arthritis Father      Hyperlipidemia Father       Social History[1]  Review of Systems   Constitutional: Negative.    HENT:          Nasal lesion   Eyes: Negative.    Respiratory: Negative.     Musculoskeletal: Negative.        Physical Exam     Initial Vitals [04/10/25 0927]   BP Pulse Resp Temp SpO2   (!) 152/97 84 16 98.5 °F (36.9 °C) 98 %      MAP       --         Physical Exam    Constitutional: He appears well-developed and well-nourished.   Neck:   Left Frederick medial aspect with a superficial raised lesion with dried blood/scabbing noted.  No external cellulitis erythema induration or fluctuance   Cardiovascular:  Normal rate, regular rhythm and normal heart sounds.           Pulmonary/Chest: Breath sounds normal.     Neurological: He is alert and oriented to person, " place, and time.   Skin: Skin is warm and dry.         ED Course   Procedures  Labs Reviewed - No data to display       Imaging Results    None          Medications - No data to display  Medical Decision Making  Risk  Prescription drug management.      Additional MDM:   Differential Diagnosis:   Abrasion abscess cellulitis                                    Clinical Impression:  Final diagnoses:  [L08.9] Soft tissue infection (Primary)          ED Disposition Condition    Discharge Stable          ED Prescriptions       Medication Sig Dispense Start Date End Date Auth. Provider    sulfamethoxazole-trimethoprim 800-160mg (BACTRIM DS) 800-160 mg Tab Take 1 tablet by mouth 2 (two) times daily. for 7 days 14 tablet 4/10/2025 4/17/2025 Ash Metzger MD    mupirocin (BACTROBAN) 2 % ointment Apply topically 3 (three) times daily. 15 g 4/10/2025 -- Ash Metzger MD          Follow-up Information       Follow up With Specialties Details Why Contact Info    Maribell Witt FNP Family Medicine In 3 days  [address] [facility] [address]  [place] [address]  [phone]                 [1]   Social History  Tobacco Use    Smoking status: Never    Smokeless tobacco: Never   Substance Use Topics    Alcohol use: Not Currently     Comment: occ    Drug use: Never        Ash Metzger MD  04/10/25 0941

## 2025-04-23 ENCOUNTER — OFFICE VISIT (OUTPATIENT)
Dept: FAMILY MEDICINE | Facility: CLINIC | Age: 30
End: 2025-04-23
Payer: MEDICARE

## 2025-04-23 VITALS
DIASTOLIC BLOOD PRESSURE: 88 MMHG | BODY MASS INDEX: 36.45 KG/M2 | HEART RATE: 88 BPM | OXYGEN SATURATION: 97 % | SYSTOLIC BLOOD PRESSURE: 124 MMHG | TEMPERATURE: 98 F | WEIGHT: 275 LBS | HEIGHT: 73 IN

## 2025-04-23 DIAGNOSIS — F33.0 MILD EPISODE OF RECURRENT MAJOR DEPRESSIVE DISORDER: ICD-10-CM

## 2025-04-23 DIAGNOSIS — L01.00 IMPETIGO: ICD-10-CM

## 2025-04-23 DIAGNOSIS — F31.9 BIPOLAR AFFECTIVE DISORDER, REMISSION STATUS UNSPECIFIED: Primary | ICD-10-CM

## 2025-04-23 DIAGNOSIS — F41.9 ANXIETY: ICD-10-CM

## 2025-04-23 DIAGNOSIS — I10 HYPERTENSION, UNSPECIFIED TYPE: ICD-10-CM

## 2025-04-23 RX ORDER — ARIPIPRAZOLE 5 MG/1
5 TABLET ORAL
COMMUNITY
Start: 2025-03-11

## 2025-04-23 RX ORDER — INSULIN DEGLUDEC 200 U/ML
INJECTION, SOLUTION SUBCUTANEOUS
COMMUNITY
Start: 2025-04-07

## 2025-04-23 RX ORDER — OMEGA-3-ACID ETHYL ESTERS 1 G/1
2 CAPSULE, LIQUID FILLED ORAL 2 TIMES DAILY
COMMUNITY
Start: 2025-03-21

## 2025-04-23 RX ORDER — LISINOPRIL 20 MG/1
20 TABLET ORAL DAILY
Qty: 90 TABLET | Refills: 3 | Status: SHIPPED | OUTPATIENT
Start: 2025-04-23 | End: 2026-04-23

## 2025-04-23 NOTE — ASSESSMENT & PLAN NOTE
Practice deep breathing when anxiety occurs.  Exercise daily. Get sunlight daily.  Avoid caffeine, alcohol and stimulants.  Practice positive phrases and repeat throughout the day, along with yoga and relaxation techniques.  Set healthy boundaries, avoid people and conversations that increase stress.   Report any suicidal or homicidal ideations immediately. If clinic is closed, go to nearest emergency room.

## 2025-04-23 NOTE — PROGRESS NOTES
Patient ID: Michael Malone  : 1995    Chief Complaint: Anxiety    Allergies: Patient is allergic to codeine.     History of Present Illness:  The patient is a 29 y.o. Black or  male who presents to clinic for follow up on Anxiety.  At last visit, samples of Vraylar 1.5 mg daily were given. He took all of the medication but experienced at least 3 episodes of diarrhea daily so he stopped it. He was finally able to get the aripiprazole from pharmacy. He finds a mild improvement in mood. He is no longer feeling sad and has more energy. He continues to feel irritable and gets agitated easily. Resting well. No SI/HI.     Recent ED visit for soft tissue infection of the left nares.  He completed Bactrim and swelling and redness have resolved.    His A1c decreased to 9 from 12. Diabetes is managed per Darrell Witt.     Social History:  reports that he has never smoked. He has never used smokeless tobacco. He reports that he does not currently use alcohol. He reports that he does not use drugs.    Past Medical History:  has a past medical history of Anxiety, Autism, Diabetes mellitus, Diabetes mellitus, type 2, and Hyperlipidemia.    Current Medications:  Current Outpatient Medications   Medication Instructions    albuterol (PROVENTIL) 2.5 mg, Nebulization, Every 6 hours PRN    albuterol (VENTOLIN HFA) 90 mcg/actuation inhaler 2 puffs, Inhalation, Every 6 hours PRN, Rescue    ARIPiprazole (ABILIFY) 5 mg    atorvastatin (LIPITOR) 10 mg, Daily    citalopram (CELEXA) 20 mg, Oral, Daily    clonazePAM (KLONOPIN) 0.5 mg, Oral, Nightly PRN    donepeziL (ARICEPT) 10 mg, Oral, Daily    FARXIGA 10 mg, Oral, Daily    ibuprofen (ADVIL,MOTRIN) 800 mg, Oral, Every 6 hours PRN    icosapent ethyL (VASCEPA) 2 g, Oral, 2 times daily    lisinopriL (PRINIVIL,ZESTRIL) 20 mg, Oral, Daily    metFORMIN (GLUCOPHAGE) 1,000 mg, Oral, With breakfast    montelukast (SINGULAIR) 10 mg, Daily    mupirocin (BACTROBAN) 2 %  "ointment Topical (Top), 3 times daily    omega-3 acid ethyl esters (LOVAZA) 1 gram capsule 2 capsules, 2 times daily    pioglitazone (ACTOS) 30 mg, Daily    TOUJEO SOLOSTAR U-300 INSULIN 300 unit/mL (1.5 mL) InPn pen 1.5 mLs, Daily    TRESIBA FLEXTOUCH U-200 200 unit/mL (3 mL) insulin pen SMARTSI Unit(s) SUB-Q Daily    TRULICITY 4.5 mg, Every 7 days    VRAYLAR 1.5 mg, Oral, Daily       ROS: See HPI    Visit Vitals  /88 (BP Location: Right arm, Patient Position: Sitting)   Pulse 88   Temp 98.4 °F (36.9 °C) (Temporal)   Ht 6' 0.99" (1.854 m)   Wt 124.7 kg (275 lb)   SpO2 97%   BMI 36.29 kg/m²       Physical Exam  Vitals reviewed.   Constitutional:       Appearance: Normal appearance. He is obese.   HENT:      Nose:      Comments: Mild erythematous crusting to left nares  Cardiovascular:      Rate and Rhythm: Normal rate and regular rhythm.      Heart sounds: Normal heart sounds.   Pulmonary:      Effort: Pulmonary effort is normal.      Breath sounds: Normal breath sounds.   Skin:     General: Skin is warm and dry.   Neurological:      Mental Status: He is alert and oriented to person, place, and time. Mental status is at baseline.   Psychiatric:         Mood and Affect: Mood normal.            Assessment & Plan:  1. Bipolar affective disorder, remission status unspecified  Overview:  Vraylar caused diarrhea    Assessment & Plan:  Increase aripiprazole to 10 mg nightly. Patient will take 2 of the 5 mg tablets until next visit      2. Mild episode of recurrent major depressive disorder  Overview:  On citalopram    Assessment & Plan:  Continue current medication      3. Anxiety  Assessment & Plan:  Practice deep breathing when anxiety occurs.  Exercise daily. Get sunlight daily.  Avoid caffeine, alcohol and stimulants.  Practice positive phrases and repeat throughout the day, along with yoga and relaxation techniques.  Set healthy boundaries, avoid people and conversations that increase stress.   Report any " suicidal or homicidal ideations immediately. If clinic is closed, go to nearest emergency room.       4. Hypertension, unspecified type  Overview:  On lisinopril    Assessment & Plan:  Well controlled today  Continue current medication    Orders:  -     lisinopriL (PRINIVIL,ZESTRIL) 20 MG tablet; Take 1 tablet (20 mg total) by mouth once daily.  Dispense: 90 tablet; Refill: 3         Future Appointments   Date Time Provider Department Center   5/21/2025  9:00 AM Maribell Witt FNP [facility]       Follow up in about 4 weeks (around 5/21/2025) for mood. Call sooner if needed.    STACEY Duran    Lab Frequency Next Occurrence   Vitamin D Once 07/24/2024

## 2025-06-04 ENCOUNTER — OFFICE VISIT (OUTPATIENT)
Dept: FAMILY MEDICINE | Facility: CLINIC | Age: 30
End: 2025-06-04
Payer: MEDICARE

## 2025-06-04 VITALS
HEIGHT: 73 IN | WEIGHT: 273.63 LBS | HEART RATE: 85 BPM | DIASTOLIC BLOOD PRESSURE: 62 MMHG | BODY MASS INDEX: 36.27 KG/M2 | OXYGEN SATURATION: 98 % | SYSTOLIC BLOOD PRESSURE: 114 MMHG | TEMPERATURE: 96 F

## 2025-06-04 DIAGNOSIS — I10 HYPERTENSION, UNSPECIFIED TYPE: ICD-10-CM

## 2025-06-04 DIAGNOSIS — E78.2 MIXED HYPERLIPIDEMIA: ICD-10-CM

## 2025-06-04 DIAGNOSIS — F31.9 BIPOLAR AFFECTIVE DISORDER, REMISSION STATUS UNSPECIFIED: Primary | ICD-10-CM

## 2025-06-04 DIAGNOSIS — E11.65 TYPE 2 DIABETES MELLITUS WITH HYPERGLYCEMIA, WITH LONG-TERM CURRENT USE OF INSULIN: ICD-10-CM

## 2025-06-04 DIAGNOSIS — F41.9 ANXIETY: ICD-10-CM

## 2025-06-04 DIAGNOSIS — Z79.4 TYPE 2 DIABETES MELLITUS WITH HYPERGLYCEMIA, WITH LONG-TERM CURRENT USE OF INSULIN: ICD-10-CM

## 2025-06-04 DIAGNOSIS — G47.00 INSOMNIA, UNSPECIFIED TYPE: ICD-10-CM

## 2025-06-04 RX ORDER — CLONAZEPAM 0.5 MG/1
0.5 TABLET ORAL NIGHTLY PRN
Qty: 30 TABLET | Refills: 1 | Status: SHIPPED | OUTPATIENT
Start: 2025-06-04

## 2025-06-04 RX ORDER — ARIPIPRAZOLE 10 MG/1
10 TABLET ORAL DAILY
Qty: 30 TABLET | Refills: 0 | Status: SHIPPED | OUTPATIENT
Start: 2025-06-04 | End: 2025-07-04

## 2025-06-17 ENCOUNTER — OFFICE VISIT (OUTPATIENT)
Dept: BEHAVIORAL HEALTH | Facility: CLINIC | Age: 30
End: 2025-06-17
Payer: MEDICARE

## 2025-06-17 VITALS
WEIGHT: 275.38 LBS | BODY MASS INDEX: 36.5 KG/M2 | HEIGHT: 73 IN | OXYGEN SATURATION: 97 % | HEART RATE: 91 BPM | SYSTOLIC BLOOD PRESSURE: 122 MMHG | TEMPERATURE: 98 F | DIASTOLIC BLOOD PRESSURE: 76 MMHG

## 2025-06-17 DIAGNOSIS — F84.0 AUTISM: ICD-10-CM

## 2025-06-17 DIAGNOSIS — F41.9 ANXIETY: ICD-10-CM

## 2025-06-17 DIAGNOSIS — F43.21 GRIEF: ICD-10-CM

## 2025-06-17 DIAGNOSIS — F33.0 MILD EPISODE OF RECURRENT MAJOR DEPRESSIVE DISORDER: Primary | ICD-10-CM

## 2025-06-17 DIAGNOSIS — G47.00 INSOMNIA, UNSPECIFIED TYPE: ICD-10-CM

## 2025-06-17 PROCEDURE — 3078F DIAST BP <80 MM HG: CPT | Mod: ,,, | Performed by: NURSE PRACTITIONER

## 2025-06-17 PROCEDURE — 1159F MED LIST DOCD IN RCRD: CPT | Mod: ,,, | Performed by: NURSE PRACTITIONER

## 2025-06-17 PROCEDURE — 3074F SYST BP LT 130 MM HG: CPT | Mod: ,,, | Performed by: NURSE PRACTITIONER

## 2025-06-17 PROCEDURE — 99204 OFFICE O/P NEW MOD 45 MIN: CPT | Mod: ,,, | Performed by: NURSE PRACTITIONER

## 2025-06-17 PROCEDURE — 3061F NEG MICROALBUMINURIA REV: CPT | Mod: ,,, | Performed by: NURSE PRACTITIONER

## 2025-06-17 PROCEDURE — 4010F ACE/ARB THERAPY RXD/TAKEN: CPT | Mod: ,,, | Performed by: NURSE PRACTITIONER

## 2025-06-17 PROCEDURE — 3066F NEPHROPATHY DOC TX: CPT | Mod: ,,, | Performed by: NURSE PRACTITIONER

## 2025-06-17 PROCEDURE — 1160F RVW MEDS BY RX/DR IN RCRD: CPT | Mod: ,,, | Performed by: NURSE PRACTITIONER

## 2025-06-17 PROCEDURE — 3008F BODY MASS INDEX DOCD: CPT | Mod: ,,, | Performed by: NURSE PRACTITIONER

## 2025-06-17 RX ORDER — CITALOPRAM 20 MG/1
20 TABLET ORAL DAILY
Qty: 30 TABLET | Refills: 1 | Status: SHIPPED | OUTPATIENT
Start: 2025-06-17 | End: 2026-06-17

## 2025-06-17 RX ORDER — ARIPIPRAZOLE 10 MG/1
10 TABLET ORAL DAILY
Qty: 30 TABLET | Refills: 1 | Status: SHIPPED | OUTPATIENT
Start: 2025-06-17 | End: 2025-07-17

## 2025-06-17 RX ORDER — DONEPEZIL HYDROCHLORIDE 10 MG/1
10 TABLET, FILM COATED ORAL DAILY
Qty: 30 TABLET | Refills: 1 | Status: SHIPPED | OUTPATIENT
Start: 2025-06-17

## 2025-06-17 NOTE — PROGRESS NOTES
PSYCHIATRIC INITIAL VISIT NOTE    Chief Complaint   Patient presents with    [facility]         History of Present Illness  29 y.o. year old Black or  male with hx of autism, mood disorder and anxiety seen today for initial psychiatric evaluation and medication management.  He is accompanied to today's visit by his mother, Breanna.  They report patient has been doing quite well until approximately 10 days ago when his brother passed away suddenly after an electrical accident at work.  Since then he has been experiencing some profound grief.  Also with anhedonia, depressed mood, sleep disturbances, fatigue, feelings of guilt, poor focus, psychomotor retardation, excess worry, general worry, difficulty relaxing, restlessness, and catastrophizing.  They report that prior to his brother's death patient was doing quite well on current medication regimen.  About a week before he has passed, they did run out of his citalopram, and did not contact anyone for a refill.  He adamantly denies any thoughts of harming self or others.  Has been more isolative and to himself.  Attending to his hygiene in his not missed any shifts at work.  He does plan to move into his own place and begin living more independently soon.  He does have a good support system who will be assisting him in this effort.  He denies any history of nightmares or flashbacks.  No history of psychiatric admissions.  Denies any history of suicide attempts or nonsuicidal self-harm behaviors.  No history of SI/HI.  Denies any history of hallucinations.  Upon reviewing the criteria for a manic episode, patient and his mother confirmed that he has never experienced anything similar to those criteria. Patient denies SI/HI. Denies hallucinations and does not appear to be responding to internal stimuli or be internally preoccupied. No manic symptoms noted. Tolerating SGA therapy without serious side effects. No NMS s/s. No EPS or TD  symptoms noted. AIMS=0.    Patient was raised by his biological parents.  Has 6 siblings.  He did experience developmental delays in childhood but was able to graduate high school after completing a special education program.  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  Has been employed for just over 2 years.  No history of seizure activity or head injuries.  He lives with his mother and feels safe at home and that he has a good support system.  They plan on moving him to a more independent housing situation in the near future.  His hobbies include watching TV and playing video games.  He has never been  and has no children.  Does identify as Moravian.  Psychiatric family history includes mother with depression.  Fairly poor historian when concerning past medications.  They do report he has not needed any Klonopin this year and that he has been sleeping quite well without it.  No reported drug, tobacco, or alcohol abuse.  Patient did use alcohol in the past but not lately        Medical History    Past Medical History    Diagnosis Date Comments   Diabetes mellitus [E11.9]     Anxiety [F41.9]     Diabetes mellitus, type 2 [E11.9]     Autism [F84.0]     Hyperlipidemia [E78.5]       Surgical History    Past Surgical History     Laterality Date Comments   Wrist surgery [FCL450]      Adenoidectomy [SUR15]      Tonsillectomy [MAL5136]         Family History     Relation Status Problems (Age of Onset) - (Comment)        Mother  Hypertension; Hyperlipidemia; Breast cancer; Anxiety disorder; Depression   Father  Hypertension; Kidney failure; Heart failure; Diabetes type II; Rheum arthritis; Hyperlipidemia     Social History    Tobacco History    Smoking Status  Never   Smokeless Tobacco Use  Never   Alcohol History    Alcohol Use Status  Not Currently Drinks/Week  21 Glasses of wine per week Amount  21.0 standard drinks of alcohol/wk Comment  occ   Drug Use    Drug Use Status  Never  "  Sexual Activity    Sexually Active  Not Currently     Additional Pertinent History    Questions Responses   Lives with family   Place in Birth Order other   Lives in home   Number of Siblings other   Raised by biological parents   Legal Involvement none   Childhood Trauma early trauma   Criminal History of none   Financial Status employed   Highest Level of Education high school graduation   Does patient have access to a firearm? No    Service No   Primary Leisure Activity other   Spirituality actively participates in organized Mosque     Current Gender Identity    Questions Responses   Patient's Gender Identity: Male   Patient's sex assigned at birth: Male         Objective:     Vitals:  Vitals:    06/17/25 0810   BP: 122/76   BP Location: Right arm   Patient Position: Sitting   Pulse: 91   Temp: 97.7 °F (36.5 °C)   TempSrc: Temporal   SpO2: 97%   Weight: 124.9 kg (275 lb 5.7 oz)   Height: 6' 0.99" (1.854 m)       Wt Readings from Last 3 Encounters:   06/17/25 0810 124.9 kg (275 lb 5.7 oz)   06/04/25 1348 124.1 kg (273 lb 9.6 oz)   04/23/25 0913 124.7 kg (275 lb)         Medication:  Current Medications[1]       Significant Labs: - none at this time    Significant Imaging: - none at this time    Physical Exam     See HPI    Review of Systems     Mental Status Exam:  Presentation:  - Appearance: 29 y.o. year old Black or  male, appears stated age, appears Casually dressed and Well groomed  - Motility: Erect when standing, Steady gait, No EPS or Tremors, No psychomotor agitation or retardation appreciated  - Behavior: calm, cooperative, doesn't maintain eye contact  Speech:  - Character/Organization: spontaneous, fluent, normal volume, normal rate, normal rhythm  Emotional State:  - Mood: "sad"   - Affect: congruent and depressed  Thought:  - Process: logical, linear, organized , goal-directed  - Preoccupations: guilt, family  - Delusions: no persecutory, paranoid, or grandiose delusions " appreciated  - Perception: denies AVH, not actively responding to internal stimuli  - SI/HI: denies/denies  Sensorium & Intellect:  - Sensorium: AAOx4  - Memory: intact to recent and remote events  - Attention/Concentration: good/good  - Insight/Judgement: limited, good to fair    Gait: normal swing and stance  MSK:no rigidity appreciated    All other systems without acute issues unless noted in HPI      Assessment/Plan      ICD-10-CM ICD-9-CM    1. Mild episode of recurrent major depressive disorder  F33.0 296.31 ARIPiprazole (ABILIFY) 10 MG Tab      citalopram (CELEXA) 20 MG tablet      2. Anxiety  F41.9 300.00 Ambulatory referral/consult to Psychiatry      3. Autism  F84.0 299.00 donepeziL (ARICEPT) 10 MG tablet      4. Insomnia, unspecified type  G47.00 780.52 Ambulatory referral/consult to Psychiatry      5. Grief  F43.21 309.0          Resume citalopram 20 mg daily    Continue other medications without change    Encouraged patient to begin grief counseling with his  or [facility]    Potential side effects and risks vs benefits of current treatment plan reviewed with patient. Applicable black box warnings reviewed. Encouraged patient not to alter dosages or abruptly discontinue medications without contacting prescriber first, due to risk of worsening symptoms and decompensation of mental status. Warned of risks associated with herbal remedies and supplements while taking psychotropic medications and of the need to consult prescriber prior to adding any of these to current regimen. Patient should abstain from abuse of alcohol, prescription medications, and illicit drugs. Reviewed when to contact clinic and/or seek emergent care, such as but not limited to, onset/worsening SI/HI, hallucinations, delusions, manic symptoms. Pt verbalized understanding and agreement of these warnings/recommendations and verbally consented to treatment plan.     checked. Results as expected. No suspected diversion or  abuse of controlled substances.      Follow up in about 6 weeks (around 2025) for Medication Management.        MELLO Doty         [1]   Current Outpatient Medications:     albuterol (PROVENTIL) 2.5 mg /3 mL (0.083 %) nebulizer solution, TAKE 3 MLS BY NEBULIZATION EVERY 6 (SIX) HOURS AS NEEDED FOR WHEEZING. RESCUE, Disp: 300 mL, Rfl: 11    albuterol (VENTOLIN HFA) 90 mcg/actuation inhaler, Inhale 2 puffs into the lungs every 6 (six) hours as needed for Wheezing or Shortness of Breath. Rescue, Disp: 18 g, Rfl: 11    atorvastatin (LIPITOR) 10 MG tablet, Take 10 mg by mouth once daily., Disp: , Rfl:     dulaglutide (TRULICITY) 4.5 mg/0.5 mL pen injector, Inject 4.5 mg into the skin every 7 days., Disp: , Rfl:     FARXIGA 10 mg tablet, Take 1 tablet (10 mg total) by mouth Daily., Disp: 90 tablet, Rfl: 3    ibuprofen (ADVIL,MOTRIN) 800 MG tablet, TAKE 1 TABLET BY MOUTH EVERY 6 HOURS AS NEEDED FOR PAIN, Disp: 30 tablet, Rfl: 11    icosapent ethyL (VASCEPA) 1 gram Cap, Take 2 capsules (2 g total) by mouth 2 (two) times daily., Disp: 360 capsule, Rfl: 3    lisinopriL (PRINIVIL,ZESTRIL) 20 MG tablet, Take 1 tablet (20 mg total) by mouth once daily., Disp: 90 tablet, Rfl: 3    metFORMIN (GLUCOPHAGE) 1000 MG tablet, Take 1 tablet (1,000 mg total) by mouth daily with breakfast., Disp: 90 tablet, Rfl: 3    montelukast (SINGULAIR) 10 mg tablet, Take 10 mg by mouth once daily., Disp: , Rfl:     pioglitazone (ACTOS) 30 MG tablet, Take 30 mg by mouth once daily., Disp: , Rfl:     TRESIBA FLEXTOUCH U-200 200 unit/mL (3 mL) insulin pen, SMARTSI Unit(s) SUB-Q Daily, Disp: , Rfl:     ARIPiprazole (ABILIFY) 10 MG Tab, Take 1 tablet (10 mg total) by mouth once daily., Disp: 30 tablet, Rfl: 1    citalopram (CELEXA) 20 MG tablet, Take 1 tablet (20 mg total) by mouth once daily., Disp: 30 tablet, Rfl: 1    donepeziL (ARICEPT) 10 MG tablet, Take 1 tablet (10 mg total) by mouth once daily., Disp: 30 tablet, Rfl:  1

## 2025-06-25 ENCOUNTER — TELEPHONE (OUTPATIENT)
Dept: BEHAVIORAL HEALTH | Facility: CLINIC | Age: 30
End: 2025-06-25
Payer: MEDICARE

## 2025-06-25 ENCOUNTER — PATIENT MESSAGE (OUTPATIENT)
Dept: BEHAVIORAL HEALTH | Facility: CLINIC | Age: 30
End: 2025-06-25
Payer: MEDICARE

## 2025-06-25 DIAGNOSIS — G47.00 INSOMNIA, UNSPECIFIED TYPE: Primary | ICD-10-CM

## 2025-06-25 RX ORDER — TRAZODONE HYDROCHLORIDE 150 MG/1
75 TABLET ORAL NIGHTLY
COMMUNITY
End: 2025-06-25 | Stop reason: SDUPTHER

## 2025-06-25 RX ORDER — TRAZODONE HYDROCHLORIDE 150 MG/1
75 TABLET ORAL NIGHTLY
Qty: 30 TABLET | Refills: 1 | Status: SHIPPED | OUTPATIENT
Start: 2025-06-25

## 2025-06-25 NOTE — TELEPHONE ENCOUNTER
----- Message from MELLO Doty sent at 6/25/2025  1:34 PM CDT -----  Regarding: RE: Trazodone  Okay to send 30-day supply with 1 refill. That will be enough to last until his next follow-up. Thanks!  ----- Message -----  From: Elvia Bradford LPN  Sent: 6/25/2025   1:31 PM CDT  To: MELLO Del Valle  Subject: FW: Trazodone                                    He gets Trazodone 150 mg and takes half of a tablet daily.  ----- Message -----  From: Juwan Malone PMHNP  Sent: 6/25/2025   6:48 AM CDT  To: Elvia Bradford LPN  Subject: RE: Trazodone                                    Can we confirm which dose he has been taking?  ----- Message -----  From: Elvia Bradford LPN  Sent: 6/24/2025  10:13 AM CDT  To: MELLO Del Valle  Subject: Trazodone                                         Maribell sent me a message that he needed his Trazodone filled ,but I don't see an active order for it. Please advise.

## 2025-07-04 DIAGNOSIS — F84.0 AUTISM: ICD-10-CM

## 2025-07-07 RX ORDER — DONEPEZIL HYDROCHLORIDE 10 MG/1
10 TABLET, FILM COATED ORAL
Qty: 90 TABLET | Refills: 3 | Status: SHIPPED | OUTPATIENT
Start: 2025-07-07

## 2025-07-29 ENCOUNTER — TELEPHONE (OUTPATIENT)
Dept: FAMILY MEDICINE | Facility: CLINIC | Age: 30
End: 2025-07-29
Payer: MEDICARE

## 2025-07-29 NOTE — TELEPHONE ENCOUNTER
Called and spoke with patient and let him know that it is medication management appointment. Also explained that he had to see Slava so he can see how he is doing on the medication. Patient verbalized understanding.

## 2025-07-30 ENCOUNTER — OFFICE VISIT (OUTPATIENT)
Dept: BEHAVIORAL HEALTH | Facility: CLINIC | Age: 30
End: 2025-07-30
Payer: MEDICARE

## 2025-07-30 VITALS
DIASTOLIC BLOOD PRESSURE: 74 MMHG | WEIGHT: 271.81 LBS | SYSTOLIC BLOOD PRESSURE: 118 MMHG | BODY MASS INDEX: 36.02 KG/M2 | OXYGEN SATURATION: 97 % | HEIGHT: 73 IN | HEART RATE: 101 BPM | TEMPERATURE: 97 F

## 2025-07-30 DIAGNOSIS — F84.0 AUTISM: ICD-10-CM

## 2025-07-30 DIAGNOSIS — F33.0 MILD EPISODE OF RECURRENT MAJOR DEPRESSIVE DISORDER: Primary | ICD-10-CM

## 2025-07-30 DIAGNOSIS — F41.9 ANXIETY: ICD-10-CM

## 2025-07-30 DIAGNOSIS — G47.00 INSOMNIA, UNSPECIFIED TYPE: ICD-10-CM

## 2025-07-30 PROCEDURE — 3066F NEPHROPATHY DOC TX: CPT | Mod: ,,, | Performed by: NURSE PRACTITIONER

## 2025-07-30 PROCEDURE — 4010F ACE/ARB THERAPY RXD/TAKEN: CPT | Mod: ,,, | Performed by: NURSE PRACTITIONER

## 2025-07-30 PROCEDURE — 3078F DIAST BP <80 MM HG: CPT | Mod: ,,, | Performed by: NURSE PRACTITIONER

## 2025-07-30 PROCEDURE — 3008F BODY MASS INDEX DOCD: CPT | Mod: ,,, | Performed by: NURSE PRACTITIONER

## 2025-07-30 PROCEDURE — 3074F SYST BP LT 130 MM HG: CPT | Mod: ,,, | Performed by: NURSE PRACTITIONER

## 2025-07-30 PROCEDURE — 99214 OFFICE O/P EST MOD 30 MIN: CPT | Mod: ,,, | Performed by: NURSE PRACTITIONER

## 2025-07-30 PROCEDURE — 1159F MED LIST DOCD IN RCRD: CPT | Mod: ,,, | Performed by: NURSE PRACTITIONER

## 2025-07-30 PROCEDURE — 1160F RVW MEDS BY RX/DR IN RCRD: CPT | Mod: ,,, | Performed by: NURSE PRACTITIONER

## 2025-07-30 PROCEDURE — 3061F NEG MICROALBUMINURIA REV: CPT | Mod: ,,, | Performed by: NURSE PRACTITIONER

## 2025-07-30 RX ORDER — TRAZODONE HYDROCHLORIDE 150 MG/1
75 TABLET ORAL NIGHTLY
Qty: 15 TABLET | Refills: 1 | Status: SHIPPED | OUTPATIENT
Start: 2025-07-30

## 2025-07-30 RX ORDER — ARIPIPRAZOLE 10 MG/1
10 TABLET ORAL DAILY
Qty: 30 TABLET | Refills: 1 | Status: SHIPPED | OUTPATIENT
Start: 2025-07-30 | End: 2025-08-29

## 2025-07-30 RX ORDER — CITALOPRAM 20 MG/1
20 TABLET ORAL DAILY
Qty: 30 TABLET | Refills: 1 | Status: SHIPPED | OUTPATIENT
Start: 2025-07-30 | End: 2026-07-30

## 2025-07-30 RX ORDER — DONEPEZIL HYDROCHLORIDE 10 MG/1
10 TABLET, FILM COATED ORAL DAILY
Qty: 30 TABLET | Refills: 1 | Status: SHIPPED | OUTPATIENT
Start: 2025-07-30

## 2025-07-30 NOTE — PROGRESS NOTES
"PSYCHIATRIC FOLLOW-UP VISIT NOTE    Chief Complaint   Patient presents with    Medication Management     Medication management         History of Present Illness  29 y.o. year old Black or  male with hx of MDD, anxiety, insomnia, and autism seen today for follow-up appointment and medication management.  Patient reports he has been doing well with current medication regimen.  Depression is described as very minimal.  Some days he has some sadness with no cause but denies any serious symptoms of depression.  Still attending to his ADLs has not missed any shifts at work.  Anxiety is well-controlled per patient.  Denies any anhedonia.  Has been sleeping well with current medication regimen whenever he remembers to take his trazodone.  Overall he is without any acute complaints today and denies any side effects from current medication regimen. Patient denies SI/HI. Denies hallucinations and does not appear to be responding to internal stimuli or be internally preoccupied. No manic symptoms noted. Tolerating SGA therapy without serious side effects. No NMS s/s. No EPS or TD symptoms noted. AIMS=0.        Objective:     Vitals:  Vitals:    07/30/25 0735   BP: 118/74   BP Location: Right arm   Patient Position: Sitting   Pulse: 101   Temp: 97.3 °F (36.3 °C)   TempSrc: Temporal   SpO2: 97%   Weight: 123.3 kg (271 lb 13.2 oz)   Height: 6' 0.99" (1.854 m)       Wt Readings from Last 3 Encounters:   07/30/25 0735 123.3 kg (271 lb 13.2 oz)   06/17/25 0810 124.9 kg (275 lb 5.7 oz)   06/04/25 1348 124.1 kg (273 lb 9.6 oz)         Medication:  Current Medications[1]       Significant Labs: - none at this time    Significant Imaging: - none at this time    Physical Exam     See HPI    Review of Systems     Mental Status Exam:  Presentation:  - Appearance: 29 y.o. year old Black or  male, appears stated age, appears Casually dressed and Well groomed  - Motility: Erect when standing, Steady gait, No " "EPS or Tremors, No psychomotor agitation or retardation appreciated  - Behavior: calm, cooperative, good eye contact  Speech:  - Character/Organization: spontaneous, fluent, normal volume, normal rate, normal rhythm  Emotional State:  - Mood: "happy"   - Affect: congruent and appropriate  Thought:  - Process: logical, linear, organized , goal-directed  - Preoccupations: no ruminations, rituals, or phobias appreciated  - Delusions: no persecutory, paranoid, or grandiose delusions appreciated  - Perception: denies AVH, not actively responding to internal stimuli  - SI/HI: denies/denies  Sensorium & Intellect:  - Sensorium: AAOx4  - Memory: intact to recent and remote events  - Attention/Concentration: good/good  - Insight/Judgement: good/good    Gait: normal swing and stance  MSK:no rigidity appreciated    All other systems without acute issues unless noted in HPI      Assessment/Plan      ICD-10-CM ICD-9-CM    1. Mild episode of recurrent major depressive disorder  F33.0 296.31 ARIPiprazole (ABILIFY) 10 MG Tab      citalopram (CELEXA) 20 MG tablet      2. Anxiety  F41.9 300.00       3. Insomnia, unspecified type  G47.00 780.52 traZODone (DESYREL) 150 MG tablet      4. Autism  F84.0 299.00 donepeziL (ARICEPT) 10 MG tablet         Continue current medications without change    Potential side effects and risks vs benefits of current treatment plan reviewed with patient. Applicable black box warnings reviewed. Encouraged patient not to alter dosages or abruptly discontinue medications without contacting prescriber first, due to risk of worsening symptoms and decompensation of mental status. Warned of risks associated with herbal remedies and supplements while taking psychotropic medications and of the need to consult prescriber prior to adding any of these to current regimen. Patient should abstain from abuse of alcohol, prescription medications, and illicit drugs. Reviewed when to contact clinic and/or seek emergent " "care, such as but not limited to, onset/worsening SI/HI, hallucinations, delusions, manic symptoms. Pt verbalized understanding and agreement of these warnings/recommendations and verbally consented to treatment plan.    Portions of this note may have been created with voice recognition software. Occasional "wrong-word" or "sound-a-like" substitutions may have occurred due to the inherent limitations of voice recognition software. Please, read the note carefully and recognize, using context, where substitutions have occurred.      Follow up in about 2 months (around 9/30/2025) for Medication Management.        [facility]wan [facility], [facility]         [1]   Current Outpatient Medications:     albuterol (PROVENTIL) 2.5 mg /3 mL (0.083 %) nebulizer solution, TAKE 3 MLS BY NEBULIZATION EVERY 6 (SIX) HOURS AS NEEDED FOR WHEEZING. RESCUE, Disp: 300 mL, Rfl: 11    albuterol (VENTOLIN HFA) 90 mcg/actuation inhaler, Inhale 2 puffs into the lungs every 6 (six) hours as needed for Wheezing or Shortness of Breath. Rescue, Disp: 18 g, Rfl: 11    atorvastatin (LIPITOR) 10 MG tablet, Take 10 mg by mouth once daily., Disp: , Rfl:     dulaglutide (TRULICITY) 4.5 mg/0.5 mL pen injector, Inject 4.5 mg into the skin every 7 days., Disp: , Rfl:     FARXIGA 10 mg tablet, Take 1 tablet (10 mg total) by mouth Daily., Disp: 90 tablet, Rfl: 3    ibuprofen (ADVIL,MOTRIN) 800 MG tablet, TAKE 1 TABLET BY MOUTH EVERY 6 HOURS AS NEEDED FOR PAIN, Disp: 30 tablet, Rfl: 11    icosapent ethyL (VASCEPA) 1 gram Cap, Take 2 capsules (2 g total) by mouth 2 (two) times daily., Disp: 360 capsule, Rfl: 3    lisinopriL (PRINIVIL,ZESTRIL) 20 MG tablet, Take 1 tablet (20 mg total) by mouth once daily., Disp: 90 tablet, Rfl: 3    metFORMIN (GLUCOPHAGE) 1000 MG tablet, Take 1 tablet (1,000 mg total) by mouth daily with breakfast., Disp: 90 tablet, Rfl: 3    montelukast (SINGULAIR) 10 mg tablet, Take 10 mg by mouth once daily., Disp: , Rfl:     pioglitazone (ACTOS) " 30 MG tablet, Take 30 mg by mouth once daily., Disp: , Rfl:     TRESIBA FLEXTOUCH U-200 200 unit/mL (3 mL) insulin pen, SMARTSI Unit(s) SUB-Q Daily, Disp: , Rfl:     ARIPiprazole (ABILIFY) 10 MG Tab, Take 1 tablet (10 mg total) by mouth once daily., Disp: 30 tablet, Rfl: 1    citalopram (CELEXA) 20 MG tablet, Take 1 tablet (20 mg total) by mouth once daily., Disp: 30 tablet, Rfl: 1    donepeziL (ARICEPT) 10 MG tablet, Take 1 tablet (10 mg total) by mouth once daily., Disp: 30 tablet, Rfl: 1    traZODone (DESYREL) 150 MG tablet, Take 0.5 tablets (75 mg total) by mouth every evening., Disp: 15 tablet, Rfl: 1